# Patient Record
Sex: FEMALE | Race: WHITE | Employment: OTHER | RURAL
[De-identification: names, ages, dates, MRNs, and addresses within clinical notes are randomized per-mention and may not be internally consistent; named-entity substitution may affect disease eponyms.]

---

## 2017-02-02 ENCOUNTER — NURSE ONLY (OUTPATIENT)
Dept: FAMILY MEDICINE CLINIC | Age: 72
End: 2017-02-02
Payer: MEDICARE

## 2017-02-02 DIAGNOSIS — N28.9 ABNORMAL RENAL FUNCTION: Primary | ICD-10-CM

## 2017-02-02 DIAGNOSIS — E55.9 VITAMIN D DEFICIENCY: ICD-10-CM

## 2017-02-02 LAB
ANION GAP SERPL CALCULATED.3IONS-SCNC: 16 MMOL/L (ref 7–19)
BUN BLDV-MCNC: 26 MG/DL (ref 8–23)
CALCIUM SERPL-MCNC: 9.4 MG/DL (ref 8.8–10.2)
CHLORIDE BLD-SCNC: 100 MMOL/L (ref 98–111)
CO2: 25 MMOL/L (ref 22–29)
CREAT SERPL-MCNC: 1 MG/DL (ref 0.5–0.9)
GFR NON-AFRICAN AMERICAN: 54
GLUCOSE BLD-MCNC: 90 MG/DL (ref 74–109)
POTASSIUM SERPL-SCNC: 5 MMOL/L (ref 3.5–5)
SODIUM BLD-SCNC: 141 MMOL/L (ref 136–145)
VITAMIN D 25-HYDROXY: 39.8 NG/ML

## 2017-02-02 PROCEDURE — 36415 COLL VENOUS BLD VENIPUNCTURE: CPT | Performed by: NURSE PRACTITIONER

## 2017-02-20 ENCOUNTER — TELEPHONE (OUTPATIENT)
Dept: FAMILY MEDICINE CLINIC | Age: 72
End: 2017-02-20

## 2017-02-20 RX ORDER — GUAIFENESIN 600 MG/1
600 TABLET, EXTENDED RELEASE ORAL 2 TIMES DAILY
Qty: 60 TABLET | Refills: 1 | Status: SHIPPED | OUTPATIENT
Start: 2017-02-20 | End: 2019-04-29

## 2017-02-20 RX ORDER — METHYLPREDNISOLONE 4 MG/1
TABLET ORAL
Qty: 1 KIT | Refills: 0 | Status: SHIPPED | OUTPATIENT
Start: 2017-02-20 | End: 2017-02-26

## 2017-03-13 RX ORDER — PRAVASTATIN SODIUM 20 MG
TABLET ORAL
Qty: 30 TABLET | Refills: 5 | Status: SHIPPED | OUTPATIENT
Start: 2017-03-13 | End: 2017-09-08 | Stop reason: SDUPTHER

## 2017-03-13 RX ORDER — INDAPAMIDE 2.5 MG/1
TABLET, FILM COATED ORAL
Qty: 30 TABLET | Refills: 5 | Status: SHIPPED | OUTPATIENT
Start: 2017-03-13 | End: 2017-09-08 | Stop reason: SDUPTHER

## 2017-03-13 RX ORDER — PROPRANOLOL HYDROCHLORIDE 120 MG/1
CAPSULE, EXTENDED RELEASE ORAL
Qty: 30 CAPSULE | Refills: 5 | Status: SHIPPED | OUTPATIENT
Start: 2017-03-13 | End: 2017-09-08 | Stop reason: SDUPTHER

## 2017-03-13 RX ORDER — CANDESARTAN 32 MG/1
TABLET ORAL
Qty: 30 TABLET | Refills: 5 | Status: SHIPPED | OUTPATIENT
Start: 2017-03-13 | End: 2017-09-08 | Stop reason: SDUPTHER

## 2017-03-13 RX ORDER — ESOMEPRAZOLE MAGNESIUM 40 MG/1
CAPSULE, DELAYED RELEASE ORAL
Qty: 30 CAPSULE | Refills: 5 | Status: SHIPPED | OUTPATIENT
Start: 2017-03-13 | End: 2017-09-08 | Stop reason: SDUPTHER

## 2017-07-06 ENCOUNTER — NURSE ONLY (OUTPATIENT)
Dept: FAMILY MEDICINE CLINIC | Age: 72
End: 2017-07-06

## 2017-07-07 ENCOUNTER — TELEPHONE (OUTPATIENT)
Dept: FAMILY MEDICINE CLINIC | Age: 72
End: 2017-07-07

## 2017-07-24 ENCOUNTER — OFFICE VISIT (OUTPATIENT)
Dept: FAMILY MEDICINE CLINIC | Age: 72
End: 2017-07-24
Payer: MEDICARE

## 2017-07-24 VITALS
DIASTOLIC BLOOD PRESSURE: 70 MMHG | SYSTOLIC BLOOD PRESSURE: 110 MMHG | WEIGHT: 210 LBS | BODY MASS INDEX: 35.85 KG/M2 | HEIGHT: 64 IN

## 2017-07-24 DIAGNOSIS — M25.511 CHRONIC RIGHT SHOULDER PAIN: ICD-10-CM

## 2017-07-24 DIAGNOSIS — I10 ESSENTIAL HYPERTENSION: Primary | ICD-10-CM

## 2017-07-24 DIAGNOSIS — K52.9 CHRONIC DIARRHEA: ICD-10-CM

## 2017-07-24 DIAGNOSIS — G89.29 CHRONIC RIGHT SHOULDER PAIN: ICD-10-CM

## 2017-07-24 DIAGNOSIS — E03.9 HYPOTHYROIDISM, UNSPECIFIED TYPE: ICD-10-CM

## 2017-07-24 PROCEDURE — 1090F PRES/ABSN URINE INCON ASSESS: CPT | Performed by: NURSE PRACTITIONER

## 2017-07-24 PROCEDURE — 1123F ACP DISCUSS/DSCN MKR DOCD: CPT | Performed by: NURSE PRACTITIONER

## 2017-07-24 PROCEDURE — 1036F TOBACCO NON-USER: CPT | Performed by: NURSE PRACTITIONER

## 2017-07-24 PROCEDURE — 3017F COLORECTAL CA SCREEN DOC REV: CPT | Performed by: NURSE PRACTITIONER

## 2017-07-24 PROCEDURE — 99214 OFFICE O/P EST MOD 30 MIN: CPT | Performed by: NURSE PRACTITIONER

## 2017-07-24 PROCEDURE — G8399 PT W/DXA RESULTS DOCUMENT: HCPCS | Performed by: NURSE PRACTITIONER

## 2017-07-24 PROCEDURE — G8419 CALC BMI OUT NRM PARAM NOF/U: HCPCS | Performed by: NURSE PRACTITIONER

## 2017-07-24 PROCEDURE — G8427 DOCREV CUR MEDS BY ELIG CLIN: HCPCS | Performed by: NURSE PRACTITIONER

## 2017-07-24 PROCEDURE — 3014F SCREEN MAMMO DOC REV: CPT | Performed by: NURSE PRACTITIONER

## 2017-07-24 PROCEDURE — 4040F PNEUMOC VAC/ADMIN/RCVD: CPT | Performed by: NURSE PRACTITIONER

## 2017-07-24 RX ORDER — DICYCLOMINE HYDROCHLORIDE 10 MG/1
10 CAPSULE ORAL 4 TIMES DAILY PRN
Qty: 120 CAPSULE | Refills: 3 | Status: SHIPPED | OUTPATIENT
Start: 2017-07-24 | End: 2018-09-24 | Stop reason: SDUPTHER

## 2017-07-24 RX ORDER — HYDROCODONE BITARTRATE AND ACETAMINOPHEN 7.5; 325 MG/1; MG/1
1 TABLET ORAL EVERY 8 HOURS PRN
Qty: 45 TABLET | Refills: 0 | Status: SHIPPED | OUTPATIENT
Start: 2017-07-24 | End: 2018-07-16 | Stop reason: ALTCHOICE

## 2017-07-24 RX ORDER — DIPHENOXYLATE HYDROCHLORIDE AND ATROPINE SULFATE 2.5; .025 MG/1; MG/1
1 TABLET ORAL 4 TIMES DAILY PRN
Qty: 30 TABLET | Refills: 2 | Status: SHIPPED | OUTPATIENT
Start: 2017-07-24 | End: 2017-10-10 | Stop reason: SDUPTHER

## 2017-07-24 ASSESSMENT — ENCOUNTER SYMPTOMS
HEMOPTYSIS: 0
VOMITING: 0
NAUSEA: 0
ABDOMINAL PAIN: 0
COUGH: 0
HEARTBURN: 0
RESPIRATORY NEGATIVE: 1
BACK PAIN: 0
ORTHOPNEA: 0
SPUTUM PRODUCTION: 0
SHORTNESS OF BREATH: 0
DIARRHEA: 1
EYES NEGATIVE: 1

## 2017-07-24 ASSESSMENT — PATIENT HEALTH QUESTIONNAIRE - PHQ9
1. LITTLE INTEREST OR PLEASURE IN DOING THINGS: 0
SUM OF ALL RESPONSES TO PHQ QUESTIONS 1-9: 0
2. FEELING DOWN, DEPRESSED OR HOPELESS: 0
SUM OF ALL RESPONSES TO PHQ9 QUESTIONS 1 & 2: 0

## 2017-09-08 RX ORDER — ESOMEPRAZOLE MAGNESIUM 40 MG/1
CAPSULE, DELAYED RELEASE ORAL
Qty: 30 CAPSULE | Refills: 5 | Status: SHIPPED | OUTPATIENT
Start: 2017-09-08 | End: 2017-09-11

## 2017-09-08 RX ORDER — CANDESARTAN 32 MG/1
TABLET ORAL
Qty: 30 TABLET | Refills: 5 | Status: SHIPPED | OUTPATIENT
Start: 2017-09-08 | End: 2017-09-11

## 2017-09-08 RX ORDER — PROPRANOLOL HYDROCHLORIDE 120 MG/1
CAPSULE, EXTENDED RELEASE ORAL
Qty: 30 CAPSULE | Refills: 5 | Status: SHIPPED | OUTPATIENT
Start: 2017-09-08 | End: 2017-09-11

## 2017-09-08 RX ORDER — INDAPAMIDE 2.5 MG/1
TABLET, FILM COATED ORAL
Qty: 30 TABLET | Refills: 5 | Status: SHIPPED | OUTPATIENT
Start: 2017-09-08 | End: 2017-09-11

## 2017-09-08 RX ORDER — PRAVASTATIN SODIUM 20 MG
TABLET ORAL
Qty: 30 TABLET | Refills: 5 | Status: SHIPPED | OUTPATIENT
Start: 2017-09-08 | End: 2017-09-11

## 2017-09-11 RX ORDER — INDAPAMIDE 2.5 MG/1
TABLET, FILM COATED ORAL
Qty: 30 TABLET | Refills: 5 | Status: SHIPPED | OUTPATIENT
Start: 2017-09-11 | End: 2018-10-16 | Stop reason: SDUPTHER

## 2017-09-11 RX ORDER — ESOMEPRAZOLE MAGNESIUM 40 MG/1
CAPSULE, DELAYED RELEASE ORAL
Qty: 30 CAPSULE | Refills: 5 | Status: SHIPPED | OUTPATIENT
Start: 2017-09-11 | End: 2018-10-16 | Stop reason: SDUPTHER

## 2017-09-11 RX ORDER — PRAVASTATIN SODIUM 20 MG
TABLET ORAL
Qty: 30 TABLET | Refills: 5 | Status: SHIPPED | OUTPATIENT
Start: 2017-09-11 | End: 2018-10-04

## 2017-09-11 RX ORDER — PROPRANOLOL HYDROCHLORIDE 120 MG/1
CAPSULE, EXTENDED RELEASE ORAL
Qty: 30 CAPSULE | Refills: 5 | Status: SHIPPED | OUTPATIENT
Start: 2017-09-11 | End: 2018-10-16 | Stop reason: SDUPTHER

## 2017-09-11 RX ORDER — CANDESARTAN 32 MG/1
TABLET ORAL
Qty: 30 TABLET | Refills: 5 | Status: SHIPPED | OUTPATIENT
Start: 2017-09-11 | End: 2018-10-16 | Stop reason: SDUPTHER

## 2017-09-28 ENCOUNTER — TELEPHONE (OUTPATIENT)
Dept: FAMILY MEDICINE CLINIC | Age: 72
End: 2017-09-28

## 2017-10-10 RX ORDER — DIPHENOXYLATE HYDROCHLORIDE AND ATROPINE SULFATE 2.5; .025 MG/1; MG/1
1 TABLET ORAL 4 TIMES DAILY PRN
Qty: 30 TABLET | Refills: 2 | Status: SHIPPED | OUTPATIENT
Start: 2017-10-10 | End: 2019-02-06 | Stop reason: SDUPTHER

## 2018-07-03 DIAGNOSIS — K21.9 GASTROESOPHAGEAL REFLUX DISEASE, ESOPHAGITIS PRESENCE NOT SPECIFIED: ICD-10-CM

## 2018-07-03 DIAGNOSIS — M19.90 OSTEOARTHRITIS, UNSPECIFIED OSTEOARTHRITIS TYPE, UNSPECIFIED SITE: ICD-10-CM

## 2018-07-03 DIAGNOSIS — F41.0 PANIC ATTACK: ICD-10-CM

## 2018-07-03 DIAGNOSIS — E78.00 HYPERCHOLESTEROLEMIA: ICD-10-CM

## 2018-07-03 DIAGNOSIS — E78.1 HYPERTRIGLYCERIDEMIA: ICD-10-CM

## 2018-07-03 RX ORDER — ACETAMINOPHEN 160 MG
TABLET,DISINTEGRATING ORAL
COMMUNITY
End: 2022-05-12

## 2018-07-03 RX ORDER — FENOFIBRATE 160 MG/1
160 TABLET ORAL DAILY
COMMUNITY
End: 2018-07-16 | Stop reason: ALTCHOICE

## 2018-07-03 RX ORDER — LOSARTAN POTASSIUM 100 MG/1
100 TABLET ORAL DAILY
COMMUNITY
End: 2018-07-16 | Stop reason: ALTCHOICE

## 2018-07-15 PROBLEM — Z98.890 S/P CARDIAC CATH: Status: ACTIVE | Noted: 2018-07-15

## 2018-07-16 ENCOUNTER — OFFICE VISIT (OUTPATIENT)
Dept: CARDIOLOGY | Age: 73
End: 2018-07-16
Payer: MEDICARE

## 2018-07-16 VITALS
SYSTOLIC BLOOD PRESSURE: 140 MMHG | WEIGHT: 212 LBS | HEART RATE: 55 BPM | BODY MASS INDEX: 36.19 KG/M2 | DIASTOLIC BLOOD PRESSURE: 70 MMHG | HEIGHT: 64 IN

## 2018-07-16 DIAGNOSIS — I10 SYSTEMIC PRIMARY ARTERIAL HYPERTENSION: Primary | ICD-10-CM

## 2018-07-16 DIAGNOSIS — E04.1 THYROID NODULE: ICD-10-CM

## 2018-07-16 DIAGNOSIS — Z98.890 S/P CARDIAC CATH: ICD-10-CM

## 2018-07-16 DIAGNOSIS — E66.09 CLASS 2 OBESITY DUE TO EXCESS CALORIES WITHOUT SERIOUS COMORBIDITY WITH BODY MASS INDEX (BMI) OF 36.0 TO 36.9 IN ADULT: ICD-10-CM

## 2018-07-16 DIAGNOSIS — E78.00 HYPERCHOLESTEROLEMIA: ICD-10-CM

## 2018-07-16 PROBLEM — E66.812 CLASS 2 OBESITY DUE TO EXCESS CALORIES WITHOUT SERIOUS COMORBIDITY IN ADULT: Status: ACTIVE | Noted: 2018-07-16

## 2018-07-16 PROCEDURE — 3017F COLORECTAL CA SCREEN DOC REV: CPT | Performed by: INTERNAL MEDICINE

## 2018-07-16 PROCEDURE — G8427 DOCREV CUR MEDS BY ELIG CLIN: HCPCS | Performed by: INTERNAL MEDICINE

## 2018-07-16 PROCEDURE — 1123F ACP DISCUSS/DSCN MKR DOCD: CPT | Performed by: INTERNAL MEDICINE

## 2018-07-16 PROCEDURE — 4040F PNEUMOC VAC/ADMIN/RCVD: CPT | Performed by: INTERNAL MEDICINE

## 2018-07-16 PROCEDURE — 1101F PT FALLS ASSESS-DOCD LE1/YR: CPT | Performed by: INTERNAL MEDICINE

## 2018-07-16 PROCEDURE — 1036F TOBACCO NON-USER: CPT | Performed by: INTERNAL MEDICINE

## 2018-07-16 PROCEDURE — G8417 CALC BMI ABV UP PARAM F/U: HCPCS | Performed by: INTERNAL MEDICINE

## 2018-07-16 PROCEDURE — 1090F PRES/ABSN URINE INCON ASSESS: CPT | Performed by: INTERNAL MEDICINE

## 2018-07-16 PROCEDURE — 93000 ELECTROCARDIOGRAM COMPLETE: CPT | Performed by: INTERNAL MEDICINE

## 2018-07-16 PROCEDURE — G8399 PT W/DXA RESULTS DOCUMENT: HCPCS | Performed by: INTERNAL MEDICINE

## 2018-07-16 PROCEDURE — 99202 OFFICE O/P NEW SF 15 MIN: CPT | Performed by: INTERNAL MEDICINE

## 2018-07-16 ASSESSMENT — ENCOUNTER SYMPTOMS
BLOOD IN STOOL: 0
STRIDOR: 0
WHEEZING: 0
SHORTNESS OF BREATH: 0
CHEST TIGHTNESS: 0
BACK PAIN: 0
APNEA: 0
CHOKING: 0
NAUSEA: 0
ABDOMINAL DISTENTION: 0

## 2018-07-16 NOTE — PROGRESS NOTES
normally in 120s at home and at primary care's office  ++ FH of CAD (father and two siblings at early age)     Gastrointestinal: Negative for abdominal distention, blood in stool and nausea. GERD   Bout with gastritis earlier this year   Endocrine: Negative for cold intolerance, heat intolerance, polydipsia and polyuria. Combined hyperlipidemia - recent values reportedly good  R lobe thyroid nodule presently under investigation  Hypothyroid - on replacement therapy   Genitourinary: Negative for decreased urine volume and difficulty urinating. Musculoskeletal: Negative for arthralgias, back pain, gait problem and myalgias. Broke left foot this spring without recognized trauma   Skin: Negative for pallor and rash. Allergic/Immunologic: Negative for environmental allergies, food allergies and immunocompromised state. Neurological: Negative for dizziness, tremors, seizures, syncope, speech difficulty, weakness, light-headedness, numbness and headaches. Psychiatric/Behavioral: Negative for agitation, confusion, dysphoric mood and sleep disturbance. The patient is nervous/anxious (concerned about thyroid nodule). BP (!) 140/70   Pulse 55   Ht 5' 4\" (1.626 m)   Wt 212 lb (96.2 kg)   BMI 36.39 kg/m²   Physical Exam   Constitutional: She is oriented to person, place, and time. No distress. Moderately obese   HENT:   Head: Normocephalic. Alopecia   Eyes: Conjunctivae and EOM are normal. Pupils are equal, round, and reactive to light. Neck: No JVD present. Carotid bruit is not present. No thyromegaly present. Cardiovascular: Normal rate, regular rhythm, normal heart sounds, intact distal pulses and normal pulses. PMI is not displaced. Exam reveals no gallop and no friction rub. No murmur heard. Pulmonary/Chest: No respiratory distress. She has no wheezes. She has no rales. She exhibits no tenderness. Brief inspiratory coarse breath sound   Abdominal: Soft.  Bowel sounds

## 2018-07-16 NOTE — LETTER
Dear Keyanna August, APRN - CNP,    Thank you for allowing me to participate in the care of Ms. Alexis Schneider. She presents today at the [address] [place] [address] in the [facility]. 66 y/o lady with +FH of premature CAD, hypertension and combined hyperlipidemia. Normal cath in 2014. Sedentary for the past few months in the wake of fracturing her left foot. No ischemic symptoms. BP normally in the 120s and lipids reportedly good 6 weeks ago. Has right thyoid lobe nodule under investigation - anxious about it. Patient Active Problem List   Diagnosis    Painful intercourse    Systemic primary arterial hypertension    Combined hyperlipidemia    Chronic diarrhea    History of colon resection    Osteoarthritis    Hypercholesterolemia    Hypertriglyceridemia    GERD (gastroesophageal reflux disease)    Panic attack    S/P cardiac cath    Thyroid nodule    Class 2 obesity due to excess calories without serious comorbidity in adult         Advised her that her BP target was less than 130. If continues to run > 130 suggest consider sleep study and perhaps Clonidine 0.1 mg. qHs.       Sincerely yours,    Colleen Dorsey MD  [address] [facility]

## 2018-08-01 ENCOUNTER — OFFICE VISIT (OUTPATIENT)
Dept: PRIMARY CARE CLINIC | Age: 73
End: 2018-08-01
Payer: MEDICARE

## 2018-08-01 VITALS
RESPIRATION RATE: 20 BRPM | WEIGHT: 211 LBS | TEMPERATURE: 97.6 F | HEART RATE: 65 BPM | BODY MASS INDEX: 37.39 KG/M2 | DIASTOLIC BLOOD PRESSURE: 84 MMHG | SYSTOLIC BLOOD PRESSURE: 138 MMHG | HEIGHT: 63 IN | OXYGEN SATURATION: 97 %

## 2018-08-01 DIAGNOSIS — E78.1 HYPERTRIGLYCERIDEMIA: ICD-10-CM

## 2018-08-01 DIAGNOSIS — I10 ESSENTIAL HYPERTENSION: ICD-10-CM

## 2018-08-01 DIAGNOSIS — E78.00 HYPERCHOLESTEROLEMIA: ICD-10-CM

## 2018-08-01 DIAGNOSIS — M54.2 NECK PAIN: Primary | ICD-10-CM

## 2018-08-01 LAB
ALBUMIN SERPL-MCNC: 4.3 G/DL (ref 3.5–5.2)
ALP BLD-CCNC: 79 U/L (ref 35–104)
ALT SERPL-CCNC: 8 U/L (ref 5–33)
ANION GAP SERPL CALCULATED.3IONS-SCNC: 17 MMOL/L (ref 7–19)
AST SERPL-CCNC: 22 U/L (ref 5–32)
BASOPHILS ABSOLUTE: 0.1 K/UL (ref 0–0.2)
BASOPHILS RELATIVE PERCENT: 0.7 % (ref 0–1)
BILIRUB SERPL-MCNC: 0.3 MG/DL (ref 0.2–1.2)
BUN BLDV-MCNC: 23 MG/DL (ref 8–23)
CALCIUM SERPL-MCNC: 10 MG/DL (ref 8.8–10.2)
CHLORIDE BLD-SCNC: 96 MMOL/L (ref 98–111)
CHOLESTEROL, TOTAL: 157 MG/DL (ref 160–199)
CO2: 23 MMOL/L (ref 22–29)
CREAT SERPL-MCNC: 1.1 MG/DL (ref 0.5–0.9)
EOSINOPHILS ABSOLUTE: 0.2 K/UL (ref 0–0.6)
EOSINOPHILS RELATIVE PERCENT: 1.8 % (ref 0–5)
GFR NON-AFRICAN AMERICAN: 49
GLUCOSE BLD-MCNC: 91 MG/DL (ref 74–109)
HCT VFR BLD CALC: 43.1 % (ref 37–47)
HDLC SERPL-MCNC: 57 MG/DL (ref 65–121)
HEMOGLOBIN: 13.2 G/DL (ref 12–16)
LDL CHOLESTEROL CALCULATED: 55 MG/DL
LYMPHOCYTES ABSOLUTE: 1.5 K/UL (ref 1.1–4.5)
LYMPHOCYTES RELATIVE PERCENT: 16.4 % (ref 20–40)
MCH RBC QN AUTO: 30.4 PG (ref 27–31)
MCHC RBC AUTO-ENTMCNC: 30.6 G/DL (ref 33–37)
MCV RBC AUTO: 99.3 FL (ref 81–99)
MONOCYTES ABSOLUTE: 0.7 K/UL (ref 0–0.9)
MONOCYTES RELATIVE PERCENT: 8.2 % (ref 0–10)
NEUTROPHILS ABSOLUTE: 6.4 K/UL (ref 1.5–7.5)
NEUTROPHILS RELATIVE PERCENT: 70.8 % (ref 50–65)
PDW BLD-RTO: 13.2 % (ref 11.5–14.5)
PLATELET # BLD: 314 K/UL (ref 130–400)
PMV BLD AUTO: 10.6 FL (ref 9.4–12.3)
POTASSIUM SERPL-SCNC: 5.8 MMOL/L (ref 3.5–5)
RBC # BLD: 4.34 M/UL (ref 4.2–5.4)
SODIUM BLD-SCNC: 136 MMOL/L (ref 136–145)
TOTAL PROTEIN: 7.5 G/DL (ref 6.6–8.7)
TRIGL SERPL-MCNC: 224 MG/DL (ref 0–149)
WBC # BLD: 9 K/UL (ref 4.8–10.8)

## 2018-08-01 PROCEDURE — 4040F PNEUMOC VAC/ADMIN/RCVD: CPT | Performed by: FAMILY MEDICINE

## 2018-08-01 PROCEDURE — 1101F PT FALLS ASSESS-DOCD LE1/YR: CPT | Performed by: FAMILY MEDICINE

## 2018-08-01 PROCEDURE — 99214 OFFICE O/P EST MOD 30 MIN: CPT | Performed by: FAMILY MEDICINE

## 2018-08-01 PROCEDURE — G8399 PT W/DXA RESULTS DOCUMENT: HCPCS | Performed by: FAMILY MEDICINE

## 2018-08-01 PROCEDURE — 3017F COLORECTAL CA SCREEN DOC REV: CPT | Performed by: FAMILY MEDICINE

## 2018-08-01 PROCEDURE — 1090F PRES/ABSN URINE INCON ASSESS: CPT | Performed by: FAMILY MEDICINE

## 2018-08-01 PROCEDURE — 1036F TOBACCO NON-USER: CPT | Performed by: FAMILY MEDICINE

## 2018-08-01 PROCEDURE — 1123F ACP DISCUSS/DSCN MKR DOCD: CPT | Performed by: FAMILY MEDICINE

## 2018-08-01 PROCEDURE — G8427 DOCREV CUR MEDS BY ELIG CLIN: HCPCS | Performed by: FAMILY MEDICINE

## 2018-08-01 PROCEDURE — G8417 CALC BMI ABV UP PARAM F/U: HCPCS | Performed by: FAMILY MEDICINE

## 2018-08-01 PROCEDURE — 36415 COLL VENOUS BLD VENIPUNCTURE: CPT | Performed by: FAMILY MEDICINE

## 2018-08-01 RX ORDER — CELECOXIB 200 MG/1
200 CAPSULE ORAL DAILY
Qty: 60 CAPSULE | Refills: 3 | Status: SHIPPED | OUTPATIENT
Start: 2018-08-01 | End: 2018-08-23

## 2018-08-01 ASSESSMENT — ENCOUNTER SYMPTOMS
VOMITING: 0
DIARRHEA: 0
COUGH: 0
COLOR CHANGE: 0
EYE DISCHARGE: 0
WHEEZING: 0
NAUSEA: 0
BACK PAIN: 0
ABDOMINAL PAIN: 0

## 2018-08-01 ASSESSMENT — PATIENT HEALTH QUESTIONNAIRE - PHQ9
SUM OF ALL RESPONSES TO PHQ QUESTIONS 1-9: 0
SUM OF ALL RESPONSES TO PHQ9 QUESTIONS 1 & 2: 0
1. LITTLE INTEREST OR PLEASURE IN DOING THINGS: 0
2. FEELING DOWN, DEPRESSED OR HOPELESS: 0

## 2018-08-01 NOTE — PROGRESS NOTES
Pt scheduled at [address] [place] [address] at [facility] in [place] on 08/07/18 at 11 am

## 2018-08-01 NOTE — PROGRESS NOTES
SUBJECTIVE:    Patient ID: Deepa Garcia is a 67 y.o. female. HPI:     Patient presents today to establish care. She has been seeing [facility] in [place]. She is here today over a couple of concerns. She states that she had a thyroid ultrasound done and it showed a 2.6 cm cyst on the right lobe. She states that she also had some smaller nodules on the left lobe. She states that she was subsequently seen by an endocrinologist at [place]. She states that she is on thyroid medication. She states that they are not going to do anything with the nodules at this time other than monitor them. She has a follow-up appointment with endocrinologist in 6 months. She states that she is concerned because she can feel the enlargement in her thyroid gland. She states that she also has a history of high blood pressure. She is on blood pressure medication and it tends to stay fairly well controlled with the medications that she is taking. She states that she is not routinely monitoring her blood pressure. She does complain today of some arthritis. She has joint swelling and arthritis in her hands as well as some pain in her neck. She states that it tends to flareup and has been worse over the last couple of months. She states that she has pain at the base of her skull and some tightness. She states that she's been trying to take some ibuprofen but when she takes this it upsets her stomach. She states that she is able to tell a difference with ibuprofen but she cannot tolerate it due to her stomach.     Past Medical History:   Diagnosis Date    Carpal tunnel syndrome, right     HIGH CHOLESTEROL     Hypertension     Hypothyroidism      Current Outpatient Prescriptions on File Prior to Visit   Medication Sig Dispense Refill    Cholecalciferol (VITAMIN D3) 2000 units CAPS Take by mouth      diphenoxylate-atropine (DIPHENATOL) 2.5-0.025 MG per tablet Take 1 tablet by mouth 4 times daily as needed for Diarrhea 30 tablet 2    levothyroxine (SYNTHROID) 125 MCG tablet TAKE ONE TABLET BY MOUTH EVERY DAY (Patient taking differently: TAKE ONE TABLET BY MOUTH EVERY DAY  Name Brand Only) 30 tablet 0    indapamide (LOZOL) 2.5 MG tablet TAKE ONE TABLET BY MOUTH EVERY MORNING 30 tablet 5    propranolol (INDERAL LA) 120 MG extended release capsule TAKE ONE CAPSULE BY MOUTH EVERY DAY 30 capsule 5    pravastatin (PRAVACHOL) 20 MG tablet TAKE ONE TABLET BY MOUTH AT BEDTIME 30 tablet 5    candesartan (ATACAND) 32 MG tablet TAKE ONE TABLET BY MOUTH EVERY DAY 30 tablet 5    esomeprazole (NEXIUM) 40 MG delayed release capsule TAKE ONE CAPSULE BY MOUTH EVERY MORNING BEFORE BREAKFAST 30 capsule 5    dicyclomine (BENTYL) 10 MG capsule Take 1 capsule by mouth 4 times daily as needed (irrirable bowel symptoms) 120 capsule 3    guaiFENesin (MUCINEX) 600 MG extended release tablet Take 1 tablet by mouth 2 times daily (Patient taking differently: Take 600 mg by mouth 2 times daily as needed ) 60 tablet 1    aspirin 81 MG tablet Take 81 mg by mouth daily       No current facility-administered medications on file prior to visit.       Allergies   Allergen Reactions    Codeine      Past Surgical History:   Procedure Laterality Date    BREAST BIOPSY Right     BREAST LUMPECTOMY  1980s    2 times    CARDIAC CATHETERIZATION  2007    CARDIAC CATHETERIZATION  2014    CHOLECYSTECTOMY  2012    COLECTOMY      2012    COLONOSCOPY  2015    normal, due 10 years    HAND SURGERY      reconstruction, right hand    HYSTERECTOMY      total    TUBAL LIGATION      UPPER GASTROINTESTINAL ENDOSCOPY  2014    normal     Family History   Problem Relation Age of Onset    Heart Disease Mother     Heart Disease Father     Heart Disease Sister     Heart Disease Brother     Heart Disease Brother      Social History     Social History    Marital status:      Spouse name: N/A    Number of children: N/A    Years of education: Wt 211 lb (95.7 kg)   SpO2 97%   BMI 37.38 kg/m²      ASSESSMENT:    Yulissa Miranda was seen today for establish care, thyroid problem, hypertension and hyperlipidemia. Diagnoses and all orders for this visit:    Neck pain  -     External Referral To Physical Therapy  -     CBC Auto Differential  -     Comprehensive Metabolic Panel  -     Lipid Panel    Hypertriglyceridemia  -     CBC Auto Differential  -     Comprehensive Metabolic Panel  -     Lipid Panel    Hypercholesterolemia  -     CBC Auto Differential  -     Comprehensive Metabolic Panel  -     Lipid Panel    Essential hypertension  -     CBC Auto Differential  -     Comprehensive Metabolic Panel  -     Lipid Panel    Other orders  -     celecoxib (CELEBREX) 200 MG capsule; Take 1 capsule by mouth daily        PLAN:    We will start Celebrex for her arthritis and neck pain. I'm going to check labs as noted above. We'll notify her of the results. Encouraged her to continue to follow-up with endocrinology at [facility] regarding her thyroid. Follow-up with us in 6 months for checkup unless needed sooner. EMR Dragon/transcription disclaimer:  Much of this encounter note is electronic transcription/translation of spoken language to printed texts. The electronic translation of spoken language may be erroneous, or at times, nonsensical words or phrases may be inadvertently transcribed.   Although I have reviewed the note for such errors, some may still exist.

## 2018-08-07 ENCOUNTER — TRANSCRIBE ORDERS (OUTPATIENT)
Dept: PHYSICAL THERAPY | Facility: HOSPITAL | Age: 73
End: 2018-08-07

## 2018-08-07 ENCOUNTER — HOSPITAL ENCOUNTER (OUTPATIENT)
Dept: PHYSICAL THERAPY | Facility: HOSPITAL | Age: 73
Setting detail: THERAPIES SERIES
Discharge: HOME | End: 2018-08-07

## 2018-08-07 DIAGNOSIS — M54.2 NECK PAIN: Primary | ICD-10-CM

## 2018-08-07 PROCEDURE — G8981 BODY POS CURRENT STATUS: HCPCS | Performed by: PHYSICAL THERAPIST

## 2018-08-07 PROCEDURE — 97161 PT EVAL LOW COMPLEX 20 MIN: CPT | Performed by: PHYSICAL THERAPIST

## 2018-08-07 PROCEDURE — 97110 THERAPEUTIC EXERCISES: CPT | Performed by: PHYSICAL THERAPIST

## 2018-08-07 PROCEDURE — G8982 BODY POS GOAL STATUS: HCPCS | Performed by: PHYSICAL THERAPIST

## 2018-08-07 NOTE — THERAPY EVALUATION
Outpatient Physical Therapy Ortho Initial Evaluation  [facility]     Patient Name: Vanessa Kamara  : 1945  MRN: [id number]  Today's Date: 2018      Visit Date: 2018     Subjective Improvement:   N/A    Attendance:   Approved:   Medicare guidelines MD follow up:   PRN       RC date:  18         There is no problem list on file for this patient.       Past Medical History:   Diagnosis Date   • Disease of thyroid gland    • GERD (gastroesophageal reflux disease)    • Hiatal hernia         Past Surgical History:   Procedure Laterality Date   • BREAST BIOPSY      Benign breast tumors   • CHOLECYSTECTOMY     • COLON RESECTION     • HAND SURGERY Right    • HYSTERECTOMY       Allergies   Allergen Reactions   • Codeine Unknown (See Comments)     Unknown     Medications:  - Candesartan  - Nexium  - Pravastatin  - Propranolol  - Synthroid  - Bentyl  - Vitamin D  - Low dose aspirin  - Indapamide      Visit Dx:     ICD-10-CM ICD-9-CM   1. Neck pain M54.2 723.1             Patient History     Row Name 18 1100             History    Chief Complaint Pain;Joint stiffness  -KG      Type of Pain Neck pain  -KG      Date Current Problem(s) Began --   6 months ago  -KG      Brief Description of Current Complaint Pt presents with c/o neck pain. States it stays localized to base of neck and mid neck. Reports no pain in BUE's or numbness & tingling. Pt states she cannot take a lot of anti-inflammatories due to some kidney issues. States the pain in her neck started on the R side but is on both sides. States stress makes it worse. Mostly feels stiff. Painful turning head L or R, seems worse on R. Pt states she has a R rotator cuff, not a good candidate for shoulder replacement. Doesn't have much pain with it unless trying to lift anything.  -KG      Patient/Caregiver Goals Relieve pain;Improve mobility  -KG      Hand Dominance right-handed  -KG      Occupation/sports/leisure  activities Pt is a retired ; pt enjoys sewing, knitting, quilting  -KG      Results of Clinical Tests No recent imaging completed   -KG         Pain     Pain Location Neck  -KG      Pain at Present 0  -KG      Pain at Best 4  -KG      Pain at Worst 8  -KG      Pain Frequency Intermittent  -KG      Pain Description Aching;Patient unable to describe  -KG      What Performance Factors Make the Current Problem(s) WORSE? Looking down for long periods of time, turning head L or R  -KG      What Performance Factors Make the Current Problem(s) BETTER? Using heating pad helps some  -KG      Pain Comments Painful with movement, prolonged positioning.  -KG      Is your sleep disturbed? Yes   Pt has trouble getting to sleep vs. sleeping  -KG      Difficulties at work? N/A  -KG      Difficulties with ADL's? Independent  -KG      Difficulties with recreational activities? Some increased pain looking down to sew  -KG         Fall Risk Assessment    Any falls in the past year: No  -KG        User Key  (r) = Recorded By, (t) = Taken By, (c) = Cosigned By    Initials Name Provider Type    KG Tran Vega, PT Physical Therapist                PT Ortho     Row Name 08/07/18 1100       Subjective Comments    Subjective Comments Refer to therapy pt history for details.  -KG       Precautions and Contraindications    Precautions/Limitations no known precautions/limitations  -KG       Subjective Pain    Able to rate subjective pain? yes  -KG    Pre-Treatment Pain Level 0   at rest  -KG    Subjective Pain Comment 4/10 with activity this date  -KG       Posture/Observations    Posture/Observations Comments No acute distress. Mild cervical gaurding noted. Fair/poor postural awareness noted. Forward head, rounded shoulder posture present.   -KG       Quarter Clearing    Quarter Clearing Upper Quarter Clearing  -KG       Sensory Screen for Light Touch- Upper Quarter Clearing    C4 (posterior shoulder) Bilateral:;Intact  -KG    C5  (lateral upper arm) Bilateral:;Intact  -KG    C6 (tip of thumb) Bilateral:;Intact  -KG    C7 (tip of 3rd finger) Bilateral:;Intact  -KG    C8 (tip of 5th finger) Bilateral:;Intact  -KG    T1 (medial lower arm) Bilateral:;Intact  -KG       Myotomal Screen- Upper Quarter Clearing    Shoulder flexion (C5) Bilateral:;WNL  -KG    Elbow flexion/wrist extension (C6) Bilateral:;WNL  -KG    Elbow extension/wrist flexion (C7) Bilateral:;WNL  -KG    Finger flexion/ (C8) Bilateral:;WNL  -KG    Finger abduction (T1) Bilateral:;WNL  -KG     Bilateral:;WNL  -KG       Cervical/Shoulder ROM Screen    Cervical flexion Normal  -KG    Cervical extension Impaired  -KG    Cervical lateral flexion Impaired  -KG    Cervical rotation Impaired  -KG    Cervical quadrant (Spurling's) Normal  -KG       Special Tests/Palpation    Special Tests/Palpation Cervical/Thoracic  -KG       Cervical Palpation    Cervical Palpation- Location? Suboccipital;Cervical facets;Levator scapula;Upper traps;Scalenes  -KG    Suboccipital Bilateral:;Tender;Guarded/taut  -KG    Cervical Facets Right:;Tender;Guarded/taut  -KG    Scalenes Bilateral:;Tender;Guarded/taut  -KG    Levator Scapula Bilateral:;Tender;Guarded/taut  -KG    Upper Traps Bilateral:;Tender;Guarded/taut  -KG       Cervical Accessory Motions    Cervical Accessory Motions Tested? Yes  -KG    Sideglide- C3 Right:;Hypomobile;Right pain  -KG    Sideglide- C4 Right:;Hypomobile;Right pain  -KG    Sideglide- C5 Right:;Hypomobile;Right pain  -KG    Sideglide- C6 Right:;Hypomobile;Right pain  -KG       General ROM    Head/Neck/Trunk Neck Extension;Neck Flexion;Neck Lt Lateral Flexion;Neck Rt Lateral Flexion;Neck Lt Rotation;Neck Rt Rotation  -KG    RT Upper Ext Rt Shoulder ABduction;Rt Shoulder Flexion  -KG    LT Upper Ext Lt Shoulder ABduction;Lt Shoulder Flexion  -KG       Head/Neck/Trunk    Neck Extension AROM 35 deg; pain  -KG    Neck Flexion AROM 55 deg; pain R base of skull  -KG    Neck Lt  Lateral Flexion AROM 35 deg  -KG    Neck Rt Lateral Flexion AROM 25 deg  -KG    Neck Lt Rotation AROM 60  -KG    Neck Rt Rotation AROM 48  -KG       Right Upper Ext    Rt Shoulder Abduction AROM 80 deg  -KG    Rt Shoulder Flexion AROM 115 deg  -KG    Rt Upper Extremity Comments  No neck pain; hx of R RTC tear  -KG       Left Upper Ext    Lt Shoulder Abduction AROM 155  -KG    Lt Shoulder Flexion AROM 158  -KG    Lt Upper Extremity Comments  No pain  -KG       MMT (Manual Muscle Testing)    Additional Documentation General Assessment (Manual Muscle Testing) (Group)  -KG       General Assessment (Manual Muscle Testing)    General Manual Muscle Testing (MMT) Assessment upper extremity strength deficits identified  -KG       Upper Extremity (Manual Muscle Testing)    Upper Extremity: Manual Muscle Testing (MMT) left shoulder strength deficit;right shoulder strength deficit  -KG       Left Shoulder (Manual Muscle Testing)    Comment, MMT: Left Shoulder Flex 4+/5, abd 4+/5  -KG       Right Shoulder (Manual Muscle Testing)    Comment, MMT: Right Shoulder Deferred due to lack of shoulder ROM  -KG       Sensation    Sensation WNL? WNL  -KG    Light Touch No apparent deficits  -KG       Flexibility    Flexibility Tested? Upper Extremity  -KG       Upper Extremity Flexibility    Suboccipitals Bilateral:;Moderately limited  -KG    Upper Trapezius Bilateral:;Mildly limited  -KG    Levator Scapula Bilateral:;Mildly limited  -KG      User Key  (r) = Recorded By, (t) = Taken By, (c) = Cosigned By    Initials Name Provider Type    Tran Ruiz, PT Physical Therapist                      Therapy Education  Education Details: POC education. Anatomy related to condition. HEP: supine chin tucks, scap retraction, cervical AROM, levator stretch towards L side.  Given: HEP, Symptoms/condition management, Pain management, Posture/body mechanics  Program: New  How Provided: Verbal, Demonstration, Written  Provided to: Patient  Level  of Understanding: Teach back education performed, Verbalized, Demonstrated           PT OP Goals     Row Name 08/07/18 1100          PT Short Term Goals    STG Date to Achieve 08/28/18  -KG     STG 1 Independent/compliant with HEP  -KG     STG 1 Progress New  -KG     STG 2 Tolerate 45 minute treatment session without increased pain  -KG     STG 2 Progress New  -KG     STG 3 Demonstrate cervical ext AROM to 45 deg without increased pain  -KG     STG 3 Progress New  -KG        Long Term Goals    LTG Date to Achieve 09/11/18  -KG     LTG 1 Subjectively report 60% improvement or greater  -KG     LTG 1 Progress New  -KG     LTG 2 Improve NDI score to 10% or less  -KG     LTG 2 Progress New  -KG     LTG 3 Demonstrate R cervical rotation to 60 deg or greater without increased pain  -KG     LTG 3 Progress New  -KG     LTG 4 Demonstrate independence in proper posture/body mechanics throughout treatment session without cuing  -KG     LTG 4 Progress New  -KG     LTG 5 Demonstrate R cervical lat flex AROM within 5 deg of L lat flex without increased pain  -KG     LTG 5 Progress New  -KG     LTG 6 Demonstrate decrease pain/TTP at bilateral sub-occipitals by 75%  -KG     LTG 6 Progress New  -KG        Time Calculation    PT Goal Re-Cert Due Date 08/28/18  -KG       User Key  (r) = Recorded By, (t) = Taken By, (c) = Cosigned By    Initials Name Provider Type    ARMOND Tran Vega, PT Physical Therapist                PT Assessment/Plan     Row Name 08/07/18 1100          PT Assessment    Functional Limitations Performance in leisure activities;Limitation in home management;Limitations in community activities  -KG     Impairments Impaired flexibility;Joint mobility;Muscle strength;Pain;Posture;Poor body mechanics;Range of motion  -KG     Assessment Comments Pt is a 72 y.o. female presenting with neck pain R>L. Pt more limited with cervical ext, R rotation/SB AROM. Hypomobility present R side glides. Pt very TTP/guarded at  bilateral sub-occipitals & R UT/levator > L. Overall demonstrate poor/fair postural awareness. Pt will benefit from skilled PT services to address these deficits for improvements in overall postural strengthening, cervical stabilization/mobility, and tolerance to daily activities.  -KG     Please refer to paper survey for additional self-reported information Yes  -KG     Rehab Potential Good  -KG     Patient/caregiver participated in establishment of treatment plan and goals Yes  -KG     Patient would benefit from skilled therapy intervention Yes  -KG        PT Plan    PT Frequency 2x/week  -KG     Predicted Duration of Therapy Intervention (Therapy Eval) 4 weeks  -KG     Planned CPT's? PT EVAL LOW COMPLEXITY: 19748;PT RE-EVAL: 48826;PT THER PROC EA 15 MIN: 59152;PT THER ACT EA 15 MIN: 06311;PT MANUAL THERAPY EA 15 MIN: 42158;PT NEUROMUSC RE-EDUCATION EA 15 MIN: 03096;PT SELF CARE/HOME MGMT/TRAIN EA 15: 23047;PT ELECTRICAL STIM UNATTEND: ;PT THER SUPP EA 15 MIN  -KG     Physical Therapy Interventions (Optional Details) dry needling;home exercise program;joint mobilization;manual therapy techniques;modalities;neuromuscular re-education;patient/family education;postural re-education;ROM (Range of Motion);strengthening;stretching  -KG     PT Plan Comments Focus on postural strengthening, cervical stabilization, isometrics, manual STM/MFR to sub-occipitals/parapspinals, gentle sideglides/mobilizaitons, cervical ROM.  -KG       User Key  (r) = Recorded By, (t) = Taken By, (c) = Cosigned By    Initials Name Provider Type    KG Tran Vega, PT Physical Therapist                Modalities     Row Name 08/07/18 1100             Moist Heat    MH Applied --   Pt deferred modalities  -KG        User Key  (r) = Recorded By, (t) = Taken By, (c) = Cosigned By    Initials Name Provider Type    KG Tran Vega, PT Physical Therapist              Exercises     Row Name 08/07/18 1100             Precautions    Existing  "Precautions/Restrictions no known precautions/restrictions  -KG         Subjective Comments    Subjective Comments Refer to therapy pt history for details.  -KG         Subjective Pain    Able to rate subjective pain? yes  -KG      Pre-Treatment Pain Level 0   at rest  -KG      Post-Treatment Pain Level 0  -KG      Subjective Pain Comment 4/10 with activity this date  -KG         Aquatics    Aquatics performed? No  -KG         Exercise 1    Exercise Name 1 Supine chin tucks  -KG      Cueing 1 Verbal  -KG      Sets 1 1  -KG      Reps 1 15  -KG      Time 1 3\" hold  -KG         Exercise 2    Exercise Name 2 Levator stretch towards L side only  -KG      Cueing 2 Verbal  -KG      Reps 2 2  -KG      Time 2 30\" hold  -KG         Exercise 3    Exercise Name 3 Scap retraction  -KG      Cueing 3 Verbal  -KG      Sets 3 1  -KG      Reps 3 15  -KG      Time 3 5\" hold  -KG         Exercise 4    Exercise Name 4 Cervical ROM rotation/flex/ext  -KG      Cueing 4 Verbal  -KG      Sets 4 1  -KG      Reps 4 15  -KG        User Key  (r) = Recorded By, (t) = Taken By, (c) = Cosigned By    Initials Name Provider Type    Tran Ruiz, PT Physical Therapist           Manual Rx (last 36 hours)      Manual Treatments     Row Name 08/07/18 1100             Manual Rx 1    Manual Rx 1 Location Sub-occipitals, B UT/levator, B cervical parapsinals  -KG      Manual Rx 1 Type STM/MFR  -KG      Manual Rx 1 Duration 6 min  -KG        User Key  (r) = Recorded By, (t) = Taken By, (c) = Cosigned By    Initials Name Provider Type    Tran Ruiz, PT Physical Therapist                      Outcome Measure Options: Neck Disability Index (NDI)  Neck Disability Index  Section 1 - Pain Intensity: The pain is moderate at the moment.  Section 2 - Personal Care: I can look after myself normally without causing extra pain.  Section 3 - Lifting: I can lift heavy weights without causing extra pain  Section 4 - Work: I can do as much work as I " want.  Section 5 - Headaches: I have slight headaches that come infrequently.  Section 6 - Concentration: I can concentrate fully without difficulty.  Section 7 - Sleeping: My sleep is slightly disturbed for less than 1 hour.  Section 8 - Driving: I can drive as long as I want with slight neck pain.  Section 9 - Reading: I can read as much as I want with moderate neck pain.  Section 10 - Recreation: I have some neck pain with all recreational activities.  Neck Disability Index Score: 8  Neck Disability Index Comments: 16%      Time Calculation:     Therapy Suggested Charges     Code   Minutes Charges    None             Start Time: 1111  Stop Time: 1156  Time Calculation (min): 45 min  Total Timed Code Minutes- PT: 20 minute(s)     Therapy Charges for Today     Code Description Service Date Service Provider Modifiers Qty    78293137062 HC PT CHNG MAIN POS CURRENT 8/7/2018 Tran Vega, PT GP, CJ 1    71344461816 HC PT CHNG MAIN POS PROJECTED 8/7/2018 Tran Vega, PT GP, CI 1    55294489247 HC PT THER PROC EA 15 MIN 8/7/2018 Tran Vega, PT GP 1    88140483343 HC PT EVAL LOW COMPLEXITY 2 8/7/2018 Tran Vega, PT GP 1          PT G-Codes  PT Professional Judgement Used?: Yes  Outcome Measure Options: Neck Disability Index (NDI)  Score: 16%  Functional Limitation: Changing and maintaining body position  Changing and Maintaining Body Position Current Status (): At least 20 percent but less than 40 percent impaired, limited or restricted  Changing and Maintaining Body Position Goal Status (): At least 1 percent but less than 20 percent impaired, limited or restricted         Tran Vega, LANCE  8/7/2018

## 2018-08-08 ENCOUNTER — NURSE ONLY (OUTPATIENT)
Dept: PRIMARY CARE CLINIC | Age: 73
End: 2018-08-08
Payer: MEDICARE

## 2018-08-08 DIAGNOSIS — E87.5 SERUM POTASSIUM ELEVATED: Primary | ICD-10-CM

## 2018-08-08 LAB
ALBUMIN SERPL-MCNC: 4.2 G/DL (ref 3.5–5.2)
ALP BLD-CCNC: 81 U/L (ref 35–104)
ALT SERPL-CCNC: 10 U/L (ref 5–33)
ANION GAP SERPL CALCULATED.3IONS-SCNC: 12 MMOL/L (ref 7–19)
AST SERPL-CCNC: 15 U/L (ref 5–32)
BILIRUB SERPL-MCNC: 0.4 MG/DL (ref 0.2–1.2)
BUN BLDV-MCNC: 26 MG/DL (ref 8–23)
CALCIUM SERPL-MCNC: 9.8 MG/DL (ref 8.8–10.2)
CHLORIDE BLD-SCNC: 99 MMOL/L (ref 98–111)
CO2: 26 MMOL/L (ref 22–29)
CREAT SERPL-MCNC: 1.1 MG/DL (ref 0.5–0.9)
GFR NON-AFRICAN AMERICAN: 49
GLUCOSE BLD-MCNC: 101 MG/DL (ref 74–109)
POTASSIUM SERPL-SCNC: 5 MMOL/L (ref 3.5–5)
SODIUM BLD-SCNC: 137 MMOL/L (ref 136–145)
TOTAL PROTEIN: 7.1 G/DL (ref 6.6–8.7)

## 2018-08-08 PROCEDURE — 36415 COLL VENOUS BLD VENIPUNCTURE: CPT | Performed by: FAMILY MEDICINE

## 2018-08-09 ENCOUNTER — PATIENT MESSAGE (OUTPATIENT)
Dept: PRIMARY CARE CLINIC | Age: 73
End: 2018-08-09

## 2018-08-09 DIAGNOSIS — Z23 NEED FOR SHINGLES VACCINE: ICD-10-CM

## 2018-08-09 DIAGNOSIS — Z23 NEED FOR TDAP VACCINATION: Primary | ICD-10-CM

## 2018-08-09 NOTE — TELEPHONE ENCOUNTER
From: Madhu Carroll  To: Cailin Meléndez MD  Sent: 8/9/2018 5:02 PM CDT  Subject: Non-Urgent Medical Question    Last week my  Karen Awad and I were there for lab work, first visit, etc. It was noted on My Chart that we both needed a Tetanus and a Shingles shot. We were told that we could get those from our Pharmacy. We checked with [facility] and were told that the Shingles would not be available until first of the year. In addition, we were told they could not give us the Tetanus shot without a prescription from our physician. Could you please fax a prescription for this shot for Karen Awad and Kristan Andrade to [facility]? Thank you.

## 2018-08-15 ENCOUNTER — HOSPITAL ENCOUNTER (OUTPATIENT)
Dept: PHYSICAL THERAPY | Facility: HOSPITAL | Age: 73
Setting detail: THERAPIES SERIES
Discharge: HOME | End: 2018-08-15

## 2018-08-15 DIAGNOSIS — M54.2 NECK PAIN: Primary | ICD-10-CM

## 2018-08-15 PROCEDURE — 97110 THERAPEUTIC EXERCISES: CPT | Performed by: PHYSICAL THERAPIST

## 2018-08-15 PROCEDURE — 97140 MANUAL THERAPY 1/> REGIONS: CPT | Performed by: PHYSICAL THERAPIST

## 2018-08-15 NOTE — THERAPY TREATMENT NOTE
Outpatient Physical Therapy Ortho Treatment Note  [facility]     Patient Name: Vanessa Kamara  : 1945  MRN: [id number]  Today's Date: 8/15/2018      Visit Date: 08/15/2018     Subjective Improvement:   0%    Attendance:   Approved:   Medicare guidelines        MD follow up:   PRN       RC date:  18     Visit Dx:    ICD-10-CM ICD-9-CM   1. Neck pain M54.2 723.1       There is no problem list on file for this patient.       Past Medical History:   Diagnosis Date   • Disease of thyroid gland    • GERD (gastroesophageal reflux disease)    • Hiatal hernia         Past Surgical History:   Procedure Laterality Date   • BREAST BIOPSY      Benign breast tumors   • CHOLECYSTECTOMY     • COLON RESECTION     • HAND SURGERY Right    • HYSTERECTOMY               PT Ortho     Row Name 08/15/18 09       Precautions and Contraindications    Precautions/Limitations no known precautions/limitations  -KG    Precautions R rotator cuff tear  -KG       Subjective Pain    Post-Treatment Pain Level 0  -KG       Posture/Observations    Posture/Observations Comments No acute distress. Forward head, rounded shoulders posture.  -KG      User Key  (r) = Recorded By, (t) = Taken By, (c) = Cosigned By    Initials Name Provider Type    KG Tran Vega, PT Physical Therapist                            PT Assessment/Plan     Row Name 08/15/18 0900          PT Assessment    Functional Limitations Performance in leisure activities;Limitation in home management;Limitations in community activities  -KG     Impairments Impaired flexibility;Joint mobility;Muscle strength;Pain;Posture;Poor body mechanics;Range of motion  -KG     Assessment Comments Modified HEP to avoid cervical AROM at this time due to increased pain. Added no moneys to HEP, which pt performed fairly well. Educated pt not to push through the pain when doing HEP. Pt TTP/taut at R UT/levator with rolling knots. Good repsonse to manual with reports of  improved pain. Cervical distraction causes increased pain.  -KG     Rehab Potential Good  -KG     Patient/caregiver participated in establishment of treatment plan and goals Yes  -KG     Patient would benefit from skilled therapy intervention Yes  -KG        PT Plan    PT Frequency 2x/week  -KG     Predicted Duration of Therapy Intervention (Therapy Eval) 4 weeks  -KG     PT Plan Comments Continue manual work; postural strengthening; possible tband mid rows next visit, isometrics  -KG       User Key  (r) = Recorded By, (t) = Taken By, (c) = Cosigned By    Initials Name Provider Type    KG Tran Vega, PT Physical Therapist                Modalities     Row Name 08/15/18 0900             Subjective Pain    Pre-Treatment Pain Level 2   At rest  -KG      Subjective Pain Comment 6/10 when turning head towards R  -KG         Moist Heat    MH Applied No   Pt requested to use MHP at home  -KG        User Key  (r) = Recorded By, (t) = Taken By, (c) = Cosigned By    Initials Name Provider Type    KG Tran Vega, PT Physical Therapist                Exercises     Row Name 08/15/18 0900             Precautions    Existing Precautions/Restrictions no known precautions/restrictions  -KG         Subjective Comments    Subjective Comments Pt states some of the exercises hurt but she's been doing them. States her neck feels more stiff/painful than it was, theresa when turning head towards R.  -KG         Subjective Pain    Able to rate subjective pain? yes  -KG      Pre-Treatment Pain Level 2   At rest  -KG      Post-Treatment Pain Level 0  -KG      Subjective Pain Comment 6/10 when turning head towards R  -KG         Aquatics    Aquatics performed? No  -KG         Exercise 1    Exercise Name 1 Pro II for ROM/endurance/posture  -KG      Cueing 1 Verbal  -KG      Time 1 6 min  -KG      Additional Comments L 2.0; fwd/reverse  -KG         Exercise 2    Exercise Name 2 Scap retraction  -KG      Cueing 2 Verbal  -KG      Sets 2  "2  -KG      Reps 2 10  -KG      Time 2 3\" hold  -KG         Exercise 3    Exercise Name 3 UT stretch R  -KG      Cueing 3 Verbal  -KG      Reps 3 2  -KG      Time 3 30\" hold  -KG         Exercise 4    Exercise Name 4 Levator stretch R  -KG      Cueing 4 Verbal  -KG      Sets 4 1  -KG      Reps 4 30\" hold  -KG         Exercise 5    Exercise Name 5 No moneys  -KG      Cueing 5 Verbal  -KG      Sets 5 2  -KG      Reps 5 10  -KG         Exercise 6    Exercise Name 6 Tband shoulder ext  -KG      Cueing 6 Verbal  -KG      Sets 6 2  -KG      Reps 6 10  -KG      Additional Comments Yellow  -KG         Exercise 7    Exercise Name 7 Supine chin tucks  -KG      Cueing 7 Verbal;Tactile  -KG      Sets 7 2  -KG      Reps 7 10  -KG        User Key  (r) = Recorded By, (t) = Taken By, (c) = Cosigned By    Initials Name Provider Type    Tran Ruiz, PT Physical Therapist                        Manual Rx (last 36 hours)      Manual Treatments     Row Name 08/15/18 0900             Manual Rx 1    Manual Rx 1 Location Sub-occipitals, R UT/levator, B cervical parapsinals, scalenes  -KG      Manual Rx 1 Type STM/MFR  -KG      Manual Rx 1 Duration 15 min  -KG        User Key  (r) = Recorded By, (t) = Taken By, (c) = Cosigned By    Initials Name Provider Type    KG Tran Vega, PT Physical Therapist                PT OP Goals     Row Name 08/15/18 0900          PT Short Term Goals    STG Date to Achieve 08/28/18  -KG     STG 1 Independent/compliant with HEP  -KG     STG 1 Progress Progressing  -KG     STG 2 Tolerate 45 minute treatment session without increased pain  -KG     STG 2 Progress Progressing  -KG     STG 3 Demonstrate cervical ext AROM to 45 deg without increased pain  -KG     STG 3 Progress Progressing  -KG        Long Term Goals    LTG Date to Achieve 09/11/18  -KG     LTG 1 Subjectively report 60% improvement or greater  -KG     LTG 1 Progress Progressing  -KG     LTG 2 Improve NDI score to 10% or less  -KG     " LTG 2 Progress Progressing  -KG     LTG 3 Demonstrate R cervical rotation to 60 deg or greater without increased pain  -KG     LTG 3 Progress Progressing  -KG     LTG 4 Demonstrate independence in proper posture/body mechanics throughout treatment session without cuing  -KG     LTG 4 Progress Progressing  -KG     LTG 5 Demonstrate R cervical lat flex AROM within 5 deg of L lat flex without increased pain  -KG     LTG 5 Progress Progressing  -KG     LTG 6 Demonstrate decrease pain/TTP at bilateral sub-occipitals by 75%  -KG     LTG 6 Progress Progressing  -KG        Time Calculation    PT Goal Re-Cert Due Date 08/28/18  -KG       User Key  (r) = Recorded By, (t) = Taken By, (c) = Cosigned By    Initials Name Provider Type    KG Tarn Vega, PT Physical Therapist          Therapy Education  Education Details: Modified HEP to avoid increased pain; discharged cervical rotation AROM. Added no moneys  Given: HEP, Symptoms/condition management, Pain management, Posture/body mechanics  Program: Modified  How Provided: Verbal, Demonstration, Written  Provided to: Patient  Level of Understanding: Teach back education performed, Verbalized, Demonstrated              Time Calculation:   Start Time: 0935  Stop Time: 1017  Time Calculation (min): 42 min  Total Timed Code Minutes- PT: 42 minute(s)  Therapy Suggested Charges     Code   Minutes Charges    None           Therapy Charges for Today     Code Description Service Date Service Provider Modifiers Qty    14505896603 HC PT THER PROC EA 15 MIN 8/15/2018 Tran Vega, PT GP 2    57612987081 HC PT MANUAL THERAPY EA 15 MIN 8/15/2018 Tran Vega, PT GP 1                    Tran Vega PT  8/15/2018

## 2018-08-17 ENCOUNTER — HOSPITAL ENCOUNTER (OUTPATIENT)
Dept: PHYSICAL THERAPY | Facility: HOSPITAL | Age: 73
Setting detail: THERAPIES SERIES
Discharge: HOME | End: 2018-08-17

## 2018-08-17 DIAGNOSIS — M54.2 NECK PAIN: Primary | ICD-10-CM

## 2018-08-17 PROCEDURE — 97140 MANUAL THERAPY 1/> REGIONS: CPT | Performed by: PHYSICAL THERAPIST

## 2018-08-17 PROCEDURE — 97110 THERAPEUTIC EXERCISES: CPT | Performed by: PHYSICAL THERAPIST

## 2018-08-17 NOTE — THERAPY TREATMENT NOTE
Outpatient Physical Therapy Ortho Treatment Note  [facility]     Patient Name: Vanessa Kamara  : 1945  MRN: [id number]  Today's Date: 2018      Visit Date: 2018     Subjective Improvement:   0%    Attendance: 3/3  Approved:   Medicare guidelines        MD follow up:   PRN       RC date:  18     Visit Dx:    ICD-10-CM ICD-9-CM   1. Neck pain M54.2 723.1       There is no problem list on file for this patient.       Past Medical History:   Diagnosis Date   • Disease of thyroid gland    • GERD (gastroesophageal reflux disease)    • Hiatal hernia         Past Surgical History:   Procedure Laterality Date   • BREAST BIOPSY      Benign breast tumors   • CHOLECYSTECTOMY     • COLON RESECTION     • HAND SURGERY Right    • HYSTERECTOMY               PT Ortho     Row Name 18 0900       Subjective Comments    Subjective Comments Pt states adjusting the exercises have helped and her neck may feel a little better. States the manual on her neck helps more than anything.  -KG       Precautions and Contraindications    Precautions/Limitations no known precautions/limitations  -KG    Precautions R rotator cuff tear  -KG       Subjective Pain    Able to rate subjective pain? yes  -KG    Pre-Treatment Pain Level 4  -KG    Post-Treatment Pain Level 1  -KG       Posture/Observations    Posture/Observations Comments No acute distress. Forward head, rounded shoulders posture.  -KG    Row Name 08/15/18 0900       Precautions and Contraindications    Precautions/Limitations no known precautions/limitations  -KG    Precautions R rotator cuff tear  -KG       Subjective Pain    Post-Treatment Pain Level 0  -KG       Posture/Observations    Posture/Observations Comments No acute distress. Forward head, rounded shoulders posture.  -KG      User Key  (r) = Recorded By, (t) = Taken By, (c) = Cosigned By    Initials Name Provider Type    Tran Ruiz, PT Physical Therapist                             PT Assessment/Plan     Row Name 08/17/18 0900          PT Assessment    Functional Limitations Performance in leisure activities;Limitation in home management;Limitations in community activities  -KG     Impairments Impaired flexibility;Joint mobility;Muscle strength;Pain;Posture;Poor body mechanics;Range of motion  -KG     Assessment Comments Pt with better performance with cervical stretches this date but continues to require cues. Postural cues required throughout treatment. Continues to respond well to manual but does not tolerate distraction.  -KG     Rehab Potential Good  -KG     Patient/caregiver participated in establishment of treatment plan and goals Yes  -KG     Patient would benefit from skilled therapy intervention Yes  -KG        PT Plan    PT Frequency 2x/week  -KG     Predicted Duration of Therapy Intervention (Therapy Eval) 4 weeks  -KG     PT Plan Comments Continue isometrics, cervical stabilization/strengthening.  -KG       User Key  (r) = Recorded By, (t) = Taken By, (c) = Cosigned By    Initials Name Provider Type    Tran Ruiz, PT Physical Therapist                Modalities     Row Name 08/17/18 0900             Moist Heat    MH Applied No   Pt requested to use MHP at home  -KG        User Key  (r) = Recorded By, (t) = Taken By, (c) = Cosigned By    Initials Name Provider Type    Tran Ruiz, PT Physical Therapist                Exercises     Row Name 08/17/18 0900             Precautions    Existing Precautions/Restrictions no known precautions/restrictions  -KG         Subjective Comments    Subjective Comments Pt states adjusting the exercises have helped and her neck may feel a little better. States the manual on her neck helps more than anything.  -KG         Subjective Pain    Able to rate subjective pain? yes  -KG      Pre-Treatment Pain Level 4  -KG      Post-Treatment Pain Level 1  -KG         Aquatics    Aquatics performed? No  -KG          "Exercise 1    Exercise Name 1 Pro II for ROM/endurance/posture  -KG      Cueing 1 Verbal  -KG      Time 1 7 min  -KG      Additional Comments L 2.0; fwd/reverse  -KG         Exercise 2    Exercise Name 2 UT stretch R  -KG      Cueing 2 Verbal  -KG      Reps 2 2  -KG      Time 2 30\" hold  -KG         Exercise 3    Exercise Name 3 Levator stretch R  -KG      Cueing 3 Verbal  -KG      Reps 3 2  -KG      Time 3 30\" hold  -KG         Exercise 4    Exercise Name 4 --  -KG      Cueing 4 --  -KG      Sets 4 --  -KG      Reps 4 --  -KG         Exercise 5    Exercise Name 5 No moneys  -KG      Cueing 5 Verbal  -KG      Sets 5 2  -KG      Reps 5 10  -KG         Exercise 6    Exercise Name 6 Tband shoulder ext  -KG      Cueing 6 Verbal  -KG      Sets 6 2  -KG      Reps 6 10  -KG      Additional Comments Yellow  -KG         Exercise 7    Exercise Name 7 Tband mid rows  -KG      Cueing 7 Verbal  -KG      Sets 7 2  -KG      Reps 7 10  -KG      Additional Comments Yellow  -KG         Exercise 8    Exercise Name 8 Cervical isometrics B SB/flex  -KG      Cueing 8 Verbal  -KG      Sets 8 1  -KG      Reps 8 10  -KG      Time 8 5\" hold  -KG         Exercise 9    Exercise Name 9 Supine chin tucks  -KG      Cueing 9 Verbal  -KG      Sets 9 2  -KG      Reps 9 10  -KG        User Key  (r) = Recorded By, (t) = Taken By, (c) = Cosigned By    Initials Name Provider Type    Tran Ruiz, PT Physical Therapist                        Manual Rx (last 36 hours)      Manual Treatments     Row Name 08/17/18 0900             Manual Rx 1    Manual Rx 1 Location Sub-occipitals, R UT/levator, B cervical parapsinals, scalenes  -KG      Manual Rx 1 Type STM/MFR  -KG      Manual Rx 1 Duration 16 min  -KG        User Key  (r) = Recorded By, (t) = Taken By, (c) = Cosigned By    Initials Name Provider Type    Tran Ruiz, PT Physical Therapist                PT OP Goals     Row Name 08/17/18 0900          Time Calculation    PT Goal Re-Cert " Due Date 08/28/18  -KG       User Key  (r) = Recorded By, (t) = Taken By, (c) = Cosigned By    Initials Name Provider Type    Tran Ruiz, PT Physical Therapist          Therapy Education  Given: HEP, Symptoms/condition management, Pain management, Posture/body mechanics  Program: Reinforced  How Provided: Verbal  Provided to: Patient  Level of Understanding: Verbalized, Demonstrated              Time Calculation:   Start Time: 0936  Stop Time: 1018  Time Calculation (min): 42 min  Total Timed Code Minutes- PT: 42 minute(s)  Therapy Suggested Charges     Code   Minutes Charges    None           Therapy Charges for Today     Code Description Service Date Service Provider Modifiers Qty    78240796901 HC PT THER PROC EA 15 MIN 8/17/2018 Tran Vega, PT GP 2    86938842163 HC PT MANUAL THERAPY EA 15 MIN 8/17/2018 Tran Vega, PT GP 1                    Tran Vega, PT  8/17/2018

## 2018-08-22 ENCOUNTER — APPOINTMENT (OUTPATIENT)
Dept: PHYSICAL THERAPY | Facility: HOSPITAL | Age: 73
End: 2018-08-22

## 2018-08-23 ENCOUNTER — OFFICE VISIT (OUTPATIENT)
Dept: PRIMARY CARE CLINIC | Age: 73
End: 2018-08-23
Payer: MEDICARE

## 2018-08-23 VITALS
HEIGHT: 63 IN | SYSTOLIC BLOOD PRESSURE: 124 MMHG | BODY MASS INDEX: 37.74 KG/M2 | OXYGEN SATURATION: 98 % | WEIGHT: 213 LBS | RESPIRATION RATE: 22 BRPM | DIASTOLIC BLOOD PRESSURE: 84 MMHG | TEMPERATURE: 97.1 F | HEART RATE: 62 BPM

## 2018-08-23 DIAGNOSIS — F51.04 PSYCHOPHYSIOLOGICAL INSOMNIA: ICD-10-CM

## 2018-08-23 DIAGNOSIS — F41.9 ANXIETY: Primary | ICD-10-CM

## 2018-08-23 PROCEDURE — 99213 OFFICE O/P EST LOW 20 MIN: CPT | Performed by: FAMILY MEDICINE

## 2018-08-23 PROCEDURE — G8399 PT W/DXA RESULTS DOCUMENT: HCPCS | Performed by: FAMILY MEDICINE

## 2018-08-23 PROCEDURE — 1101F PT FALLS ASSESS-DOCD LE1/YR: CPT | Performed by: FAMILY MEDICINE

## 2018-08-23 PROCEDURE — 4040F PNEUMOC VAC/ADMIN/RCVD: CPT | Performed by: FAMILY MEDICINE

## 2018-08-23 PROCEDURE — G8427 DOCREV CUR MEDS BY ELIG CLIN: HCPCS | Performed by: FAMILY MEDICINE

## 2018-08-23 PROCEDURE — 1090F PRES/ABSN URINE INCON ASSESS: CPT | Performed by: FAMILY MEDICINE

## 2018-08-23 PROCEDURE — 1036F TOBACCO NON-USER: CPT | Performed by: FAMILY MEDICINE

## 2018-08-23 PROCEDURE — 3017F COLORECTAL CA SCREEN DOC REV: CPT | Performed by: FAMILY MEDICINE

## 2018-08-23 PROCEDURE — 1123F ACP DISCUSS/DSCN MKR DOCD: CPT | Performed by: FAMILY MEDICINE

## 2018-08-23 PROCEDURE — G8417 CALC BMI ABV UP PARAM F/U: HCPCS | Performed by: FAMILY MEDICINE

## 2018-08-23 ASSESSMENT — ENCOUNTER SYMPTOMS
COUGH: 0
DIARRHEA: 0
VOMITING: 0
NAUSEA: 0
EYE DISCHARGE: 0
COLOR CHANGE: 0
ABDOMINAL PAIN: 0
WHEEZING: 0
BACK PAIN: 0

## 2018-08-23 NOTE — PROGRESS NOTES
SUBJECTIVE:    Patient ID: Madhu Carroll is a 67 y.o. female. HPI:   Patient is here today for complaints of waking up in the middle the night with racing thoughts and heart racing. She states that she has had a history of anxiety. She wants to know if this could be anxiety that is causing this. She states that she is typically not sleeping well. She states that she tosses and turns a lot at night. She denies any chest pain. She denies any fluttering in her chest or palpitations. She states that she has never had A. fib in the past.  She states that she is taking her medications as prescribed and has not changed any of her medications recently. She states that she is leery of taking medications to help with anxiety. She states that she has been on Zoloft previously and this worked very well for her but she did not take it for very long because she did not want to be on medications if she did not have to be.     Past Medical History:   Diagnosis Date    Carpal tunnel syndrome, right     HIGH CHOLESTEROL     Hypertension     Hypothyroidism       Current Outpatient Prescriptions   Medication Sig Dispense Refill    sertraline (ZOLOFT) 50 MG tablet Take 1 tablet by mouth daily 30 tablet 3    Cholecalciferol (VITAMIN D3) 2000 units CAPS Take by mouth      diphenoxylate-atropine (DIPHENATOL) 2.5-0.025 MG per tablet Take 1 tablet by mouth 4 times daily as needed for Diarrhea 30 tablet 2    levothyroxine (SYNTHROID) 125 MCG tablet TAKE ONE TABLET BY MOUTH EVERY DAY (Patient taking differently: TAKE ONE TABLET BY MOUTH EVERY DAY  Name Brand Only) 30 tablet 0    indapamide (LOZOL) 2.5 MG tablet TAKE ONE TABLET BY MOUTH EVERY MORNING 30 tablet 5    propranolol (INDERAL LA) 120 MG extended release capsule TAKE ONE CAPSULE BY MOUTH EVERY DAY 30 capsule 5    pravastatin (PRAVACHOL) 20 MG tablet TAKE ONE TABLET BY MOUTH AT BEDTIME 30 tablet 5    candesartan (ATACAND) 32 MG tablet TAKE ONE TABLET BY MOUTH Psychiatric: She has a normal mood and affect. Her behavior is normal. Judgment and thought content normal.      /84 (Site: Right Arm, Position: Sitting, Cuff Size: Medium Adult)   Pulse 62   Temp 97.1 °F (36.2 °C) (Temporal)   Resp 22   Ht 5' 3\" (1.6 m)   Wt 213 lb (96.6 kg)   SpO2 98%   BMI 37.73 kg/m²      ASSESSMENT:    [facility] was seen today for panic attack. Diagnoses and all orders for this visit:    Anxiety    Psychophysiological insomnia    Other orders  -     sertraline (ZOLOFT) 50 MG tablet; Take 1 tablet by mouth daily        PLAN:    We're going to start Zoloft to see if this will help with her anxiety and in turn help with her sleep. She is to let us know if her symptoms are not improving. I would like to see her back in 4 weeks for a recheck. EMR Dragon/transcription disclaimer:  Much of this encounter note is electronic transcription/translation of spoken language to printed texts. The electronic translation of spoken language may be erroneous, or at times, nonsensical words or phrases may be inadvertently transcribed.   Although I have reviewed the note for such errors, some may still exist.

## 2018-08-24 ENCOUNTER — APPOINTMENT (OUTPATIENT)
Dept: PHYSICAL THERAPY | Facility: HOSPITAL | Age: 73
End: 2018-08-24

## 2018-09-05 ENCOUNTER — HOSPITAL ENCOUNTER (OUTPATIENT)
Dept: PHYSICAL THERAPY | Facility: HOSPITAL | Age: 73
Setting detail: THERAPIES SERIES
Discharge: HOME | End: 2018-09-05

## 2018-09-05 ENCOUNTER — PATIENT MESSAGE (OUTPATIENT)
Dept: PRIMARY CARE CLINIC | Age: 73
End: 2018-09-05

## 2018-09-05 DIAGNOSIS — Z23 NEED FOR INFLUENZA VACCINATION: Primary | ICD-10-CM

## 2018-09-05 DIAGNOSIS — M54.2 NECK PAIN: Primary | ICD-10-CM

## 2018-09-05 PROCEDURE — G8982 BODY POS GOAL STATUS: HCPCS | Performed by: PHYSICAL THERAPIST

## 2018-09-05 PROCEDURE — G8981 BODY POS CURRENT STATUS: HCPCS | Performed by: PHYSICAL THERAPIST

## 2018-09-05 PROCEDURE — 97110 THERAPEUTIC EXERCISES: CPT | Performed by: PHYSICAL THERAPIST

## 2018-09-05 PROCEDURE — 97140 MANUAL THERAPY 1/> REGIONS: CPT | Performed by: PHYSICAL THERAPIST

## 2018-09-05 NOTE — THERAPY PROGRESS REPORT/RE-CERT
Outpatient Physical Therapy Ortho Progress Note  [facility]     Patient Name: Vanessa Kamara  : 1945  MRN: [id number]  Today's Date: 2018      Visit Date: 2018     Subjective Improvement:   30%    Attendance:   Approved:   Medicare guidelines        MD follow up:   PRN       RC date:  18       There is no problem list on file for this patient.       Past Medical History:   Diagnosis Date   • Disease of thyroid gland    • GERD (gastroesophageal reflux disease)    • Hiatal hernia         Past Surgical History:   Procedure Laterality Date   • BREAST BIOPSY      Benign breast tumors   • CHOLECYSTECTOMY     • COLON RESECTION     • HAND SURGERY Right    • HYSTERECTOMY         Visit Dx:     ICD-10-CM ICD-9-CM   1. Neck pain M54.2 723.1                 PT Ortho     Row Name 18 1600       Subjective Comments    Subjective Comments Pt states she has had to miss the past couple of weeks due an issue with her hand. States she dislocated her 3rd digit at [facility], had it splinted for a while. States she came off her anti-inflammatories which aggrevated her neck a little. States she continued to do her HEP. Reports 30% overall improvement. Still uncomfortable at night.  -KG       Precautions and Contraindications    Precautions/Limitations no known precautions/limitations  -KG    Precautions R rotator cuff tear  -KG       Subjective Pain    Able to rate subjective pain? yes  -KG    Pre-Treatment Pain Level 3  -KG    Post-Treatment Pain Level 1  -KG       Posture/Observations    Posture/Observations Comments No acute distress. Forward head, rounded shoulders posture.  -KG       Sensory Screen for Light Touch- Upper Quarter Clearing    C4 (posterior shoulder) Bilateral:;Intact  -KG    C5 (lateral upper arm) Bilateral:;Intact  -KG    C6 (tip of thumb) Bilateral:;Intact  -KG    C7 (tip of 3rd finger) Bilateral:;Intact  -KG    C8 (tip of 5th finger) Bilateral:;Intact  -KG    T1 (medial  lower arm) Bilateral:;Intact  -KG       Cervical Palpation    Suboccipital Bilateral:;Tender  -KG    Cervical Facets Right:;Tender  -KG    Scalenes Right:;Tender;Guarded/taut  -KG    Levator Scapula Right:;Tender;Guarded/taut  -KG    Upper Traps Right:;Tender;Guarded/taut  -KG       Head/Neck/Trunk    Neck Extension AROM 40; pull post neck  -KG    Neck Flexion AROM 55  -KG    Neck Lt Lateral Flexion AROM 35; pull contralateral side  -KG    Neck Rt Lateral Flexion AROM 45; pull contralateral side  -KG    Neck Lt Rotation AROM 60  -KG    Neck Rt Rotation AROM 48  -KG       Right Upper Ext    Rt Shoulder Abduction AROM 85  -KG    Rt Shoulder Flexion AROM 130  -KG    Rt Upper Extremity Comments  No neck pain; hx of R RTC tear  -KG       Left Upper Ext    Lt Shoulder Abduction AROM 157  -KG    Lt Shoulder Flexion AROM 160  -KG    Lt Upper Extremity Comments  No pain  -KG       Left Shoulder (Manual Muscle Testing)    Comment, MMT: Left Shoulder Flex 4+/5, abd 4+/5  -KG       Right Shoulder (Manual Muscle Testing)    Comment, MMT: Right Shoulder Deferred due to lack of shoulder ROM  -KG      User Key  (r) = Recorded By, (t) = Taken By, (c) = Cosigned By    Initials Name Provider Type    KG Tran Vega, PT Physical Therapist                      Therapy Education  Education Details: Added tband rows & shoulder ext  Given: HEP, Symptoms/condition management, Pain management, Posture/body mechanics  Program: Progressed, Reinforced  How Provided: Verbal, Demonstration  Provided to: Patient  Level of Understanding: Teach back education performed, Verbalized, Demonstrated           PT OP Goals     Row Name 09/05/18 1600          PT Short Term Goals    STG Date to Achieve 09/19/18  -KG     STG 1 Independent/compliant with HEP  -KG     STG 1 Progress Met  -KG     STG 2 Tolerate 45 minute treatment session without increased pain  -KG     STG 2 Progress Partially Met  -KG     STG 3 Demonstrate cervical ext AROM to 45 deg  without increased pain  -KG     STG 3 Progress Partially Met  -KG        Long Term Goals    LTG Date to Achieve 09/26/18  -KG     LTG 1 Subjectively report 60% improvement or greater  -KG     LTG 1 Progress Progressing  -KG     LTG 2 Improve NDI score to 10% or less  -KG     LTG 2 Progress Progressing  -KG     LTG 3 Demonstrate R cervical rotation to 60 deg or greater without increased pain  -KG     LTG 3 Progress Progressing  -KG     LTG 4 Demonstrate independence in proper posture/body mechanics throughout treatment session without cuing  -KG     LTG 4 Progress Progressing  -KG     LTG 5 Demonstrate R cervical lat flex AROM within 5 deg of L lat flex without increased pain  -KG     LTG 5 Progress Met  -KG     LTG 6 Demonstrate decrease pain/TTP at bilateral sub-occipitals by 75%  -KG     LTG 6 Progress Progressing  -KG        Time Calculation    PT Goal Re-Cert Due Date 09/26/18  -KG       User Key  (r) = Recorded By, (t) = Taken By, (c) = Cosigned By    Initials Name Provider Type    KG Tran Vega, PT Physical Therapist                PT Assessment/Plan     Row Name 09/05/18 1600          PT Assessment    Functional Limitations Performance in leisure activities;Limitation in home management;Limitations in community activities  -KG     Impairments Impaired flexibility;Joint mobility;Muscle strength;Pain;Posture;Poor body mechanics;Range of motion  -KG     Assessment Comments Pt has progressed fairly well with PT at this time as evidenced by improvements in cervical mobility and overall activity tolerance. Pt had to miss ~2 weeks of PT due another injury involving her hand. Pt continued to complete HEP independently during her time off and reports 30% overall subjective improvement. Continues to have limited cervical rotation R >L but responds well to manual. Overall postural awarness improving but continues to require cuing. Pt will continue to benefit from skilled PT services to further improve functional  mobility & overall postural strengthening.  -KG     Rehab Potential Good  -KG     Patient/caregiver participated in establishment of treatment plan and goals Yes  -KG     Patient would benefit from skilled therapy intervention Yes  -KG        PT Plan    PT Frequency 2x/week  -KG     Predicted Duration of Therapy Intervention (Therapy Eval) 3-4 weeks  -KG     PT Plan Comments Resume cervical isometrics next visit; continue postural strengthening and manual.   -KG       User Key  (r) = Recorded By, (t) = Taken By, (c) = Cosigned By    Initials Name Provider Type    ARMOND Tran Vega, PT Physical Therapist                Modalities     Row Name 09/05/18 1600             Moist Heat    MH Applied No   Pt requested to use MHP at home  -KG        User Key  (r) = Recorded By, (t) = Taken By, (c) = Cosigned By    Initials Name Provider Type    ARMOND Tran Vega, PT Physical Therapist              Exercises     Row Name 09/05/18 1600             Precautions    Existing Precautions/Restrictions no known precautions/restrictions  -KG         Subjective Comments    Subjective Comments Pt states she has had to miss the past couple of weeks due an issue with her hand. States she dislocated her 3rd digit at [facility], had it splinted for a while. States she came off her anti-inflammatories which aggrevated her neck a little. States she continued to do her HEP. Reports 30% overall improvement. Still uncomfortable at night.  -KG         Subjective Pain    Able to rate subjective pain? yes  -KG      Pre-Treatment Pain Level 3  -KG      Post-Treatment Pain Level 1  -KG         Aquatics    Aquatics performed? No  -KG         Exercise 1    Exercise Name 1 Pro II for ROM/endurance/posture  -KG      Cueing 1 Verbal  -KG      Time 1 10 min  -KG         Exercise 2    Exercise Name 2 No moneys  -KG      Cueing 2 Verbal  -KG      Sets 2 2  -KG      Reps 2 10  -KG      Additional Comments Attemped with band but poor mechanics due to R shoulder  "pain  -KG         Exercise 3    Exercise Name 3 UT stretch R  -KG      Cueing 3 Verbal  -KG      Reps 3 2  -KG      Time 3 30\" hold  -KG         Exercise 4    Exercise Name 4 Tband shoulder ext  -KG      Cueing 4 Verbal  -KG      Sets 4 2  -KG      Reps 4 10  -KG      Additional Comments Yellow  -KG         Exercise 5    Exercise Name 5 Tband mid rows  -KG      Cueing 5 Verbal  -KG      Sets 5 2  -KG      Reps 5 10  -KG      Additional Comments Yellow  -KG         Exercise 6    Exercise Name 6 C-spine rotation ball on wall  -KG      Cueing 6 Verbal  -KG      Sets 6 2  -KG      Reps 6 10  -KG      Additional Comments Red ball  -KG         Exercise 7    Exercise Name 7 Supine chin tucks  -KG      Cueing 7 Verbal  -KG      Sets 7 2  -KG      Reps 7 10  -KG        User Key  (r) = Recorded By, (t) = Taken By, (c) = Cosigned By    Initials Name Provider Type    Tran Ruiz, PT Physical Therapist           Manual Rx (last 36 hours)      Manual Treatments     Row Name 09/05/18 1600             Manual Rx 1    Manual Rx 1 Location Sub-occipitals, R UT/levator, B cervical parapsinals, scalenes  -KG      Manual Rx 1 Type STM/MFR  -KG      Manual Rx 1 Duration 10 min  -KG        User Key  (r) = Recorded By, (t) = Taken By, (c) = Cosigned By    Initials Name Provider Type    Tran Ruiz, PT Physical Therapist                      Outcome Measure Options: Neck Disability Index (NDI)         Time Calculation:     Therapy Suggested Charges     Code   Minutes Charges    None             Start Time: 1602  Stop Time: 1648  Time Calculation (min): 46 min  Total Timed Code Minutes- PT: 46 minute(s)     Therapy Charges for Today     Code Description Service Date Service Provider Modifiers Qty    00605437419  PT CHNG MAIN POS CURRENT 9/5/2018 Tran Vega, PT GP, CJ 1    50720721670 HC PT CHNG MAIN POS PROJECTED 9/5/2018 Tran Vega, PT GP, CI 1    82776007992 HC PT THER PROC EA 15 MIN 9/5/2018 Gary, " Tran NOWAK, PT GP 2    08933745027 HC PT MANUAL THERAPY EA 15 MIN 9/5/2018 Tran Vega, PT GP 1          PT G-Codes  PT Professional Judgement Used?: Yes  Outcome Measure Options: Neck Disability Index (NDI)  Functional Limitation: Changing and maintaining body position  Changing and Maintaining Body Position Current Status (): At least 20 percent but less than 40 percent impaired, limited or restricted  Changing and Maintaining Body Position Goal Status (): At least 1 percent but less than 20 percent impaired, limited or restricted         Tran Vega, PT  9/5/2018

## 2018-09-14 ENCOUNTER — HOSPITAL ENCOUNTER (OUTPATIENT)
Dept: PHYSICAL THERAPY | Facility: HOSPITAL | Age: 73
Setting detail: THERAPIES SERIES
End: 2018-09-14

## 2018-09-20 ENCOUNTER — OFFICE VISIT (OUTPATIENT)
Dept: PRIMARY CARE CLINIC | Age: 73
End: 2018-09-20
Payer: MEDICARE

## 2018-09-20 VITALS
HEIGHT: 64 IN | BODY MASS INDEX: 36.19 KG/M2 | OXYGEN SATURATION: 94 % | TEMPERATURE: 97 F | SYSTOLIC BLOOD PRESSURE: 110 MMHG | RESPIRATION RATE: 18 BRPM | DIASTOLIC BLOOD PRESSURE: 76 MMHG | HEART RATE: 70 BPM | WEIGHT: 212 LBS

## 2018-09-20 DIAGNOSIS — F41.0 PANIC ATTACKS: ICD-10-CM

## 2018-09-20 DIAGNOSIS — G47.00 INSOMNIA, UNSPECIFIED TYPE: ICD-10-CM

## 2018-09-20 DIAGNOSIS — F41.9 ANXIETY: Primary | ICD-10-CM

## 2018-09-20 DIAGNOSIS — Z23 NEED FOR INFLUENZA VACCINATION: ICD-10-CM

## 2018-09-20 PROCEDURE — 1101F PT FALLS ASSESS-DOCD LE1/YR: CPT | Performed by: FAMILY MEDICINE

## 2018-09-20 PROCEDURE — G0008 ADMIN INFLUENZA VIRUS VAC: HCPCS | Performed by: FAMILY MEDICINE

## 2018-09-20 PROCEDURE — 99214 OFFICE O/P EST MOD 30 MIN: CPT | Performed by: FAMILY MEDICINE

## 2018-09-20 PROCEDURE — G8399 PT W/DXA RESULTS DOCUMENT: HCPCS | Performed by: FAMILY MEDICINE

## 2018-09-20 PROCEDURE — 90662 IIV NO PRSV INCREASED AG IM: CPT | Performed by: FAMILY MEDICINE

## 2018-09-20 PROCEDURE — 4040F PNEUMOC VAC/ADMIN/RCVD: CPT | Performed by: FAMILY MEDICINE

## 2018-09-20 PROCEDURE — G8417 CALC BMI ABV UP PARAM F/U: HCPCS | Performed by: FAMILY MEDICINE

## 2018-09-20 PROCEDURE — 1090F PRES/ABSN URINE INCON ASSESS: CPT | Performed by: FAMILY MEDICINE

## 2018-09-20 PROCEDURE — 1123F ACP DISCUSS/DSCN MKR DOCD: CPT | Performed by: FAMILY MEDICINE

## 2018-09-20 PROCEDURE — G8427 DOCREV CUR MEDS BY ELIG CLIN: HCPCS | Performed by: FAMILY MEDICINE

## 2018-09-20 PROCEDURE — 1036F TOBACCO NON-USER: CPT | Performed by: FAMILY MEDICINE

## 2018-09-20 PROCEDURE — 3017F COLORECTAL CA SCREEN DOC REV: CPT | Performed by: FAMILY MEDICINE

## 2018-09-20 RX ORDER — LORAZEPAM 0.5 MG/1
0.5 TABLET ORAL EVERY 8 HOURS PRN
Qty: 30 TABLET | Refills: 0 | Status: SHIPPED | OUTPATIENT
Start: 2018-09-20 | End: 2018-10-20

## 2018-09-20 RX ORDER — SERTRALINE HYDROCHLORIDE 100 MG/1
100 TABLET, FILM COATED ORAL DAILY
Qty: 30 TABLET | Refills: 5 | Status: SHIPPED | OUTPATIENT
Start: 2018-09-20 | End: 2019-03-05 | Stop reason: SDUPTHER

## 2018-09-20 ASSESSMENT — ENCOUNTER SYMPTOMS
BACK PAIN: 0
NAUSEA: 1
EYE DISCHARGE: 0
COLOR CHANGE: 0
ABDOMINAL PAIN: 0
DIARRHEA: 0
VOMITING: 0
COUGH: 0
WHEEZING: 0

## 2018-09-20 NOTE — PROGRESS NOTES
SUBJECTIVE:    Patient ID: Alena Buck is a 67 y.o. female. HPI:   Patient presents today for a couple day history of not sleeping well and what she feels like her panic attacks. She states that she has had some intermittent shortness of breath and nausea as well as some palpitations. She states that she has not had any chest pain. She states that she had a heart cath done back about 3 or 4 years ago and this was clear. She states that these episodes have gotten fewer with starting the Zoloft but she states that a couple of days ago she started again. She states that she has not changed any medications recently. She was previously prescribed Xanax by previous physician but never took any of it because she did not know how to make her feel. She still has this in the safe at home. She has not tried taking any of it. She denies any chest pain. She would also like to go ahead and do her flu shot while she is here. Past Medical History:   Diagnosis Date    Carpal tunnel syndrome, right     HIGH CHOLESTEROL     Hypertension     Hypothyroidism       Current Outpatient Prescriptions   Medication Sig Dispense Refill    sertraline (ZOLOFT) 100 MG tablet Take 1 tablet by mouth daily 30 tablet 5    LORazepam (ATIVAN) 0.5 MG tablet Take 1 tablet by mouth every 8 hours as needed for Anxiety for up to 30 days. . 30 tablet 0    Cholecalciferol (VITAMIN D3) 2000 units CAPS Take by mouth      diphenoxylate-atropine (DIPHENATOL) 2.5-0.025 MG per tablet Take 1 tablet by mouth 4 times daily as needed for Diarrhea 30 tablet 2    levothyroxine (SYNTHROID) 125 MCG tablet TAKE ONE TABLET BY MOUTH EVERY DAY (Patient taking differently: TAKE ONE TABLET BY MOUTH EVERY DAY  Name Brand Only) 30 tablet 0    indapamide (LOZOL) 2.5 MG tablet TAKE ONE TABLET BY MOUTH EVERY MORNING 30 tablet 5    propranolol (INDERAL LA) 120 MG extended release capsule TAKE ONE CAPSULE BY MOUTH EVERY DAY 30 capsule 5    Effort normal and breath sounds normal. No respiratory distress. Neurological: She is alert and oriented to person, place, and time. Skin: Skin is warm and dry. She is not diaphoretic. Psychiatric: She has a normal mood and affect. Her behavior is normal. Judgment and thought content normal.      /76 (Site: Left Upper Arm, Position: Sitting, Cuff Size: Medium Adult)   Pulse 70   Temp 97 °F (36.1 °C) (Temporal)   Resp 18   Ht 5' 4\" (1.626 m)   Wt 212 lb (96.2 kg)   SpO2 94%   BMI 36.39 kg/m²      ASSESSMENT:    Trisha Hills was seen today for 1 month follow-up and medication refill. Diagnoses and all orders for this visit:    Anxiety  -     LORazepam (ATIVAN) 0.5 MG tablet; Take 1 tablet by mouth every 8 hours as needed for Anxiety for up to 30 days. .    Need for influenza vaccination  -     INFLUENZA, HIGH DOSE, 65 YRS +, IM, PF, PREFILL SYR, 0.5ML (FLUZONE HD)    Panic attacks    Insomnia, unspecified type    Other orders  -     sertraline (ZOLOFT) 100 MG tablet; Take 1 tablet by mouth daily        PLAN:    Increase Zoloft to 100 mg daily. Ativan sent to the pharmacy for as needed usage. Follow-up with us as scheduled unless needed sooner. EMR Dragon/transcription disclaimer:  Much of this encounter note is electronic transcription/translation of spoken language to printed texts. The electronic translation of spoken language may be erroneous, or at times, nonsensical words or phrases may be inadvertently transcribed.   Although I have reviewed the note for such errors, some may still exist.

## 2018-10-04 ENCOUNTER — TELEPHONE (OUTPATIENT)
Dept: CARDIOLOGY | Age: 73
End: 2018-10-04

## 2018-10-04 ENCOUNTER — OFFICE VISIT (OUTPATIENT)
Dept: CARDIOLOGY | Age: 73
End: 2018-10-04
Payer: MEDICARE

## 2018-10-04 VITALS
HEART RATE: 69 BPM | WEIGHT: 211 LBS | SYSTOLIC BLOOD PRESSURE: 120 MMHG | DIASTOLIC BLOOD PRESSURE: 76 MMHG | HEIGHT: 63 IN | BODY MASS INDEX: 37.39 KG/M2

## 2018-10-04 DIAGNOSIS — R00.2 PALPITATIONS: ICD-10-CM

## 2018-10-04 DIAGNOSIS — R06.00 PND (PAROXYSMAL NOCTURNAL DYSPNEA): ICD-10-CM

## 2018-10-04 DIAGNOSIS — I10 ESSENTIAL HYPERTENSION: ICD-10-CM

## 2018-10-04 DIAGNOSIS — R06.02 SOB (SHORTNESS OF BREATH): Primary | ICD-10-CM

## 2018-10-04 DIAGNOSIS — I10 SYSTEMIC PRIMARY ARTERIAL HYPERTENSION: ICD-10-CM

## 2018-10-04 PROCEDURE — G8399 PT W/DXA RESULTS DOCUMENT: HCPCS | Performed by: INTERNAL MEDICINE

## 2018-10-04 PROCEDURE — 1090F PRES/ABSN URINE INCON ASSESS: CPT | Performed by: INTERNAL MEDICINE

## 2018-10-04 PROCEDURE — 93000 ELECTROCARDIOGRAM COMPLETE: CPT | Performed by: INTERNAL MEDICINE

## 2018-10-04 PROCEDURE — 4040F PNEUMOC VAC/ADMIN/RCVD: CPT | Performed by: INTERNAL MEDICINE

## 2018-10-04 PROCEDURE — G8482 FLU IMMUNIZE ORDER/ADMIN: HCPCS | Performed by: INTERNAL MEDICINE

## 2018-10-04 PROCEDURE — G8417 CALC BMI ABV UP PARAM F/U: HCPCS | Performed by: INTERNAL MEDICINE

## 2018-10-04 PROCEDURE — 3017F COLORECTAL CA SCREEN DOC REV: CPT | Performed by: INTERNAL MEDICINE

## 2018-10-04 PROCEDURE — 1101F PT FALLS ASSESS-DOCD LE1/YR: CPT | Performed by: INTERNAL MEDICINE

## 2018-10-04 PROCEDURE — G8427 DOCREV CUR MEDS BY ELIG CLIN: HCPCS | Performed by: INTERNAL MEDICINE

## 2018-10-04 PROCEDURE — 1036F TOBACCO NON-USER: CPT | Performed by: INTERNAL MEDICINE

## 2018-10-04 PROCEDURE — 1123F ACP DISCUSS/DSCN MKR DOCD: CPT | Performed by: INTERNAL MEDICINE

## 2018-10-04 PROCEDURE — 99212 OFFICE O/P EST SF 10 MIN: CPT | Performed by: INTERNAL MEDICINE

## 2018-10-04 RX ORDER — PRAVASTATIN SODIUM 10 MG
10 TABLET ORAL DAILY
COMMUNITY
End: 2018-10-16 | Stop reason: SDUPTHER

## 2018-10-04 ASSESSMENT — ENCOUNTER SYMPTOMS
SHORTNESS OF BREATH: 1
WHEEZING: 0
APNEA: 0
COUGH: 0
CHEST TIGHTNESS: 0

## 2018-10-04 NOTE — PROGRESS NOTES
ONE CAPSULE BY MOUTH EVERY DAY, Disp: 30 capsule, Rfl: 5    candesartan (ATACAND) 32 MG tablet, TAKE ONE TABLET BY MOUTH EVERY DAY, Disp: 30 tablet, Rfl: 5    esomeprazole (NEXIUM) 40 MG delayed release capsule, TAKE ONE CAPSULE BY MOUTH EVERY MORNING BEFORE BREAKFAST, Disp: 30 capsule, Rfl: 5    guaiFENesin (MUCINEX) 600 MG extended release tablet, Take 1 tablet by mouth 2 times daily (Patient taking differently: Take 600 mg by mouth 2 times daily as needed ), Disp: 60 tablet, Rfl: 1    aspirin 81 MG tablet, Take 81 mg by mouth daily, Disp: , Rfl:     Review of Systems   Constitutional: Negative for activity change (no regular exercise), diaphoresis, fatigue and unexpected weight change. HENT: Negative for congestion. Eyes: Negative for visual disturbance. Respiratory: Positive for shortness of breath. Negative for apnea, cough, chest tightness and wheezing. Several episodes of awakening abruptly at night - SOB  Episodes are short-lived and no CP  Some episodic dyspnea during days   Cardiovascular: Negative for chest pain, palpitations and leg swelling. 5/14 normal cath  Hypertension    Skin: Negative for rash. Neurological: Negative for dizziness, syncope, weakness and light-headedness. /76   Pulse 69   Ht 5' 3\" (1.6 m)   Wt 211 lb (95.7 kg)   BMI 37.38 kg/m²     Last BP Reading:  BP Readings from Last 3 Encounters:   10/04/18 120/76   09/20/18 110/76   08/23/18 124/84        Physical Exam   Constitutional: No distress. Marked obesity   Neck: No JVD present. Cardiovascular: Normal rate, regular rhythm and normal heart sounds. Exam reveals no gallop. No murmur heard. Pulmonary/Chest: She has no rales. Some inspiratory ronchi   Abdominal:   Abdominal obesity   Musculoskeletal: She exhibits edema (1+ Pretibial). Skin: She is not diaphoretic. Vitals reviewed.         Orders Placed This Encounter   Procedures    EKG 12 lead     Order Specific Question:   Reason for Exam?     Answer: Other     No orders of the defined types were placed in this encounter. ECG interpretation from today:   Normal sinus rhythm poor R-wave progression and low voltage in the precordial leads. Assessment / Plan:  1. PND - ventricular function always normal in past. Raises question of MARCELL - will obtain sleep study.   2. Hypertension - pressures well controlled today      Electronically sign by Muna Ledezma MD 10/4/2018 1:45 PM

## 2018-10-04 NOTE — PATIENT INSTRUCTIONS
[place] at the [facility] and [address] located on the first floor of [place] [address] through hospital main entrance and turn immediately to your left. Date/Time:     Patient's contact number:  [phone] (home)     Echocardiogram -  No prep. Takes approximately 30 min. An echocardiogram uses sound waves to produce images of your heart. This commonly used test allows your doctor to see how your heart is beating and pumping blood. Your doctor can use the images from an echocardiogram to identify various abnormalities in the heart muscle and valves. This test has 2 parts:   Ø You will be asked to disrobe from the waist up and given a gown to wear. The technologist will then hook up an EKG monitor to you for the entire exam.   Ø You will then have an ultrasound of your heart (echocardiogram) to assess the heart muscle, heart valves and heart function. You may eat and take any medicines before the exam.     If you need to change your appointment, please call outpatient scheduling at [phone].

## 2018-10-04 NOTE — LETTER
Dear Keyanna Atkins MD,    Thank you for allowing me to participate in the care of Ms. Aleksey Ambrose. She presents today at the [address] [place] [address] in the [facility]. 66 y/o lady complains of recurrent episodes of abruptly awakening at night with dyspnea. Episodes are short-lived but very anxiety-provoking. Some episodic dyspnea during daylight hours. No CP and cath was normal 5/14. Prior sleep study in the remote past.      Patient Active Problem List   Diagnosis    Painful intercourse    Essential hypertension    Combined hyperlipidemia    Chronic diarrhea    History of colon resection    Osteoarthritis    Hypercholesterolemia    Hypertriglyceridemia    GERD (gastroesophageal reflux disease)    Panic attack    S/P cardiac cath    Thyroid nodule    Class 2 obesity due to excess calories without serious comorbidity in adult    PND (paroxysmal nocturnal dyspnea)         1. PND - ventricular function always normal in past. Raises question of MARCELL - will obtain sleep study.   2. Hypertension - pressures well controlled today        Sincerely yours,    Nitish Watson MD  [facility]

## 2018-10-16 RX ORDER — DICYCLOMINE HYDROCHLORIDE 10 MG/1
10 CAPSULE ORAL 4 TIMES DAILY PRN
Qty: 120 CAPSULE | Refills: 3 | Status: SHIPPED | OUTPATIENT
Start: 2018-10-16 | End: 2019-02-06 | Stop reason: SDUPTHER

## 2018-10-16 RX ORDER — PROPRANOLOL HYDROCHLORIDE 120 MG/1
120 CAPSULE, EXTENDED RELEASE ORAL DAILY
Qty: 30 CAPSULE | Refills: 3 | Status: SHIPPED | OUTPATIENT
Start: 2018-10-16 | End: 2019-02-06 | Stop reason: SDUPTHER

## 2018-10-16 RX ORDER — CANDESARTAN 32 MG/1
32 TABLET ORAL DAILY
Qty: 30 TABLET | Refills: 5 | Status: SHIPPED | OUTPATIENT
Start: 2018-10-16 | End: 2019-04-03 | Stop reason: SDUPTHER

## 2018-10-16 RX ORDER — INDAPAMIDE 2.5 MG/1
2.5 TABLET, FILM COATED ORAL DAILY
Qty: 30 TABLET | Refills: 3 | Status: SHIPPED | OUTPATIENT
Start: 2018-10-16 | End: 2019-02-06 | Stop reason: SDUPTHER

## 2018-10-16 RX ORDER — PRAVASTATIN SODIUM 10 MG
10 TABLET ORAL DAILY
Qty: 30 TABLET | Refills: 3 | Status: SHIPPED | OUTPATIENT
Start: 2018-10-16 | End: 2019-02-06 | Stop reason: SDUPTHER

## 2018-10-16 RX ORDER — ESOMEPRAZOLE MAGNESIUM 40 MG/1
40 CAPSULE, DELAYED RELEASE ORAL
Qty: 30 CAPSULE | Refills: 3 | Status: SHIPPED | OUTPATIENT
Start: 2018-10-16 | End: 2019-02-06 | Stop reason: SDUPTHER

## 2018-10-18 ENCOUNTER — TELEPHONE (OUTPATIENT)
Dept: CARDIOLOGY | Age: 73
End: 2018-10-18

## 2018-10-18 ENCOUNTER — HOSPITAL ENCOUNTER (OUTPATIENT)
Dept: NON INVASIVE DIAGNOSTICS | Age: 73
Discharge: HOME | End: 2018-10-18
Payer: MEDICARE

## 2018-10-18 DIAGNOSIS — R06.02 SOB (SHORTNESS OF BREATH): ICD-10-CM

## 2018-10-18 DIAGNOSIS — R00.2 PALPITATIONS: ICD-10-CM

## 2018-10-18 DIAGNOSIS — I10 ESSENTIAL HYPERTENSION: ICD-10-CM

## 2018-10-18 PROCEDURE — 93306 TTE W/DOPPLER COMPLETE: CPT

## 2018-11-14 ENCOUNTER — OFFICE VISIT (OUTPATIENT)
Dept: CARDIOLOGY | Age: 73
End: 2018-11-14
Payer: MEDICARE

## 2018-11-14 VITALS
WEIGHT: 215 LBS | HEIGHT: 64 IN | BODY MASS INDEX: 36.7 KG/M2 | OXYGEN SATURATION: 95 % | DIASTOLIC BLOOD PRESSURE: 72 MMHG | SYSTOLIC BLOOD PRESSURE: 118 MMHG | HEART RATE: 69 BPM

## 2018-11-14 DIAGNOSIS — G47.33 OSA (OBSTRUCTIVE SLEEP APNEA): ICD-10-CM

## 2018-11-14 DIAGNOSIS — I10 ESSENTIAL HYPERTENSION: ICD-10-CM

## 2018-11-14 DIAGNOSIS — E78.2 COMBINED HYPERLIPIDEMIA: Primary | ICD-10-CM

## 2018-11-14 PROCEDURE — 1123F ACP DISCUSS/DSCN MKR DOCD: CPT | Performed by: NURSE PRACTITIONER

## 2018-11-14 PROCEDURE — G8482 FLU IMMUNIZE ORDER/ADMIN: HCPCS | Performed by: NURSE PRACTITIONER

## 2018-11-14 PROCEDURE — 1090F PRES/ABSN URINE INCON ASSESS: CPT | Performed by: NURSE PRACTITIONER

## 2018-11-14 PROCEDURE — 4040F PNEUMOC VAC/ADMIN/RCVD: CPT | Performed by: NURSE PRACTITIONER

## 2018-11-14 PROCEDURE — 1101F PT FALLS ASSESS-DOCD LE1/YR: CPT | Performed by: NURSE PRACTITIONER

## 2018-11-14 PROCEDURE — G8427 DOCREV CUR MEDS BY ELIG CLIN: HCPCS | Performed by: NURSE PRACTITIONER

## 2018-11-14 PROCEDURE — 3017F COLORECTAL CA SCREEN DOC REV: CPT | Performed by: NURSE PRACTITIONER

## 2018-11-14 PROCEDURE — G8399 PT W/DXA RESULTS DOCUMENT: HCPCS | Performed by: NURSE PRACTITIONER

## 2018-11-14 PROCEDURE — 1036F TOBACCO NON-USER: CPT | Performed by: NURSE PRACTITIONER

## 2018-11-14 PROCEDURE — 99213 OFFICE O/P EST LOW 20 MIN: CPT | Performed by: NURSE PRACTITIONER

## 2018-11-14 PROCEDURE — G8417 CALC BMI ABV UP PARAM F/U: HCPCS | Performed by: NURSE PRACTITIONER

## 2018-11-14 NOTE — Clinical Note
Dr. Donnie Townsend, Dr. Laisha Hinojosa would like for you to review her sleep study results and treat. The report is located under the media tab. Thank you.

## 2019-02-06 ENCOUNTER — OFFICE VISIT (OUTPATIENT)
Dept: PRIMARY CARE CLINIC | Age: 74
End: 2019-02-06
Payer: MEDICARE

## 2019-02-06 VITALS
HEIGHT: 63 IN | SYSTOLIC BLOOD PRESSURE: 132 MMHG | HEART RATE: 59 BPM | TEMPERATURE: 97.4 F | OXYGEN SATURATION: 97 % | WEIGHT: 215 LBS | DIASTOLIC BLOOD PRESSURE: 80 MMHG | RESPIRATION RATE: 22 BRPM | BODY MASS INDEX: 38.09 KG/M2

## 2019-02-06 DIAGNOSIS — K58.0 IRRITABLE BOWEL SYNDROME WITH DIARRHEA: Primary | ICD-10-CM

## 2019-02-06 DIAGNOSIS — L98.9 SKIN LESION OF BACK: ICD-10-CM

## 2019-02-06 DIAGNOSIS — I10 ESSENTIAL HYPERTENSION: ICD-10-CM

## 2019-02-06 DIAGNOSIS — L98.9 CHANGING SKIN LESION: ICD-10-CM

## 2019-02-06 DIAGNOSIS — E78.00 HYPERCHOLESTEROLEMIA: ICD-10-CM

## 2019-02-06 LAB
ALBUMIN SERPL-MCNC: 4.2 G/DL (ref 3.5–5.2)
ALP BLD-CCNC: 88 U/L (ref 35–104)
ALT SERPL-CCNC: 9 U/L (ref 5–33)
ANION GAP SERPL CALCULATED.3IONS-SCNC: 14 MMOL/L (ref 7–19)
AST SERPL-CCNC: 21 U/L (ref 5–32)
BASOPHILS ABSOLUTE: 0.1 K/UL (ref 0–0.2)
BASOPHILS RELATIVE PERCENT: 0.8 % (ref 0–1)
BILIRUB SERPL-MCNC: 0.3 MG/DL (ref 0.2–1.2)
BUN BLDV-MCNC: 27 MG/DL (ref 8–23)
CALCIUM SERPL-MCNC: 9.6 MG/DL (ref 8.8–10.2)
CHLORIDE BLD-SCNC: 99 MMOL/L (ref 98–111)
CHOLESTEROL, TOTAL: 165 MG/DL (ref 160–199)
CO2: 25 MMOL/L (ref 22–29)
CREAT SERPL-MCNC: 1 MG/DL (ref 0.5–0.9)
EOSINOPHILS ABSOLUTE: 0.2 K/UL (ref 0–0.6)
EOSINOPHILS RELATIVE PERCENT: 2.3 % (ref 0–5)
GFR NON-AFRICAN AMERICAN: 54
GLUCOSE BLD-MCNC: 81 MG/DL (ref 74–109)
HCT VFR BLD CALC: 41.2 % (ref 37–47)
HDLC SERPL-MCNC: 56 MG/DL (ref 65–121)
HEMOGLOBIN: 12.4 G/DL (ref 12–16)
LDL CHOLESTEROL CALCULATED: 68 MG/DL
LYMPHOCYTES ABSOLUTE: 1 K/UL (ref 1.1–4.5)
LYMPHOCYTES RELATIVE PERCENT: 15.3 % (ref 20–40)
MCH RBC QN AUTO: 28.8 PG (ref 27–31)
MCHC RBC AUTO-ENTMCNC: 30.1 G/DL (ref 33–37)
MCV RBC AUTO: 95.8 FL (ref 81–99)
MONOCYTES ABSOLUTE: 0.5 K/UL (ref 0–0.9)
MONOCYTES RELATIVE PERCENT: 7.8 % (ref 0–10)
NEUTROPHILS ABSOLUTE: 4.7 K/UL (ref 1.5–7.5)
NEUTROPHILS RELATIVE PERCENT: 72.6 % (ref 50–65)
PDW BLD-RTO: 14.3 % (ref 11.5–14.5)
PLATELET # BLD: 255 K/UL (ref 130–400)
PMV BLD AUTO: 11.1 FL (ref 9.4–12.3)
POTASSIUM SERPL-SCNC: 5.3 MMOL/L (ref 3.5–5)
RBC # BLD: 4.3 M/UL (ref 4.2–5.4)
SODIUM BLD-SCNC: 138 MMOL/L (ref 136–145)
TOTAL PROTEIN: 7.2 G/DL (ref 6.6–8.7)
TRIGL SERPL-MCNC: 207 MG/DL (ref 0–149)
WBC # BLD: 6.5 K/UL (ref 4.8–10.8)

## 2019-02-06 PROCEDURE — 99214 OFFICE O/P EST MOD 30 MIN: CPT | Performed by: FAMILY MEDICINE

## 2019-02-06 PROCEDURE — G8399 PT W/DXA RESULTS DOCUMENT: HCPCS | Performed by: FAMILY MEDICINE

## 2019-02-06 PROCEDURE — G8482 FLU IMMUNIZE ORDER/ADMIN: HCPCS | Performed by: FAMILY MEDICINE

## 2019-02-06 PROCEDURE — 1036F TOBACCO NON-USER: CPT | Performed by: FAMILY MEDICINE

## 2019-02-06 PROCEDURE — G8427 DOCREV CUR MEDS BY ELIG CLIN: HCPCS | Performed by: FAMILY MEDICINE

## 2019-02-06 PROCEDURE — 1090F PRES/ABSN URINE INCON ASSESS: CPT | Performed by: FAMILY MEDICINE

## 2019-02-06 PROCEDURE — 3017F COLORECTAL CA SCREEN DOC REV: CPT | Performed by: FAMILY MEDICINE

## 2019-02-06 PROCEDURE — 1123F ACP DISCUSS/DSCN MKR DOCD: CPT | Performed by: FAMILY MEDICINE

## 2019-02-06 PROCEDURE — G8417 CALC BMI ABV UP PARAM F/U: HCPCS | Performed by: FAMILY MEDICINE

## 2019-02-06 PROCEDURE — 1101F PT FALLS ASSESS-DOCD LE1/YR: CPT | Performed by: FAMILY MEDICINE

## 2019-02-06 PROCEDURE — 11102 TANGNTL BX SKIN SINGLE LES: CPT | Performed by: FAMILY MEDICINE

## 2019-02-06 PROCEDURE — 4040F PNEUMOC VAC/ADMIN/RCVD: CPT | Performed by: FAMILY MEDICINE

## 2019-02-06 PROCEDURE — 36415 COLL VENOUS BLD VENIPUNCTURE: CPT | Performed by: FAMILY MEDICINE

## 2019-02-06 RX ORDER — DIPHENOXYLATE HYDROCHLORIDE AND ATROPINE SULFATE 2.5; .025 MG/1; MG/1
1 TABLET ORAL 4 TIMES DAILY PRN
Qty: 30 TABLET | Refills: 2 | Status: SHIPPED | OUTPATIENT
Start: 2019-02-06 | End: 2019-06-12 | Stop reason: SDUPTHER

## 2019-02-06 RX ORDER — PRAVASTATIN SODIUM 10 MG
TABLET ORAL
Qty: 30 TABLET | Refills: 3 | Status: SHIPPED | OUTPATIENT
Start: 2019-02-06 | End: 2019-10-02

## 2019-02-06 RX ORDER — PROPRANOLOL HYDROCHLORIDE 120 MG/1
CAPSULE, EXTENDED RELEASE ORAL
Qty: 30 CAPSULE | Refills: 3 | Status: SHIPPED | OUTPATIENT
Start: 2019-02-06 | End: 2019-10-02

## 2019-02-06 RX ORDER — DICYCLOMINE HYDROCHLORIDE 10 MG/1
10 CAPSULE ORAL 2 TIMES DAILY
Qty: 60 CAPSULE | Refills: 3 | Status: SHIPPED | OUTPATIENT
Start: 2019-02-06 | End: 2019-07-05 | Stop reason: SDUPTHER

## 2019-02-06 RX ORDER — INDAPAMIDE 2.5 MG/1
TABLET, FILM COATED ORAL
Qty: 30 TABLET | Refills: 3 | Status: SHIPPED | OUTPATIENT
Start: 2019-02-06 | End: 2019-10-02

## 2019-02-06 RX ORDER — ESOMEPRAZOLE MAGNESIUM 40 MG/1
CAPSULE, DELAYED RELEASE ORAL
Qty: 30 CAPSULE | Refills: 3 | Status: SHIPPED | OUTPATIENT
Start: 2019-02-06 | End: 2019-10-02

## 2019-02-06 ASSESSMENT — ENCOUNTER SYMPTOMS
EYE DISCHARGE: 0
NAUSEA: 0
COUGH: 0
COLOR CHANGE: 0
VOMITING: 0
BACK PAIN: 0
ABDOMINAL PAIN: 0
DIARRHEA: 0
WHEEZING: 0

## 2019-02-06 ASSESSMENT — PATIENT HEALTH QUESTIONNAIRE - PHQ9
SUM OF ALL RESPONSES TO PHQ QUESTIONS 1-9: 0
2. FEELING DOWN, DEPRESSED OR HOPELESS: 0
SUM OF ALL RESPONSES TO PHQ9 QUESTIONS 1 & 2: 0
SUM OF ALL RESPONSES TO PHQ QUESTIONS 1-9: 0
1. LITTLE INTEREST OR PLEASURE IN DOING THINGS: 0

## 2019-02-07 DIAGNOSIS — R06.00 PND (PAROXYSMAL NOCTURNAL DYSPNEA): Primary | ICD-10-CM

## 2019-02-07 DIAGNOSIS — G47.33 OBSTRUCTIVE SLEEP APNEA: ICD-10-CM

## 2019-02-07 DIAGNOSIS — R29.818 SUSPECTED SLEEP APNEA: ICD-10-CM

## 2019-02-07 DIAGNOSIS — G47.33 OBSTRUCTIVE SLEEP APNEA SYNDROME: ICD-10-CM

## 2019-02-07 DIAGNOSIS — G47.39 OTHER SLEEP APNEA: ICD-10-CM

## 2019-02-07 PROCEDURE — 99999 PR POLYSOM 6/>YRS SLEEP W/CPAP 4/> ADDL PARAM ATTND: CPT | Performed by: INTERNAL MEDICINE

## 2019-02-07 RX ORDER — PRAVASTATIN SODIUM 10 MG
TABLET ORAL
Qty: 30 TABLET | Refills: 3 | Status: SHIPPED | OUTPATIENT
Start: 2019-02-07 | End: 2019-04-29 | Stop reason: SDUPTHER

## 2019-02-07 RX ORDER — INDAPAMIDE 2.5 MG/1
TABLET, FILM COATED ORAL
Qty: 30 TABLET | Refills: 3 | Status: SHIPPED | OUTPATIENT
Start: 2019-02-07 | End: 2019-04-29 | Stop reason: SDUPTHER

## 2019-02-07 RX ORDER — ESOMEPRAZOLE MAGNESIUM 40 MG/1
CAPSULE, DELAYED RELEASE ORAL
Qty: 30 CAPSULE | Refills: 3 | Status: SHIPPED | OUTPATIENT
Start: 2019-02-07 | End: 2019-04-29 | Stop reason: SDUPTHER

## 2019-02-07 RX ORDER — PROPRANOLOL HYDROCHLORIDE 120 MG/1
CAPSULE, EXTENDED RELEASE ORAL
Qty: 30 CAPSULE | Refills: 3 | Status: SHIPPED | OUTPATIENT
Start: 2019-02-07 | End: 2019-04-29 | Stop reason: SDUPTHER

## 2019-02-14 ENCOUNTER — DOCUMENTATION (OUTPATIENT)
Dept: PHYSICAL THERAPY | Facility: HOSPITAL | Age: 74
End: 2019-02-14

## 2019-02-14 DIAGNOSIS — M54.2 NECK PAIN: Primary | ICD-10-CM

## 2019-02-14 NOTE — THERAPY DISCHARGE NOTE
Outpatient Physical Therapy Discharge Summary         Patient Name: Vanessa Kamara  : 1945  MRN: [id number]    Today's Date: 2019    Visit Dx:    ICD-10-CM ICD-9-CM   1. Neck pain M54.2 723.1       PT OP Goals     Row Name 19 0900          PT Short Term Goals    STG Date to Achieve  18  -KG     STG 1  Independent/compliant with HEP  -KG     STG 1 Progress  Met  -KG     STG 2  Tolerate 45 minute treatment session without increased pain  -KG     STG 2 Progress  Partially Met  -KG     STG 3  Demonstrate cervical ext AROM to 45 deg without increased pain  -KG     STG 3 Progress  Partially Met  -KG        Long Term Goals    LTG Date to Achieve  18  -KG     LTG 1  Subjectively report 60% improvement or greater  -KG     LTG 1 Progress  Not Met  -KG     LTG 2  Improve NDI score to 10% or less  -KG     LTG 2 Progress  Not Met  -KG     LTG 3  Demonstrate R cervical rotation to 60 deg or greater without increased pain  -KG     LTG 3 Progress  Not Met  -KG     LTG 4  Demonstrate independence in proper posture/body mechanics throughout treatment session without cuing  -KG     LTG 4 Progress  Not Met  -KG     LTG 5  Demonstrate R cervical lat flex AROM within 5 deg of L lat flex without increased pain  -KG     LTG 5 Progress  Not Met  -KG     LTG 6  Demonstrate decrease pain/TTP at bilateral sub-occipitals by 75%  -KG     LTG 6 Progress  Not Met  -KG       User Key  (r) = Recorded By, (t) = Taken By, (c) = Cosigned By    Initials Name Provider Type    Tran Ruiz, PT Physical Therapist          OP PT Discharge Summary  Date of Discharge: 19  Reason for Discharge: other (comment)(Pt did not return to PT after her appt on 18.)  Outcomes Achieved: Patient able to partially acheive established goals, Refer to plan of care for updates on goals achieved  Discharge Destination: Home with home program      Time Calculation:        Therapy Suggested Charges     Code   Minutes Charges     None                       Tran Vega, PT  2/14/2019

## 2019-03-05 RX ORDER — SERTRALINE HYDROCHLORIDE 100 MG/1
TABLET, FILM COATED ORAL
Qty: 30 TABLET | Refills: 5 | Status: SHIPPED | OUTPATIENT
Start: 2019-03-05 | End: 2019-12-02

## 2019-03-06 RX ORDER — SERTRALINE HYDROCHLORIDE 100 MG/1
TABLET, FILM COATED ORAL
Qty: 30 TABLET | Refills: 5 | Status: SHIPPED | OUTPATIENT
Start: 2019-03-06 | End: 2019-04-29 | Stop reason: SDUPTHER

## 2019-03-25 ENCOUNTER — HOSPITAL ENCOUNTER (INPATIENT)
Facility: HOSPITAL | Age: 74
LOS: 7 days | Discharge: NURSING, REHAB OR LONG-TERM CARE | End: 2019-04-01
Attending: EMERGENCY MEDICINE | Admitting: ORTHOPAEDIC SURGERY

## 2019-03-25 ENCOUNTER — APPOINTMENT (OUTPATIENT)
Dept: GENERAL RADIOLOGY | Facility: HOSPITAL | Age: 74
End: 2019-03-25

## 2019-03-25 ENCOUNTER — APPOINTMENT (OUTPATIENT)
Dept: CT IMAGING | Facility: HOSPITAL | Age: 74
End: 2019-03-25

## 2019-03-25 DIAGNOSIS — Z74.09 IMPAIRED MOBILITY: ICD-10-CM

## 2019-03-25 DIAGNOSIS — S72.401A CLOSED FRACTURE OF DISTAL END OF RIGHT FEMUR, UNSPECIFIED FRACTURE MORPHOLOGY, INITIAL ENCOUNTER (HCC): Primary | ICD-10-CM

## 2019-03-25 DIAGNOSIS — Z78.9 DECREASED ACTIVITIES OF DAILY LIVING (ADL): ICD-10-CM

## 2019-03-25 LAB
ABO GROUP BLD: NORMAL
ALBUMIN SERPL-MCNC: 3.6 G/DL (ref 3.5–5)
ALBUMIN/GLOB SERPL: 1.6 G/DL (ref 1.1–2.5)
ALP SERPL-CCNC: 86 U/L (ref 24–120)
ALT SERPL W P-5'-P-CCNC: 15 U/L (ref 0–54)
ANION GAP SERPL CALCULATED.3IONS-SCNC: 6 MMOL/L (ref 4–13)
AST SERPL-CCNC: 26 U/L (ref 7–45)
BASOPHILS # BLD AUTO: 0.07 10*3/MM3 (ref 0–0.2)
BASOPHILS NFR BLD AUTO: 0.6 % (ref 0–2)
BILIRUB SERPL-MCNC: 0.4 MG/DL (ref 0.1–1)
BLD GP AB SCN SERPL QL: NEGATIVE
BUN BLD-MCNC: 24 MG/DL (ref 5–21)
BUN/CREAT SERPL: 25 (ref 7–25)
CALCIUM SPEC-SCNC: 8.8 MG/DL (ref 8.4–10.4)
CHLORIDE SERPL-SCNC: 103 MMOL/L (ref 98–110)
CO2 SERPL-SCNC: 27 MMOL/L (ref 24–31)
CREAT BLD-MCNC: 0.96 MG/DL (ref 0.5–1.4)
DEPRECATED RDW RBC AUTO: 45.8 FL (ref 40–54)
EOSINOPHIL # BLD AUTO: 0.3 10*3/MM3 (ref 0–0.7)
EOSINOPHIL NFR BLD AUTO: 2.8 % (ref 0–4)
ERYTHROCYTE [DISTWIDTH] IN BLOOD BY AUTOMATED COUNT: 14 % (ref 12–15)
GFR SERPL CREATININE-BSD FRML MDRD: 57 ML/MIN/1.73
GLOBULIN UR ELPH-MCNC: 2.3 GM/DL
GLUCOSE BLD-MCNC: 155 MG/DL (ref 70–100)
HCT VFR BLD AUTO: 33.2 % (ref 37–47)
HGB BLD-MCNC: 10.8 G/DL (ref 12–16)
HOLD SPECIMEN: NORMAL
HOLD SPECIMEN: NORMAL
IMM GRANULOCYTES # BLD AUTO: 0.12 10*3/MM3 (ref 0–0.05)
IMM GRANULOCYTES NFR BLD AUTO: 1.1 % (ref 0–5)
INR PPP: 0.9 (ref 0.91–1.09)
LYMPHOCYTES # BLD AUTO: 1.01 10*3/MM3 (ref 0.72–4.86)
LYMPHOCYTES NFR BLD AUTO: 9.3 % (ref 15–45)
MCH RBC QN AUTO: 29.3 PG (ref 28–32)
MCHC RBC AUTO-ENTMCNC: 32.5 G/DL (ref 33–36)
MCV RBC AUTO: 90 FL (ref 82–98)
MONOCYTES # BLD AUTO: 0.72 10*3/MM3 (ref 0.19–1.3)
MONOCYTES NFR BLD AUTO: 6.6 % (ref 4–12)
NEUTROPHILS # BLD AUTO: 8.62 10*3/MM3 (ref 1.87–8.4)
NEUTROPHILS NFR BLD AUTO: 79.6 % (ref 39–78)
NRBC BLD AUTO-RTO: 0 /100 WBC (ref 0–0)
PLATELET # BLD AUTO: 276 10*3/MM3 (ref 130–400)
PMV BLD AUTO: 11.1 FL (ref 6–12)
POTASSIUM BLD-SCNC: 4.5 MMOL/L (ref 3.5–5.3)
PROT SERPL-MCNC: 5.9 G/DL (ref 6.3–8.7)
PROTHROMBIN TIME: 12.4 SECONDS (ref 11.9–14.6)
RBC # BLD AUTO: 3.69 10*6/MM3 (ref 4.2–5.4)
RH BLD: POSITIVE
SODIUM BLD-SCNC: 136 MMOL/L (ref 135–145)
T&S EXPIRATION DATE: NORMAL
WBC NRBC COR # BLD: 10.84 10*3/MM3 (ref 4.8–10.8)
WHOLE BLOOD HOLD SPECIMEN: NORMAL
WHOLE BLOOD HOLD SPECIMEN: NORMAL

## 2019-03-25 PROCEDURE — 86920 COMPATIBILITY TEST SPIN: CPT

## 2019-03-25 PROCEDURE — 70486 CT MAXILLOFACIAL W/O DYE: CPT

## 2019-03-25 PROCEDURE — 71045 X-RAY EXAM CHEST 1 VIEW: CPT

## 2019-03-25 PROCEDURE — 85025 COMPLETE CBC W/AUTO DIFF WBC: CPT | Performed by: EMERGENCY MEDICINE

## 2019-03-25 PROCEDURE — 73502 X-RAY EXAM HIP UNI 2-3 VIEWS: CPT

## 2019-03-25 PROCEDURE — 86901 BLOOD TYPING SEROLOGIC RH(D): CPT | Performed by: EMERGENCY MEDICINE

## 2019-03-25 PROCEDURE — 25010000002 PROMETHAZINE PER 50 MG: Performed by: EMERGENCY MEDICINE

## 2019-03-25 PROCEDURE — 99284 EMERGENCY DEPT VISIT MOD MDM: CPT

## 2019-03-25 PROCEDURE — 25010000002 ONDANSETRON PER 1 MG: Performed by: EMERGENCY MEDICINE

## 2019-03-25 PROCEDURE — 86901 BLOOD TYPING SEROLOGIC RH(D): CPT

## 2019-03-25 PROCEDURE — 25010000002 FENTANYL CITRATE (PF) 100 MCG/2ML SOLUTION: Performed by: EMERGENCY MEDICINE

## 2019-03-25 PROCEDURE — 73552 X-RAY EXAM OF FEMUR 2/>: CPT

## 2019-03-25 PROCEDURE — 70450 CT HEAD/BRAIN W/O DYE: CPT

## 2019-03-25 PROCEDURE — 86900 BLOOD TYPING SEROLOGIC ABO: CPT

## 2019-03-25 PROCEDURE — 73590 X-RAY EXAM OF LOWER LEG: CPT

## 2019-03-25 PROCEDURE — 73562 X-RAY EXAM OF KNEE 3: CPT

## 2019-03-25 PROCEDURE — 86900 BLOOD TYPING SEROLOGIC ABO: CPT | Performed by: EMERGENCY MEDICINE

## 2019-03-25 PROCEDURE — 72125 CT NECK SPINE W/O DYE: CPT

## 2019-03-25 PROCEDURE — 80053 COMPREHEN METABOLIC PANEL: CPT | Performed by: EMERGENCY MEDICINE

## 2019-03-25 PROCEDURE — 25010000002 HYDROMORPHONE PER 4 MG: Performed by: EMERGENCY MEDICINE

## 2019-03-25 PROCEDURE — 86850 RBC ANTIBODY SCREEN: CPT | Performed by: EMERGENCY MEDICINE

## 2019-03-25 PROCEDURE — 85610 PROTHROMBIN TIME: CPT | Performed by: EMERGENCY MEDICINE

## 2019-03-25 RX ORDER — DICYCLOMINE HYDROCHLORIDE 10 MG/1
10 CAPSULE ORAL 2 TIMES DAILY
COMMUNITY
End: 2023-01-26

## 2019-03-25 RX ORDER — PROMETHAZINE HYDROCHLORIDE 25 MG/ML
12.5 INJECTION, SOLUTION INTRAMUSCULAR; INTRAVENOUS EVERY 6 HOURS PRN
Status: DISCONTINUED | OUTPATIENT
Start: 2019-03-25 | End: 2019-04-01 | Stop reason: HOSPADM

## 2019-03-25 RX ORDER — PROMETHAZINE HYDROCHLORIDE 25 MG/ML
12.5 INJECTION, SOLUTION INTRAMUSCULAR; INTRAVENOUS ONCE
Status: COMPLETED | OUTPATIENT
Start: 2019-03-25 | End: 2019-03-25

## 2019-03-25 RX ORDER — MORPHINE SULFATE 1 MG/ML
INJECTION INTRAVENOUS CONTINUOUS
Status: DISCONTINUED | OUTPATIENT
Start: 2019-03-25 | End: 2019-03-25

## 2019-03-25 RX ORDER — INDAPAMIDE 2.5 MG/1
2.5 TABLET, FILM COATED ORAL DAILY
Status: DISCONTINUED | OUTPATIENT
Start: 2019-03-25 | End: 2019-04-01 | Stop reason: HOSPADM

## 2019-03-25 RX ORDER — SERTRALINE HYDROCHLORIDE 100 MG/1
100 TABLET, FILM COATED ORAL DAILY
COMMUNITY

## 2019-03-25 RX ORDER — SODIUM CHLORIDE 0.9 % (FLUSH) 0.9 %
3 SYRINGE (ML) INJECTION EVERY 12 HOURS SCHEDULED
Status: DISCONTINUED | OUTPATIENT
Start: 2019-03-25 | End: 2019-04-01 | Stop reason: HOSPADM

## 2019-03-25 RX ORDER — PRAVASTATIN SODIUM 10 MG
10 TABLET ORAL DAILY
COMMUNITY

## 2019-03-25 RX ORDER — ESOMEPRAZOLE MAGNESIUM 40 MG/1
40 CAPSULE, DELAYED RELEASE ORAL
COMMUNITY

## 2019-03-25 RX ORDER — FENTANYL CITRATE 50 UG/ML
100 INJECTION, SOLUTION INTRAMUSCULAR; INTRAVENOUS ONCE
Status: COMPLETED | OUTPATIENT
Start: 2019-03-25 | End: 2019-03-25

## 2019-03-25 RX ORDER — SODIUM CHLORIDE 0.9 % (FLUSH) 0.9 %
3-10 SYRINGE (ML) INJECTION AS NEEDED
Status: DISCONTINUED | OUTPATIENT
Start: 2019-03-25 | End: 2019-04-01 | Stop reason: HOSPADM

## 2019-03-25 RX ORDER — SERTRALINE HYDROCHLORIDE 100 MG/1
100 TABLET, FILM COATED ORAL DAILY
Status: DISCONTINUED | OUTPATIENT
Start: 2019-03-25 | End: 2019-04-01 | Stop reason: HOSPADM

## 2019-03-25 RX ORDER — LEVOTHYROXINE SODIUM 0.12 MG/1
62.5 TABLET ORAL DAILY
COMMUNITY

## 2019-03-25 RX ORDER — PROMETHAZINE HYDROCHLORIDE 25 MG/1
12.5 TABLET ORAL EVERY 6 HOURS PRN
Status: DISCONTINUED | OUTPATIENT
Start: 2019-03-25 | End: 2019-04-01 | Stop reason: HOSPADM

## 2019-03-25 RX ORDER — PANTOPRAZOLE SODIUM 40 MG/1
40 TABLET, DELAYED RELEASE ORAL
Status: DISCONTINUED | OUTPATIENT
Start: 2019-03-25 | End: 2019-04-01 | Stop reason: HOSPADM

## 2019-03-25 RX ORDER — HYDROMORPHONE HYDROCHLORIDE 1 MG/ML
0.5 INJECTION, SOLUTION INTRAMUSCULAR; INTRAVENOUS; SUBCUTANEOUS ONCE
Status: COMPLETED | OUTPATIENT
Start: 2019-03-25 | End: 2019-03-25

## 2019-03-25 RX ORDER — NALOXONE HCL 0.4 MG/ML
0.1 VIAL (ML) INJECTION
Status: DISCONTINUED | OUTPATIENT
Start: 2019-03-25 | End: 2019-03-25

## 2019-03-25 RX ORDER — CANDESARTAN CILEXETIL AND HYDROCHLOROTHIAZIDE 32; 12.5 MG/1; MG/1
1 TABLET ORAL DAILY
COMMUNITY
End: 2023-01-26

## 2019-03-25 RX ORDER — DICYCLOMINE HYDROCHLORIDE 10 MG/1
10 CAPSULE ORAL DAILY
Status: DISCONTINUED | OUTPATIENT
Start: 2019-03-25 | End: 2019-04-01 | Stop reason: HOSPADM

## 2019-03-25 RX ORDER — PROMETHAZINE HYDROCHLORIDE 12.5 MG/1
12.5 SUPPOSITORY RECTAL EVERY 6 HOURS PRN
Status: DISCONTINUED | OUTPATIENT
Start: 2019-03-25 | End: 2019-04-01 | Stop reason: HOSPADM

## 2019-03-25 RX ORDER — ONDANSETRON 2 MG/ML
4 INJECTION INTRAMUSCULAR; INTRAVENOUS ONCE
Status: COMPLETED | OUTPATIENT
Start: 2019-03-25 | End: 2019-03-25

## 2019-03-25 RX ORDER — PROPRANOLOL HYDROCHLORIDE 40 MG/1
40 TABLET ORAL DAILY
Status: ON HOLD | COMMUNITY
End: 2019-03-26 | Stop reason: DRUGHIGH

## 2019-03-25 RX ORDER — PROPRANOLOL HYDROCHLORIDE 40 MG/1
40 TABLET ORAL DAILY
Status: DISCONTINUED | OUTPATIENT
Start: 2019-03-25 | End: 2019-04-01 | Stop reason: HOSPADM

## 2019-03-25 RX ORDER — ROSUVASTATIN CALCIUM 10 MG/1
5 TABLET, COATED ORAL NIGHTLY
Status: DISCONTINUED | OUTPATIENT
Start: 2019-03-25 | End: 2019-04-01 | Stop reason: HOSPADM

## 2019-03-25 RX ORDER — ASPIRIN 81 MG/1
81 TABLET, CHEWABLE ORAL DAILY
COMMUNITY

## 2019-03-25 RX ORDER — MELATONIN
2000 DAILY
COMMUNITY

## 2019-03-25 RX ORDER — NALOXONE HCL 0.4 MG/ML
0.1 VIAL (ML) INJECTION
Status: DISCONTINUED | OUTPATIENT
Start: 2019-03-25 | End: 2019-04-01 | Stop reason: HOSPADM

## 2019-03-25 RX ORDER — INDAPAMIDE 2.5 MG/1
2.5 TABLET, FILM COATED ORAL DAILY
COMMUNITY

## 2019-03-25 RX ORDER — LEVOTHYROXINE SODIUM 0.12 MG/1
125 TABLET ORAL
Status: DISCONTINUED | OUTPATIENT
Start: 2019-03-26 | End: 2019-04-01 | Stop reason: HOSPADM

## 2019-03-25 RX ORDER — PRAVASTATIN SODIUM 10 MG
10 TABLET ORAL NIGHTLY
Status: DISCONTINUED | OUTPATIENT
Start: 2019-03-25 | End: 2019-03-25 | Stop reason: SDUPTHER

## 2019-03-25 RX ADMIN — SODIUM CHLORIDE, PRESERVATIVE FREE 3 ML: 5 INJECTION INTRAVENOUS at 22:14

## 2019-03-25 RX ADMIN — FENTANYL CITRATE 100 MCG: 50 INJECTION INTRAMUSCULAR; INTRAVENOUS at 12:38

## 2019-03-25 RX ADMIN — PROMETHAZINE HYDROCHLORIDE 12.5 MG: 25 INJECTION INTRAMUSCULAR; INTRAVENOUS at 13:14

## 2019-03-25 RX ADMIN — HYDROMORPHONE HYDROCHLORIDE: 10 INJECTION INTRAMUSCULAR; INTRAVENOUS; SUBCUTANEOUS at 17:56

## 2019-03-25 RX ADMIN — ONDANSETRON HYDROCHLORIDE 4 MG: 2 SOLUTION INTRAMUSCULAR; INTRAVENOUS at 12:38

## 2019-03-25 RX ADMIN — HYDROMORPHONE HYDROCHLORIDE 0.5 MG: 1 INJECTION, SOLUTION INTRAMUSCULAR; INTRAVENOUS; SUBCUTANEOUS at 14:28

## 2019-03-25 RX ADMIN — ROSUVASTATIN CALCIUM 5 MG: 10 TABLET, FILM COATED ORAL at 22:14

## 2019-03-25 RX ADMIN — HYDROMORPHONE HYDROCHLORIDE 0.5 MG: 1 INJECTION, SOLUTION INTRAMUSCULAR; INTRAVENOUS; SUBCUTANEOUS at 13:43

## 2019-03-26 ENCOUNTER — APPOINTMENT (OUTPATIENT)
Dept: GENERAL RADIOLOGY | Facility: HOSPITAL | Age: 74
End: 2019-03-26

## 2019-03-26 ENCOUNTER — ANESTHESIA EVENT (OUTPATIENT)
Dept: PERIOP | Facility: HOSPITAL | Age: 74
End: 2019-03-26

## 2019-03-26 ENCOUNTER — ANESTHESIA (OUTPATIENT)
Dept: PERIOP | Facility: HOSPITAL | Age: 74
End: 2019-03-26

## 2019-03-26 PROCEDURE — 36430 TRANSFUSION BLD/BLD COMPNT: CPT

## 2019-03-26 PROCEDURE — 73552 X-RAY EXAM OF FEMUR 2/>: CPT

## 2019-03-26 PROCEDURE — 25010000002 METOCLOPRAMIDE PER 10 MG: Performed by: ANESTHESIOLOGY

## 2019-03-26 PROCEDURE — C1713 ANCHOR/SCREW BN/BN,TIS/BN: HCPCS | Performed by: ORTHOPAEDIC SURGERY

## 2019-03-26 PROCEDURE — 0QSB04Z REPOSITION RIGHT LOWER FEMUR WITH INTERNAL FIXATION DEVICE, OPEN APPROACH: ICD-10-PCS | Performed by: ORTHOPAEDIC SURGERY

## 2019-03-26 PROCEDURE — 76000 FLUOROSCOPY <1 HR PHYS/QHP: CPT

## 2019-03-26 PROCEDURE — 25010000003 CEFAZOLIN PER 500 MG: Performed by: ORTHOPAEDIC SURGERY

## 2019-03-26 PROCEDURE — C1769 GUIDE WIRE: HCPCS | Performed by: ORTHOPAEDIC SURGERY

## 2019-03-26 PROCEDURE — 25010000002 ROPIVACAINE PER 1 MG: Performed by: ANESTHESIOLOGY

## 2019-03-26 PROCEDURE — 25010000002 ONDANSETRON PER 1 MG: Performed by: ANESTHESIOLOGY

## 2019-03-26 PROCEDURE — 25010000002 DEXAMETHASONE PER 1 MG: Performed by: ANESTHESIOLOGY

## 2019-03-26 PROCEDURE — 25010000002 MIDAZOLAM PER 1 MG: Performed by: ANESTHESIOLOGY

## 2019-03-26 PROCEDURE — 94799 UNLISTED PULMONARY SVC/PX: CPT

## 2019-03-26 PROCEDURE — 86900 BLOOD TYPING SEROLOGIC ABO: CPT

## 2019-03-26 PROCEDURE — 25010000002 FENTANYL CITRATE (PF) 250 MCG/5ML SOLUTION: Performed by: NURSE ANESTHETIST, CERTIFIED REGISTERED

## 2019-03-26 PROCEDURE — P9016 RBC LEUKOCYTES REDUCED: HCPCS

## 2019-03-26 PROCEDURE — 25010000002 PROPOFOL 10 MG/ML EMULSION: Performed by: NURSE ANESTHETIST, CERTIFIED REGISTERED

## 2019-03-26 DEVICE — SCRW CORT S/TAP 4.5X34MM: Type: IMPLANTABLE DEVICE | Status: FUNCTIONAL

## 2019-03-26 DEVICE — IMPLANTABLE DEVICE: Type: IMPLANTABLE DEVICE | Status: FUNCTIONAL

## 2019-03-26 DEVICE — SCRW CANN VA LK 5X80MM: Type: IMPLANTABLE DEVICE | Status: FUNCTIONAL

## 2019-03-26 DEVICE — SCRW VA LK ST STRDRV 5X34MM: Type: IMPLANTABLE DEVICE | Status: FUNCTIONAL

## 2019-03-26 DEVICE — SCRW CANN VA LK 5X65MM: Type: IMPLANTABLE DEVICE | Status: FUNCTIONAL

## 2019-03-26 RX ORDER — OXYCODONE AND ACETAMINOPHEN 7.5; 325 MG/1; MG/1
1 TABLET ORAL EVERY 6 HOURS PRN
Status: DISCONTINUED | OUTPATIENT
Start: 2019-03-26 | End: 2019-04-01 | Stop reason: HOSPADM

## 2019-03-26 RX ORDER — NALOXONE HCL 0.4 MG/ML
0.04 VIAL (ML) INJECTION AS NEEDED
Status: DISCONTINUED | OUTPATIENT
Start: 2019-03-26 | End: 2019-03-26 | Stop reason: HOSPADM

## 2019-03-26 RX ORDER — MORPHINE SULFATE 2 MG/ML
2 INJECTION, SOLUTION INTRAMUSCULAR; INTRAVENOUS
Status: DISCONTINUED | OUTPATIENT
Start: 2019-03-26 | End: 2019-03-26 | Stop reason: HOSPADM

## 2019-03-26 RX ORDER — VECURONIUM BROMIDE 1 MG/ML
INJECTION, POWDER, LYOPHILIZED, FOR SOLUTION INTRAVENOUS AS NEEDED
Status: DISCONTINUED | OUTPATIENT
Start: 2019-03-26 | End: 2019-03-26 | Stop reason: SURG

## 2019-03-26 RX ORDER — MIDAZOLAM HYDROCHLORIDE 1 MG/ML
1 INJECTION INTRAMUSCULAR; INTRAVENOUS
Status: DISCONTINUED | OUTPATIENT
Start: 2019-03-26 | End: 2019-03-26 | Stop reason: HOSPADM

## 2019-03-26 RX ORDER — MIDAZOLAM HYDROCHLORIDE 1 MG/ML
2 INJECTION INTRAMUSCULAR; INTRAVENOUS
Status: DISCONTINUED | OUTPATIENT
Start: 2019-03-26 | End: 2019-03-26 | Stop reason: HOSPADM

## 2019-03-26 RX ORDER — SODIUM CHLORIDE, SODIUM LACTATE, POTASSIUM CHLORIDE, CALCIUM CHLORIDE 600; 310; 30; 20 MG/100ML; MG/100ML; MG/100ML; MG/100ML
1000 INJECTION, SOLUTION INTRAVENOUS CONTINUOUS
Status: DISCONTINUED | OUTPATIENT
Start: 2019-03-26 | End: 2019-03-26

## 2019-03-26 RX ORDER — ONDANSETRON 2 MG/ML
4 INJECTION INTRAMUSCULAR; INTRAVENOUS AS NEEDED
Status: DISCONTINUED | OUTPATIENT
Start: 2019-03-26 | End: 2019-03-26 | Stop reason: HOSPADM

## 2019-03-26 RX ORDER — DEXAMETHASONE SODIUM PHOSPHATE 4 MG/ML
4 INJECTION, SOLUTION INTRA-ARTICULAR; INTRALESIONAL; INTRAMUSCULAR; INTRAVENOUS; SOFT TISSUE ONCE AS NEEDED
Status: COMPLETED | OUTPATIENT
Start: 2019-03-26 | End: 2019-03-26

## 2019-03-26 RX ORDER — FENTANYL CITRATE 50 UG/ML
25 INJECTION, SOLUTION INTRAMUSCULAR; INTRAVENOUS
Status: DISCONTINUED | OUTPATIENT
Start: 2019-03-26 | End: 2019-03-26 | Stop reason: HOSPADM

## 2019-03-26 RX ORDER — METOCLOPRAMIDE HYDROCHLORIDE 5 MG/ML
5 INJECTION INTRAMUSCULAR; INTRAVENOUS
Status: COMPLETED | OUTPATIENT
Start: 2019-03-26 | End: 2019-03-26

## 2019-03-26 RX ORDER — SODIUM CHLORIDE 9 MG/ML
INJECTION, SOLUTION INTRAVENOUS CONTINUOUS PRN
Status: COMPLETED | OUTPATIENT
Start: 2019-03-26 | End: 2019-03-26

## 2019-03-26 RX ORDER — FENTANYL CITRATE 50 UG/ML
INJECTION, SOLUTION INTRAMUSCULAR; INTRAVENOUS AS NEEDED
Status: DISCONTINUED | OUTPATIENT
Start: 2019-03-26 | End: 2019-03-26 | Stop reason: SURG

## 2019-03-26 RX ORDER — SODIUM CHLORIDE, SODIUM LACTATE, POTASSIUM CHLORIDE, CALCIUM CHLORIDE 600; 310; 30; 20 MG/100ML; MG/100ML; MG/100ML; MG/100ML
75 INJECTION, SOLUTION INTRAVENOUS CONTINUOUS
Status: DISCONTINUED | OUTPATIENT
Start: 2019-03-26 | End: 2019-03-27

## 2019-03-26 RX ORDER — SODIUM CHLORIDE 0.9 % (FLUSH) 0.9 %
3 SYRINGE (ML) INJECTION AS NEEDED
Status: DISCONTINUED | OUTPATIENT
Start: 2019-03-26 | End: 2019-03-26 | Stop reason: HOSPADM

## 2019-03-26 RX ORDER — SODIUM CHLORIDE, SODIUM LACTATE, POTASSIUM CHLORIDE, CALCIUM CHLORIDE 600; 310; 30; 20 MG/100ML; MG/100ML; MG/100ML; MG/100ML
9 INJECTION, SOLUTION INTRAVENOUS CONTINUOUS
Status: DISCONTINUED | OUTPATIENT
Start: 2019-03-26 | End: 2019-03-26

## 2019-03-26 RX ORDER — HYDRALAZINE HYDROCHLORIDE 20 MG/ML
5 INJECTION INTRAMUSCULAR; INTRAVENOUS
Status: DISCONTINUED | OUTPATIENT
Start: 2019-03-26 | End: 2019-03-26 | Stop reason: HOSPADM

## 2019-03-26 RX ORDER — SODIUM CHLORIDE 0.9 % (FLUSH) 0.9 %
1-10 SYRINGE (ML) INJECTION AS NEEDED
Status: DISCONTINUED | OUTPATIENT
Start: 2019-03-26 | End: 2019-03-26 | Stop reason: HOSPADM

## 2019-03-26 RX ORDER — DEXTROSE MONOHYDRATE 25 G/50ML
12.5 INJECTION, SOLUTION INTRAVENOUS AS NEEDED
Status: DISCONTINUED | OUTPATIENT
Start: 2019-03-26 | End: 2019-03-26 | Stop reason: HOSPADM

## 2019-03-26 RX ORDER — DIPHENOXYLATE HYDROCHLORIDE AND ATROPINE SULFATE 2.5; .025 MG/1; MG/1
1 TABLET ORAL 4 TIMES DAILY PRN
COMMUNITY

## 2019-03-26 RX ORDER — FENTANYL CITRATE 50 UG/ML
25 INJECTION, SOLUTION INTRAMUSCULAR; INTRAVENOUS AS NEEDED
Status: DISCONTINUED | OUTPATIENT
Start: 2019-03-26 | End: 2019-03-26 | Stop reason: HOSPADM

## 2019-03-26 RX ORDER — LABETALOL HYDROCHLORIDE 5 MG/ML
5 INJECTION, SOLUTION INTRAVENOUS
Status: DISCONTINUED | OUTPATIENT
Start: 2019-03-26 | End: 2019-03-26 | Stop reason: HOSPADM

## 2019-03-26 RX ORDER — FLUMAZENIL 0.1 MG/ML
0.2 INJECTION INTRAVENOUS AS NEEDED
Status: DISCONTINUED | OUTPATIENT
Start: 2019-03-26 | End: 2019-03-26 | Stop reason: HOSPADM

## 2019-03-26 RX ORDER — LIDOCAINE HYDROCHLORIDE 20 MG/ML
INJECTION, SOLUTION INFILTRATION; PERINEURAL AS NEEDED
Status: DISCONTINUED | OUTPATIENT
Start: 2019-03-26 | End: 2019-03-26 | Stop reason: SURG

## 2019-03-26 RX ORDER — ROPIVACAINE HYDROCHLORIDE 5 MG/ML
INJECTION, SOLUTION EPIDURAL; INFILTRATION; PERINEURAL
Status: COMPLETED | OUTPATIENT
Start: 2019-03-26 | End: 2019-03-26

## 2019-03-26 RX ORDER — PROPRANOLOL HYDROCHLORIDE 120 MG/1
120 CAPSULE, EXTENDED RELEASE ORAL DAILY
COMMUNITY
End: 2023-01-26

## 2019-03-26 RX ORDER — OXYCODONE AND ACETAMINOPHEN 10; 325 MG/1; MG/1
1 TABLET ORAL ONCE AS NEEDED
Status: DISCONTINUED | OUTPATIENT
Start: 2019-03-26 | End: 2019-03-26 | Stop reason: HOSPADM

## 2019-03-26 RX ORDER — IPRATROPIUM BROMIDE AND ALBUTEROL SULFATE 2.5; .5 MG/3ML; MG/3ML
3 SOLUTION RESPIRATORY (INHALATION) ONCE AS NEEDED
Status: DISCONTINUED | OUTPATIENT
Start: 2019-03-26 | End: 2019-03-26 | Stop reason: HOSPADM

## 2019-03-26 RX ORDER — PROPOFOL 10 MG/ML
VIAL (ML) INTRAVENOUS AS NEEDED
Status: DISCONTINUED | OUTPATIENT
Start: 2019-03-26 | End: 2019-03-26 | Stop reason: SURG

## 2019-03-26 RX ORDER — MEPERIDINE HYDROCHLORIDE 25 MG/ML
12.5 INJECTION INTRAMUSCULAR; INTRAVENOUS; SUBCUTANEOUS
Status: DISCONTINUED | OUTPATIENT
Start: 2019-03-26 | End: 2019-03-26 | Stop reason: HOSPADM

## 2019-03-26 RX ORDER — FAMOTIDINE 10 MG/ML
20 INJECTION, SOLUTION INTRAVENOUS
Status: DISCONTINUED | OUTPATIENT
Start: 2019-03-26 | End: 2019-03-26 | Stop reason: HOSPADM

## 2019-03-26 RX ADMIN — SODIUM CHLORIDE, PRESERVATIVE FREE 3 ML: 5 INJECTION INTRAVENOUS at 10:14

## 2019-03-26 RX ADMIN — CEFAZOLIN 2 G: 1 INJECTION, POWDER, FOR SOLUTION INTRAVENOUS at 10:19

## 2019-03-26 RX ADMIN — SODIUM CHLORIDE, POTASSIUM CHLORIDE, SODIUM LACTATE AND CALCIUM CHLORIDE 75 ML/HR: 600; 310; 30; 20 INJECTION, SOLUTION INTRAVENOUS at 17:20

## 2019-03-26 RX ADMIN — SODIUM CHLORIDE, POTASSIUM CHLORIDE, SODIUM LACTATE AND CALCIUM CHLORIDE 1000 ML: 600; 310; 30; 20 INJECTION, SOLUTION INTRAVENOUS at 13:47

## 2019-03-26 RX ADMIN — ONDANSETRON HYDROCHLORIDE 4 MG: 2 SOLUTION INTRAMUSCULAR; INTRAVENOUS at 13:18

## 2019-03-26 RX ADMIN — PANTOPRAZOLE SODIUM 40 MG: 40 TABLET, DELAYED RELEASE ORAL at 17:19

## 2019-03-26 RX ADMIN — METOCLOPRAMIDE 5 MG: 5 INJECTION, SOLUTION INTRAMUSCULAR; INTRAVENOUS at 13:27

## 2019-03-26 RX ADMIN — PROPOFOL 100 MG: 10 INJECTION, EMULSION INTRAVENOUS at 10:22

## 2019-03-26 RX ADMIN — ROSUVASTATIN CALCIUM 5 MG: 10 TABLET, FILM COATED ORAL at 21:16

## 2019-03-26 RX ADMIN — DEXAMETHASONE SODIUM PHOSPHATE 4 MG: 4 INJECTION, SOLUTION INTRAMUSCULAR; INTRAVENOUS at 09:35

## 2019-03-26 RX ADMIN — ROPIVACAINE HYDROCHLORIDE 20 ML: 5 INJECTION, SOLUTION EPIDURAL; INFILTRATION; PERINEURAL at 09:34

## 2019-03-26 RX ADMIN — SODIUM CHLORIDE, POTASSIUM CHLORIDE, SODIUM LACTATE AND CALCIUM CHLORIDE 1000 ML: 600; 310; 30; 20 INJECTION, SOLUTION INTRAVENOUS at 09:19

## 2019-03-26 RX ADMIN — SODIUM CHLORIDE, POTASSIUM CHLORIDE, SODIUM LACTATE AND CALCIUM CHLORIDE: 600; 310; 30; 20 INJECTION, SOLUTION INTRAVENOUS at 10:19

## 2019-03-26 RX ADMIN — EPHEDRINE SULFATE 10 MG: 50 INJECTION INTRAMUSCULAR; INTRAVENOUS; SUBCUTANEOUS at 10:27

## 2019-03-26 RX ADMIN — METOCLOPRAMIDE 5 MG: 5 INJECTION, SOLUTION INTRAMUSCULAR; INTRAVENOUS at 13:41

## 2019-03-26 RX ADMIN — FAMOTIDINE 20 MG: 10 INJECTION, SOLUTION INTRAVENOUS at 09:29

## 2019-03-26 RX ADMIN — LIDOCAINE HYDROCHLORIDE 100 MG: 20 INJECTION, SOLUTION INFILTRATION; PERINEURAL at 10:22

## 2019-03-26 RX ADMIN — VECURONIUM BROMIDE 10 MG: 1 INJECTION, POWDER, LYOPHILIZED, FOR SOLUTION INTRAVENOUS at 10:22

## 2019-03-26 RX ADMIN — MIDAZOLAM 2 MG: 1 INJECTION INTRAMUSCULAR; INTRAVENOUS at 09:32

## 2019-03-26 RX ADMIN — FENTANYL CITRATE 250 MCG: 50 INJECTION INTRAMUSCULAR; INTRAVENOUS at 10:22

## 2019-03-26 NOTE — ANESTHESIA POSTPROCEDURE EVALUATION
"Patient: Vanessa Kamara    Procedure Summary     Date:  03/26/19 Room / Location:  [facility] OR  /  [facility] OR    Anesthesia Start:  1019 Anesthesia Stop:  1248    Procedure:  OPEN REDUCTION AND INTERNAL FIXATION-FEMUR (Right Thigh) Diagnosis:  (DISTAL FEMUR FRACTURE)    Surgeon:  Tu Paredes MD Provider:  Sebastian Costa CRNA    Anesthesia Type:  general with block ASA Status:  2          Anesthesia Type: general with block  Last vitals  BP   114/43 (03/26/19 0949)   Temp   98.4 °F (36.9 °C) (03/26/19 0745)   Pulse   62 (03/26/19 0949)   Resp   18 (03/26/19 0949)     SpO2   97 % (03/26/19 0949)     Post Anesthesia Care and Evaluation    Patient location during evaluation: PACU  Patient participation: complete - patient participated  Level of consciousness: awake and alert  Pain management: adequate  Airway patency: patent  Anesthetic complications: No anesthetic complications    Cardiovascular status: acceptable  Respiratory status: acceptable  Hydration status: acceptable    Comments: Blood pressure 114/43, pulse 62, temperature 98.4 °F (36.9 °C), temperature source Oral, resp. rate 18, height 162.6 cm (64\"), weight 90.7 kg (200 lb), SpO2 97 %.    Pt discharged from PACU based on aguilar score >8      "

## 2019-03-26 NOTE — ANESTHESIA PREPROCEDURE EVALUATION
Anesthesia Evaluation     Patient summary reviewed   no history of anesthetic complications:  NPO Solid Status: > 8 hours             Airway   Mallampati: II  TM distance: >3 FB  Neck ROM: full  Dental      Pulmonary    (-) COPD, asthma, sleep apnea, not a smoker  Cardiovascular   Exercise tolerance: good (4-7 METS)    ECG reviewed    (+) hypertension, hyperlipidemia,   (-) pacemaker, past MI, angina, cardiac stents      Neuro/Psych  (-) seizures, TIA, CVA  GI/Hepatic/Renal/Endo    (+) obesity,  GERD,    (-) liver disease, no renal disease, diabetes    Musculoskeletal     Abdominal    Substance History      OB/GYN          Other                        Anesthesia Plan    ASA 2     general with block     intravenous induction   Anesthetic plan, all risks, benefits, and alternatives have been provided, discussed and informed consent has been obtained with: patient.

## 2019-03-26 NOTE — ANESTHESIA PROCEDURE NOTES
Peripheral Block      Patient reassessed immediately prior to procedure    Patient location during procedure: holding area  Start time: 3/26/2019 9:33 AM  Stop time: 3/26/2019 9:34 AM  Reason for block: procedure for pain, at surgeon's request, post-op pain management and Requested by Dr. mendosa  Preanesthetic Checklist  Completed: patient identified, site marked, surgical consent, pre-op evaluation, timeout performed, IV checked, risks and benefits discussed and monitors and equipment checked  Prep:  Pt Position: supine  Prep: ChloraPrep  Patient monitoring: blood pressure monitoring, continuous pulse oximetry and EKG  Procedure  Sedation:yes    Guidance:ultrasound guided and femoral artery identified in adductor canal and local anesthetic seen surrounding artery  ULTRASOUND INTERPRETATION. Using ultrasound guidance a 20 G gauge needle was placed in close proximity to the nerve, at which point, under ultrasound guidance anesthetic was injected in the area of the nerve and spread of the anesthesia was seen on ultrasound in close proximity thereto.  There were no abnormalities seen on ultrasound; a digital image was taken; and the patient tolerated the procedure with no complications. Ultrasound documentation: printer broken.    Laterality:right  Block Type:adductor canal block  Injection Technique:single-shot  Needle Type:echogenic    Medications Used: ropivacaine (NAROPIN) injection 0.5 %, 20 mL  Med admintered at 3/26/2019 9:34 AM  Post Assessment  Injection Assessment: negative aspiration for heme, no paresthesia on injection and incremental injection  Patient Tolerance:comfortable throughout block  Complications:no

## 2019-03-26 NOTE — ANESTHESIA PROCEDURE NOTES
Airway  Urgency: elective    Airway not difficult    General Information and Staff    Patient location during procedure: OR  CRNA: Sebastian Costa CRNA    Indications and Patient Condition  Indications for airway management: airway protection    Preoxygenated: yes  MILS maintained throughout  Mask difficulty assessment: 1 - vent by mask    Final Airway Details  Final airway type: endotracheal airway      Successful airway: ETT  Cuffed: yes   Successful intubation technique: direct laryngoscopy  Endotracheal tube insertion site: oral  Blade: Orourke  Blade size: 2  ETT size (mm): 7.5  Cormack-Lehane Classification: grade I - full view of glottis  Placement verified by: chest auscultation and capnometry   Cuff volume (mL): 5  Measured from: lips  ETT to lips (cm): 20  Number of attempts at approach: 1    Additional Comments  Exaggerated sniffing position

## 2019-03-27 PROCEDURE — 97167 OT EVAL HIGH COMPLEX 60 MIN: CPT | Performed by: OCCUPATIONAL THERAPIST

## 2019-03-27 PROCEDURE — 94799 UNLISTED PULMONARY SVC/PX: CPT

## 2019-03-27 PROCEDURE — 63710000001 PROMETHAZINE PER 25 MG: Performed by: PHYSICIAN ASSISTANT

## 2019-03-27 RX ADMIN — OXYCODONE HYDROCHLORIDE AND ACETAMINOPHEN 1 TABLET: 7.5; 325 TABLET ORAL at 02:45

## 2019-03-27 RX ADMIN — PROMETHAZINE HYDROCHLORIDE 12.5 MG: 25 TABLET ORAL at 09:00

## 2019-03-27 RX ADMIN — PANTOPRAZOLE SODIUM 40 MG: 40 TABLET, DELAYED RELEASE ORAL at 18:13

## 2019-03-27 RX ADMIN — ROSUVASTATIN CALCIUM 5 MG: 10 TABLET, FILM COATED ORAL at 20:08

## 2019-03-27 RX ADMIN — DICYCLOMINE HYDROCHLORIDE 10 MG: 10 CAPSULE ORAL at 09:01

## 2019-03-27 RX ADMIN — OXYCODONE HYDROCHLORIDE AND ACETAMINOPHEN 1 TABLET: 7.5; 325 TABLET ORAL at 09:01

## 2019-03-27 RX ADMIN — SODIUM CHLORIDE, PRESERVATIVE FREE 3 ML: 5 INJECTION INTRAVENOUS at 09:02

## 2019-03-27 RX ADMIN — PROMETHAZINE HYDROCHLORIDE 12.5 MG: 25 TABLET ORAL at 02:45

## 2019-03-27 RX ADMIN — OXYCODONE HYDROCHLORIDE AND ACETAMINOPHEN 1 TABLET: 7.5; 325 TABLET ORAL at 21:21

## 2019-03-27 RX ADMIN — SODIUM CHLORIDE, PRESERVATIVE FREE 3 ML: 5 INJECTION INTRAVENOUS at 20:09

## 2019-03-27 RX ADMIN — PROPRANOLOL HYDROCHLORIDE 40 MG: 40 TABLET ORAL at 09:01

## 2019-03-27 RX ADMIN — PANTOPRAZOLE SODIUM 40 MG: 40 TABLET, DELAYED RELEASE ORAL at 05:41

## 2019-03-27 RX ADMIN — INDAPAMIDE 2.5 MG: 2.5 TABLET, FILM COATED ORAL at 09:01

## 2019-03-27 RX ADMIN — LEVOTHYROXINE SODIUM 125 MCG: 125 TABLET ORAL at 05:40

## 2019-03-27 RX ADMIN — SERTRALINE 100 MG: 100 TABLET, FILM COATED ORAL at 09:01

## 2019-03-27 RX ADMIN — OXYCODONE HYDROCHLORIDE AND ACETAMINOPHEN 1 TABLET: 7.5; 325 TABLET ORAL at 15:06

## 2019-03-28 LAB
HCT VFR BLD AUTO: 26.5 % (ref 37–47)
HGB BLD-MCNC: 8.9 G/DL (ref 12–16)

## 2019-03-28 PROCEDURE — 85018 HEMOGLOBIN: CPT | Performed by: ORTHOPAEDIC SURGERY

## 2019-03-28 PROCEDURE — 94799 UNLISTED PULMONARY SVC/PX: CPT

## 2019-03-28 PROCEDURE — 97161 PT EVAL LOW COMPLEX 20 MIN: CPT

## 2019-03-28 PROCEDURE — 85014 HEMATOCRIT: CPT | Performed by: ORTHOPAEDIC SURGERY

## 2019-03-28 PROCEDURE — 97535 SELF CARE MNGMENT TRAINING: CPT

## 2019-03-28 PROCEDURE — 97530 THERAPEUTIC ACTIVITIES: CPT

## 2019-03-28 RX ADMIN — PANTOPRAZOLE SODIUM 40 MG: 40 TABLET, DELAYED RELEASE ORAL at 18:15

## 2019-03-28 RX ADMIN — INDAPAMIDE 2.5 MG: 2.5 TABLET, FILM COATED ORAL at 09:28

## 2019-03-28 RX ADMIN — LEVOTHYROXINE SODIUM 125 MCG: 125 TABLET ORAL at 05:34

## 2019-03-28 RX ADMIN — SERTRALINE 100 MG: 100 TABLET, FILM COATED ORAL at 09:28

## 2019-03-28 RX ADMIN — OXYCODONE HYDROCHLORIDE AND ACETAMINOPHEN 1 TABLET: 7.5; 325 TABLET ORAL at 03:24

## 2019-03-28 RX ADMIN — PANTOPRAZOLE SODIUM 40 MG: 40 TABLET, DELAYED RELEASE ORAL at 05:34

## 2019-03-28 RX ADMIN — OXYCODONE HYDROCHLORIDE AND ACETAMINOPHEN 1 TABLET: 7.5; 325 TABLET ORAL at 16:56

## 2019-03-28 RX ADMIN — OXYCODONE HYDROCHLORIDE AND ACETAMINOPHEN 1 TABLET: 7.5; 325 TABLET ORAL at 09:28

## 2019-03-28 RX ADMIN — OXYCODONE HYDROCHLORIDE AND ACETAMINOPHEN 1 TABLET: 7.5; 325 TABLET ORAL at 23:43

## 2019-03-28 RX ADMIN — PROPRANOLOL HYDROCHLORIDE 40 MG: 40 TABLET ORAL at 09:28

## 2019-03-28 RX ADMIN — DICYCLOMINE HYDROCHLORIDE 10 MG: 10 CAPSULE ORAL at 09:28

## 2019-03-28 RX ADMIN — ROSUVASTATIN CALCIUM 5 MG: 10 TABLET, FILM COATED ORAL at 20:53

## 2019-03-29 LAB
ABO + RH BLD: NORMAL
ABO + RH BLD: NORMAL
BH BB BLOOD EXPIRATION DATE: NORMAL
BH BB BLOOD EXPIRATION DATE: NORMAL
BH BB BLOOD TYPE BARCODE: 5100
BH BB BLOOD TYPE BARCODE: 5100
BH BB DISPENSE STATUS: NORMAL
BH BB DISPENSE STATUS: NORMAL
BH BB PRODUCT CODE: NORMAL
BH BB PRODUCT CODE: NORMAL
BH BB UNIT NUMBER: NORMAL
BH BB UNIT NUMBER: NORMAL
CROSSMATCH INTERPRETATION: NORMAL
CROSSMATCH INTERPRETATION: NORMAL
HCT VFR BLD AUTO: 25.8 % (ref 37–47)
HGB BLD-MCNC: 8.6 G/DL (ref 12–16)
UNIT  ABO: NORMAL
UNIT  ABO: NORMAL
UNIT  RH: NORMAL
UNIT  RH: NORMAL

## 2019-03-29 PROCEDURE — 85014 HEMATOCRIT: CPT | Performed by: ORTHOPAEDIC SURGERY

## 2019-03-29 PROCEDURE — 85018 HEMOGLOBIN: CPT | Performed by: ORTHOPAEDIC SURGERY

## 2019-03-29 PROCEDURE — 97110 THERAPEUTIC EXERCISES: CPT

## 2019-03-29 PROCEDURE — 97530 THERAPEUTIC ACTIVITIES: CPT

## 2019-03-29 RX ORDER — OXYCODONE AND ACETAMINOPHEN 7.5; 325 MG/1; MG/1
1 TABLET ORAL EVERY 6 HOURS PRN
Qty: 40 TABLET | Refills: 0 | Status: SHIPPED | OUTPATIENT
Start: 2019-03-29 | End: 2019-04-05

## 2019-03-29 RX ADMIN — LEVOTHYROXINE SODIUM 125 MCG: 125 TABLET ORAL at 05:47

## 2019-03-29 RX ADMIN — DICYCLOMINE HYDROCHLORIDE 10 MG: 10 CAPSULE ORAL at 08:28

## 2019-03-29 RX ADMIN — INDAPAMIDE 2.5 MG: 2.5 TABLET, FILM COATED ORAL at 08:28

## 2019-03-29 RX ADMIN — SODIUM CHLORIDE, PRESERVATIVE FREE 3 ML: 5 INJECTION INTRAVENOUS at 08:29

## 2019-03-29 RX ADMIN — OXYCODONE HYDROCHLORIDE AND ACETAMINOPHEN 1 TABLET: 7.5; 325 TABLET ORAL at 10:07

## 2019-03-29 RX ADMIN — PANTOPRAZOLE SODIUM 40 MG: 40 TABLET, DELAYED RELEASE ORAL at 21:56

## 2019-03-29 RX ADMIN — PANTOPRAZOLE SODIUM 40 MG: 40 TABLET, DELAYED RELEASE ORAL at 05:47

## 2019-03-29 RX ADMIN — PROPRANOLOL HYDROCHLORIDE 40 MG: 40 TABLET ORAL at 08:28

## 2019-03-29 RX ADMIN — ROSUVASTATIN CALCIUM 5 MG: 10 TABLET, FILM COATED ORAL at 21:56

## 2019-03-29 RX ADMIN — OXYCODONE HYDROCHLORIDE AND ACETAMINOPHEN 1 TABLET: 7.5; 325 TABLET ORAL at 17:30

## 2019-03-29 RX ADMIN — LOSARTAN POTASSIUM: 50 TABLET, FILM COATED ORAL at 08:28

## 2019-03-29 RX ADMIN — SERTRALINE 100 MG: 100 TABLET, FILM COATED ORAL at 08:28

## 2019-03-30 LAB
HCT VFR BLD AUTO: 28.7 % (ref 37–47)
HGB BLD-MCNC: 9.7 G/DL (ref 12–16)

## 2019-03-30 PROCEDURE — 85018 HEMOGLOBIN: CPT | Performed by: ORTHOPAEDIC SURGERY

## 2019-03-30 PROCEDURE — 94799 UNLISTED PULMONARY SVC/PX: CPT

## 2019-03-30 PROCEDURE — 97110 THERAPEUTIC EXERCISES: CPT

## 2019-03-30 PROCEDURE — 85014 HEMATOCRIT: CPT | Performed by: ORTHOPAEDIC SURGERY

## 2019-03-30 PROCEDURE — 97530 THERAPEUTIC ACTIVITIES: CPT

## 2019-03-30 RX ADMIN — DICYCLOMINE HYDROCHLORIDE 10 MG: 10 CAPSULE ORAL at 09:22

## 2019-03-30 RX ADMIN — OXYCODONE HYDROCHLORIDE AND ACETAMINOPHEN 1 TABLET: 7.5; 325 TABLET ORAL at 20:41

## 2019-03-30 RX ADMIN — PANTOPRAZOLE SODIUM 40 MG: 40 TABLET, DELAYED RELEASE ORAL at 09:24

## 2019-03-30 RX ADMIN — PANTOPRAZOLE SODIUM 40 MG: 40 TABLET, DELAYED RELEASE ORAL at 17:14

## 2019-03-30 RX ADMIN — OXYCODONE HYDROCHLORIDE AND ACETAMINOPHEN 1 TABLET: 7.5; 325 TABLET ORAL at 14:28

## 2019-03-30 RX ADMIN — SERTRALINE 100 MG: 100 TABLET, FILM COATED ORAL at 09:23

## 2019-03-30 RX ADMIN — LEVOTHYROXINE SODIUM 125 MCG: 125 TABLET ORAL at 06:06

## 2019-03-30 RX ADMIN — OXYCODONE HYDROCHLORIDE AND ACETAMINOPHEN 1 TABLET: 7.5; 325 TABLET ORAL at 00:06

## 2019-03-30 RX ADMIN — ROSUVASTATIN CALCIUM 5 MG: 10 TABLET, FILM COATED ORAL at 21:50

## 2019-03-31 LAB
HCT VFR BLD AUTO: 28.4 % (ref 37–47)
HGB BLD-MCNC: 9.4 G/DL (ref 12–16)

## 2019-03-31 PROCEDURE — 97110 THERAPEUTIC EXERCISES: CPT

## 2019-03-31 PROCEDURE — 85018 HEMOGLOBIN: CPT | Performed by: ORTHOPAEDIC SURGERY

## 2019-03-31 PROCEDURE — 94799 UNLISTED PULMONARY SVC/PX: CPT

## 2019-03-31 PROCEDURE — 97530 THERAPEUTIC ACTIVITIES: CPT

## 2019-03-31 PROCEDURE — 85014 HEMATOCRIT: CPT | Performed by: ORTHOPAEDIC SURGERY

## 2019-03-31 RX ADMIN — SODIUM CHLORIDE, PRESERVATIVE FREE 3 ML: 5 INJECTION INTRAVENOUS at 09:37

## 2019-03-31 RX ADMIN — OXYCODONE HYDROCHLORIDE AND ACETAMINOPHEN 1 TABLET: 7.5; 325 TABLET ORAL at 17:39

## 2019-03-31 RX ADMIN — SERTRALINE 100 MG: 100 TABLET, FILM COATED ORAL at 09:39

## 2019-03-31 RX ADMIN — OXYCODONE HYDROCHLORIDE AND ACETAMINOPHEN 1 TABLET: 7.5; 325 TABLET ORAL at 11:45

## 2019-03-31 RX ADMIN — PANTOPRAZOLE SODIUM 40 MG: 40 TABLET, DELAYED RELEASE ORAL at 06:41

## 2019-03-31 RX ADMIN — LOSARTAN POTASSIUM: 50 TABLET, FILM COATED ORAL at 09:38

## 2019-03-31 RX ADMIN — PANTOPRAZOLE SODIUM 40 MG: 40 TABLET, DELAYED RELEASE ORAL at 17:01

## 2019-03-31 RX ADMIN — INDAPAMIDE 2.5 MG: 2.5 TABLET, FILM COATED ORAL at 09:37

## 2019-03-31 RX ADMIN — LEVOTHYROXINE SODIUM 125 MCG: 125 TABLET ORAL at 06:41

## 2019-03-31 RX ADMIN — ROSUVASTATIN CALCIUM 5 MG: 10 TABLET, FILM COATED ORAL at 21:12

## 2019-03-31 RX ADMIN — PROPRANOLOL HYDROCHLORIDE 40 MG: 40 TABLET ORAL at 09:39

## 2019-03-31 RX ADMIN — DICYCLOMINE HYDROCHLORIDE 10 MG: 10 CAPSULE ORAL at 09:39

## 2019-04-01 VITALS
BODY MASS INDEX: 34.15 KG/M2 | RESPIRATION RATE: 16 BRPM | HEIGHT: 64 IN | TEMPERATURE: 98.6 F | WEIGHT: 200 LBS | DIASTOLIC BLOOD PRESSURE: 45 MMHG | HEART RATE: 81 BPM | OXYGEN SATURATION: 93 % | SYSTOLIC BLOOD PRESSURE: 102 MMHG

## 2019-04-01 LAB
HCT VFR BLD AUTO: 27.4 % (ref 37–47)
HGB BLD-MCNC: 9.1 G/DL (ref 12–16)

## 2019-04-01 PROCEDURE — 94760 N-INVAS EAR/PLS OXIMETRY 1: CPT

## 2019-04-01 PROCEDURE — 85018 HEMOGLOBIN: CPT | Performed by: ORTHOPAEDIC SURGERY

## 2019-04-01 PROCEDURE — 85014 HEMATOCRIT: CPT | Performed by: ORTHOPAEDIC SURGERY

## 2019-04-01 PROCEDURE — 94799 UNLISTED PULMONARY SVC/PX: CPT

## 2019-04-01 RX ADMIN — LEVOTHYROXINE SODIUM 125 MCG: 125 TABLET ORAL at 05:53

## 2019-04-01 RX ADMIN — PANTOPRAZOLE SODIUM 40 MG: 40 TABLET, DELAYED RELEASE ORAL at 09:11

## 2019-04-01 RX ADMIN — SERTRALINE 100 MG: 100 TABLET, FILM COATED ORAL at 09:11

## 2019-04-01 RX ADMIN — PROPRANOLOL HYDROCHLORIDE 40 MG: 40 TABLET ORAL at 09:11

## 2019-04-01 RX ADMIN — OXYCODONE HYDROCHLORIDE AND ACETAMINOPHEN 1 TABLET: 7.5; 325 TABLET ORAL at 00:33

## 2019-04-01 RX ADMIN — INDAPAMIDE 2.5 MG: 2.5 TABLET, FILM COATED ORAL at 09:11

## 2019-04-01 RX ADMIN — OXYCODONE HYDROCHLORIDE AND ACETAMINOPHEN 1 TABLET: 7.5; 325 TABLET ORAL at 14:09

## 2019-04-01 RX ADMIN — DICYCLOMINE HYDROCHLORIDE 10 MG: 10 CAPSULE ORAL at 09:11

## 2019-04-01 NOTE — PROGRESS NOTES
This patient's discharge has been postponed her new date is 3/1/2019 and will follow up in the office in 4 weeks previous discharge summary has been dictated

## 2019-04-01 NOTE — PROGRESS NOTES
Continued Stay Note  TREE Lieberman     Patient Name: Vanessa Kamara  MRN: [id number]  Today's Date: 4/1/2019    Admit Date: 3/25/2019    Discharge Plan     Row Name 04/01/19 1059       Plan    Final Discharge Disposition Code  03 - skilled nursing facility (SNF)      Row Name 04/01/19 1058       Plan    Final Note  Karyn from [facility] has contacted  in regards to precert being completed. Facility will accept pt today if ready for discharge.  will follow. Phone: [phone] Fax: [phone]         Discharge Codes    No documentation.       Expected Discharge Date and Time     Expected Discharge Date Expected Discharge Time    Mar 29, 2019             Cathleen Kathleen

## 2019-04-01 NOTE — PLAN OF CARE
Problem: Patient Care Overview  Goal: Plan of Care Review  Outcome: Ongoing (interventions implemented as appropriate)   04/01/19 0570   Coping/Psychosocial   Plan of Care Reviewed With patient;spouse   Plan of Care Review   Progress improving   OTHER   Outcome Summary Medicated once with prn pain med for c/o R hip pain, with relief noted. Dressing to R hip C/D/I. VSS. PPP. Voiding per bedpan. Assist with turning in bed. Waiting on insurance approval for [facility]. Safety maintained.      Goal: Individualization and Mutuality  Outcome: Ongoing (interventions implemented as appropriate)      Problem: Fracture Orthopaedic (Adult)  Goal: Signs and Symptoms of Listed Potential Problems Will be Absent, Minimized or Managed (Fracture Orthopaedic)  Outcome: Ongoing (interventions implemented as appropriate)      Problem: Surgery Nonspecified (Adult)  Goal: Signs and Symptoms of Listed Potential Problems Will be Absent, Minimized or Managed (Surgery Nonspecified)  Outcome: Ongoing (interventions implemented as appropriate)

## 2019-04-01 NOTE — PLAN OF CARE
Problem: Patient Care Overview  Goal: Plan of Care Review  Outcome: Outcome(s) achieved Date Met: 04/01/19    Goal: Individualization and Mutuality  Outcome: Outcome(s) achieved Date Met: 04/01/19    Goal: Discharge Needs Assessment  Outcome: Outcome(s) achieved Date Met: 04/01/19    Goal: Interprofessional Rounds/Family Conf  Outcome: Outcome(s) achieved Date Met: 04/01/19      Problem: Fracture Orthopaedic (Adult)  Goal: Signs and Symptoms of Listed Potential Problems Will be Absent, Minimized or Managed (Fracture Orthopaedic)  Outcome: Outcome(s) achieved Date Met: 04/01/19      Problem: Surgery Nonspecified (Adult)  Goal: Signs and Symptoms of Listed Potential Problems Will be Absent, Minimized or Managed (Surgery Nonspecified)  Outcome: Outcome(s) achieved Date Met: 04/01/19

## 2019-04-01 NOTE — THERAPY DISCHARGE NOTE
Acute Care - Physical Therapy Discharge Summary  [facility]       Patient Name: Vanessa Kamara  : 1945  MRN: [id number]    Today's Date: 2019  Onset of Illness/Injury or Date of Surgery: 19    Date of Referral to PT: 19  Referring Physician: Dr. magaña       Admit Date: 3/25/2019      PT Recommendation and Plan    Visit Dx:    ICD-10-CM ICD-9-CM   1. Closed fracture of distal end of right femur, unspecified fracture morphology, initial encounter (CMS/[facility]) S72.401A 821.20   2. Decreased activities of daily living (ADL) R68.89 780.99   3. Impaired mobility Z74.09 799.89       Outcome Measures     Row Name 19 0800             How much help from another person do you currently need...    Turning from your back to your side while in flat bed without using bedrails?  3  -AH      Moving from lying on back to sitting on the side of a flat bed without bedrails?  2  -AH      Moving to and from a bed to a chair (including a wheelchair)?  1  -AH      Standing up from a chair using your arms (e.g., wheelchair, bedside chair)?  1  -AH      Climbing 3-5 steps with a railing?  1  -AH      To walk in hospital room?  1  -AH      AM-PAC 6 Clicks Score  9  -AH         Functional Assessment    Outcome Measure Options  AM-PAC 6 Clicks Basic Mobility (PT)  -AH        User Key  (r) = Recorded By, (t) = Taken By, (c) = Cosigned By    Initials Name Provider Type    Tamara Vazquez PTA Physical Therapy Assistant              Rehab Goal Summary     Row Name 19 1600             Physical Therapy Goals    Bed Mobility Goal Selection (PT)  bed mobility, PT goal 1  -KJ      Transfer Goal Selection (PT)  transfer, PT goal 1  -KJ         Bed Mobility Goal 1 (PT)    Activity/Assistive Device (Bed Mobility Goal 1, PT)  bed mobility activities, all  -KJ      [place] Level/Cues Needed (Bed Mobility Goal 1, PT)  conditional independence  -KJ      Time Frame (Bed Mobility Goal 1, PT)  long term goal (LTG);by  discharge  -KJ      Progress/Outcomes (Bed Mobility Goal 1, PT)  goal not met  -KJ         Transfer Goal 1 (PT)    Activity/Assistive Device (Transfer Goal 1, PT)  sit-to-stand/stand-to-sit;bed-to-chair/chair-to-bed;toilet  -KJ      [place] Level/Cues Needed (Transfer Goal 1, PT)  supervision required  -KJ      Time Frame (Transfer Goal 1, PT)  long term goal (LTG);by discharge  -KJ      Progress/Outcome (Transfer Goal 1, PT)  goal not met  -KJ        User Key  (r) = Recorded By, (t) = Taken By, (c) = Cosigned By    Initials Name Provider Type Discipline    Maria Teresa Mack, PTA Physical Therapy Assistant PT              PT Discharge Summary  Anticipated Discharge Disposition (PT): skilled nursing facility  Reason for Discharge: Discharge from facility  Outcomes Achieved: Refer to plan of care for updates on goals achieved  Discharge Destination: SNF      Maria Teresa Chun PTA   4/1/2019

## 2019-04-02 NOTE — PAYOR COMM NOTE
"Dc to snf 4-1-19    [id number]  Vanessa Lopez (73 y.o. Female)     Date of Birth Social Security Number Address Home Phone MRN    1945  [address]  [facility] [address] [phone] [id number]    Restorationist Marital Status          Pentecostal        Admission Date Admission Type Admitting Provider Attending Provider Department, Room/Bed    3/25/19 Emergency Tu Paredes MD  [facility] 3A, 328/1    Discharge Date Discharge Disposition Discharge Destination        4/1/2019 Skilled Nursing Facility (DC - External)              Attending Provider:  (none)   Allergies:  Codeine, Lortab [Hydrocodone-acetaminophen]    Isolation:  None   Infection:  None   Code Status:  Prior    Ht:  162.6 cm (64\")   Wt:  90.7 kg (200 lb)    Admission Cmt:  None   Principal Problem:  None                Active Insurance as of 3/25/2019     Primary Coverage     Payor Plan Insurance Group Employer/Plan Group    HUMANA MEDICARE REPLACEMENT HUMANA MEDICARE REPL [id number]     Payor Plan Address Payor Plan Phone Number Payor Plan Fax Number Effective Dates    [address] [address] [phone]  1/1/2018 - None Entered    [facility] [phone]       Subscriber Name Subscriber Birth Date Member ID       VANESSA LOPEZ 1945 [id number]                 Emergency Contacts      (Rel.) Home Phone Work Phone Mobile Phone    Primo Lopez (Spouse) [phone] -- [phone]               Discharge Summary      Tu Paredes MD at 3/29/2019  9:44 AM          [facility]  DISCHARGE SUMMARY       Date of Admission: 3/25/2019  Date of Discharge:  3/29/2019  Primary Care Physician: Deann Ocampo MD    Presenting Problem/History of Present Illness:  Closed fracture of distal end of right femur, unspecified fracture morphology, initial encounter (CMS[facility]) [S72.401A]  Closed fracture of distal end of right femur, unspecified fracture morphology, initial encounter (CMS/[facility]) " "[F19.356P]     Final Discharge Diagnoses:  Active Hospital Problems    Diagnosis   • Closed fracture of right distal femur (CMS/[facility])       Consults: None    Procedures Performed: Open reduction internal fixation right distal femur fracture    Pertinent Test Results: Hemoglobin stable at 8.6    History of Present Illness on Day of Discharge: Stable on discharge    Hospital Course:  The patient is a 73 y.o. female who presented to [facility] with a comminuted closed supracondylar fracture of the right distal femur.  On the day following admission the patient was taken to surgery and open reduction and internal fixation was carried out the patient has progressed well postoperatively.  Her progress is quite slowed due to the fact that she is somewhat obese elderly and is to be strict nonweightbearing on the right she currently is discharged to skilled nursing facility in stable condition.        /50 (BP Location: Left arm, Patient Position: Lying)   Pulse 80   Temp 98.3 °F (36.8 °C) (Oral)   Resp 16   Ht 162.6 cm (64\")   Wt 90.7 kg (200 lb)   LMP  (LMP Unknown)   SpO2 95%   BMI 34.33 kg/m²        Discharge Medications:     Discharge Medications      ASK your doctor about these medications      Instructions Start Date   aspirin 81 MG chewable tablet   81 mg, Oral, Daily      candesartan-hydrochlorothiazide 32-12.5 MG per tablet  Commonly known as:  ATACAND HCT   1 tablet, Oral, Daily      cholecalciferol 1000 units tablet  Commonly known as:  VITAMIN D3   2,000 Units, Oral, Daily      dicyclomine 10 MG capsule  Commonly known as:  BENTYL   10 mg, Oral, 2 Times Daily      diphenoxylate-atropine 2.5-0.025 MG per tablet  Commonly known as:  LOMOTIL   1 tablet, Oral, 4 Times Daily PRN      esomeprazole 40 MG capsule  Commonly known as:  nexIUM   40 mg, Oral, Every Morning Before Breakfast      indapamide 2.5 MG tablet  Commonly known as:  LOZOL   2.5 mg, Oral, Daily      levothyroxine 125 MCG " tablet  Commonly known as:  SYNTHROID, LEVOTHROID   62.5 mcg, Oral, Daily      pravastatin 10 MG tablet  Commonly known as:  PRAVACHOL   10 mg, Oral, Daily      propranolol  MG 24 hr capsule  Commonly known as:  INDERAL LA   120 mg, Oral, Daily      sertraline 100 MG tablet  Commonly known as:  ZOLOFT   100 mg, Oral, Daily             Discharge Diet: Regular    Discharge Care Plan/Instructions: #1 she is to be strict nonweightbearing on the right #2 she can have physical therapy for transfers only again no weight on the right #3 she is to follow-up in the office number for them to call for any problems    Follow-up Appointments:   4 weeks      Tu Paredes MD  03/29/19  9:44 AM        Electronically signed by Tu Paredes MD at 3/29/2019  9:46 AM

## 2019-04-02 NOTE — THERAPY DISCHARGE NOTE
Acute Care - Occupational Therapy Discharge Summary  [facility]     Patient Name: Vanessa Kamara  : 1945  MRN: [id number]    Today's Date: 2019  Onset of Illness/Injury or Date of Surgery: 19    Date of Referral to OT: 19  Referring Physician: Dr. magaña       Admit Date: 3/25/2019        OT Recommendation and Plan    Visit Dx:    ICD-10-CM ICD-9-CM   1. Closed fracture of distal end of right femur, unspecified fracture morphology, initial encounter (CMS/[facility]) S72.401A 821.20   2. Decreased activities of daily living (ADL) R68.89 780.99   3. Impaired mobility Z74.09 799.89               Rehab Goal Summary     Row Name 19 0700             Bed Mobility Goal 1 (OT)    Activity/Assistive Device (Bed Mobility Goal 1, OT)  supine to sit;sit to supine  -TS      [place] Level/Cues Needed (Bed Mobility Goal 1, OT)  minimum assist (75% or more patient effort);verbal cues required  -TS      Time Frame (Bed Mobility Goal 1, OT)  long term goal (LTG);by discharge  -TS      Progress/Outcomes (Bed Mobility Goal 1, OT)  goal not met  -TS         Transfer Goal 1 (OT)    Activity/Assistive Device (Transfer Goal 1, OT)  sit-to-stand/stand-to-sit;bed-to-chair/chair-to-bed;commode, 3-in-1  -TS      [place] Level/Cues Needed (Transfer Goal 1, OT)  moderate assist (50-74% patient effort);2 person assist;verbal cues required  -TS      Time Frame (Transfer Goal 1, OT)  long term goal (LTG);by discharge  -TS      Progress/Outcome (Transfer Goal 1, OT)  goal not met  -TS         Toileting Goal 1 (OT)    Activity/Device (Toileting Goal 1, OT)  adjust/manage clothing;perform perineal hygiene;commode, 3-in-1  -TS      [place] Level/Cues Needed (Toileting Goal 1, OT)  maximum assist (25-49% patient effort);1 person assist;verbal cues required  -TS      Time Frame (Toileting Goal 1, OT)  long term goal (LTG);by discharge  -TS      Progress/Outcome (Toileting Goal 1, OT)  goal not met  -TS        User Key   (r) = Recorded By, (t) = Taken By, (c) = Cosigned By    Initials Name Provider Type Pending sale to [facility] COTA/L Occupational Therapy Assistant OT          Outcome Measures     Row Name 03/31/19 0800             How much help from another person do you currently need...    Turning from your back to your side while in flat bed without using bedrails?  3  -AH      Moving from lying on back to sitting on the side of a flat bed without bedrails?  2  -AH      Moving to and from a bed to a chair (including a wheelchair)?  1  -AH      Standing up from a chair using your arms (e.g., wheelchair, bedside chair)?  1  -AH      Climbing 3-5 steps with a railing?  1  -AH      To walk in hospital room?  1  -AH      AM-PAC 6 Clicks Score  9  -AH         Functional Assessment    Outcome Measure Options  AM-PAC 6 Clicks Basic Mobility (PT)  -        User Key  (r) = Recorded By, (t) = Taken By, (c) = Cosigned By    Initials Name Provider Type     Tamara Adan, PTA Physical Therapy Assistant          Therapy Suggested Charges     Code   Minutes Charges    75806 (CPT®) Hc Ot Neuromusc Re Education Ea 15 Min      17871 (CPT®) Hc Ot Ther Proc Ea 15 Min      34946 (CPT®) Hc Ot Therapeutic Act Ea 15 Min      12425 (CPT®) Hc Ot Manual Therapy Ea 15 Min      53905 (CPT®) Hc Ot Iontophoresis Ea 15 Min      39281 (CPT®) Hc Ot Elec Stim Ea-Per 15 Min      85254 (CPT®) Hc Ot Ultrasound Ea 15 Min      16557 (CPT®) Hc Ot Self Care/Mgmt/Train Ea 15 Min 43 3    Total  43 3              OT Discharge Summary  Reason for Discharge: Discharge from facility  Outcomes Achieved: Refer to plan of care for updates on goals achieved  Discharge Destination: [facility]      NEHEMIAH Seay  4/2/2019

## 2019-04-03 ENCOUNTER — LAB REQUISITION (OUTPATIENT)
Dept: LAB | Facility: HOSPITAL | Age: 74
End: 2019-04-03

## 2019-04-03 DIAGNOSIS — Z00.00 ENCOUNTER FOR GENERAL ADULT MEDICAL EXAMINATION WITHOUT ABNORMAL FINDINGS: ICD-10-CM

## 2019-04-03 LAB
BACTERIA UR QL AUTO: ABNORMAL /HPF
BILIRUB UR QL STRIP: NEGATIVE
CLARITY UR: ABNORMAL
COLOR UR: YELLOW
GLUCOSE UR STRIP-MCNC: NEGATIVE MG/DL
HGB UR QL STRIP.AUTO: ABNORMAL
HYALINE CASTS UR QL AUTO: ABNORMAL /LPF
KETONES UR QL STRIP: NEGATIVE
LEUKOCYTE ESTERASE UR QL STRIP.AUTO: ABNORMAL
NITRITE UR QL STRIP: NEGATIVE
PH UR STRIP.AUTO: 7.5 [PH] (ref 5–8)
PROT UR QL STRIP: ABNORMAL
RBC # UR: ABNORMAL /HPF
REF LAB TEST METHOD: ABNORMAL
SP GR UR STRIP: 1.01 (ref 1–1.03)
SQUAMOUS #/AREA URNS HPF: ABNORMAL /HPF
UROBILINOGEN UR QL STRIP: ABNORMAL
WBC UR QL AUTO: ABNORMAL /HPF

## 2019-04-03 PROCEDURE — 87086 URINE CULTURE/COLONY COUNT: CPT | Performed by: FAMILY MEDICINE

## 2019-04-03 PROCEDURE — 81001 URINALYSIS AUTO W/SCOPE: CPT | Performed by: FAMILY MEDICINE

## 2019-04-03 PROCEDURE — 87088 URINE BACTERIA CULTURE: CPT | Performed by: FAMILY MEDICINE

## 2019-04-03 PROCEDURE — 87186 SC STD MICRODIL/AGAR DIL: CPT | Performed by: FAMILY MEDICINE

## 2019-04-03 RX ORDER — CANDESARTAN 32 MG/1
TABLET ORAL
Qty: 30 TABLET | Refills: 5 | Status: SHIPPED | OUTPATIENT
Start: 2019-04-03 | End: 2019-11-02 | Stop reason: SDUPTHER

## 2019-04-05 LAB — BACTERIA SPEC AEROBE CULT: ABNORMAL

## 2019-04-25 ENCOUNTER — TELEPHONE (OUTPATIENT)
Dept: PRIMARY CARE CLINIC | Age: 74
End: 2019-04-25

## 2019-04-25 NOTE — TELEPHONE ENCOUNTER
Doris 45 Transitions Initial Follow Up Call    Call within 2 business days of discharge: Yes     Patient: Soraida Stephens Patient : 1945   MRN: [id number]  Reason for Admission: CLOSED FRACTURE OF RIGHT DISTAL FEMUR  Discharge Date: 2019 RARS: No data recorded     Spoke with: Parvin 6    Discharge department/facility: [facility]. PT IS SCHEDULED TO BE DISCHARGED FROM [facility] TODAY. WILL CONTINUE TO MONITOR THE PT FOR F/U.     Follow Up  Future Appointments   Date Time Provider Sophie Banerjee   2019  4:15 PM Oracio Hussein MD [facility]   2019  1:30 PM DANNY Clark [facility]   2019  8:45 AM Jackson Mcfadden MD [facility]   2019  8:15 AM Oracio Hussein MD [facility]       Zak Inman

## 2019-04-26 ENCOUNTER — TELEPHONE (OUTPATIENT)
Dept: PRIMARY CARE CLINIC | Age: 74
End: 2019-04-26

## 2019-04-26 NOTE — TELEPHONE ENCOUNTER
Doris 45 Transitions Initial Follow Up Call    Call within 2 business days of discharge: Yes     Patient: Feliberto Miranda Patient : 1945   MRN: [id number]  Reason for Admission: OPEN REDUCTION INTERNAL FIXATION RIGHT DISTAL FEMUR FRACTURE  Discharge Date: 2019 RARS: No data recorded     Spoke with: Veverly Knife    Discharge department/facility: [facility]    Non-face-to-face services provided:  Scheduled appointment with PCP-2019 AT 4:15PM WITH DR. Faina Arnold  Scheduled appointment with Specialist-SHE DOES HAVE F/U WITH DR. Briseyda Mistry AND WILL BE NON WEIGHT BEARING FOR APPROXIMATELY 2 MORE MONTHS. Obtained and reviewed discharge summary and/or continuity of care documents  Reviewed and followed up on pending diagnostic tests and treatments-PT IS CURRENTLY NON-WEIGHT BEARING.  IS THERE IN THE HOME TO HELP. SHE IS TRANSFERRING HERSELF FROM W/C TO BED, ETC.  ASSISTS. Education of patient/family/caregiver/guardian to support self-management-ENCOURAGED THE PT TO MOVE ABOUT WITH NO WEIGHT BEARING ON THAT LEG. APPETITE IS GOOD. SHE STATES SHE IS FEELING [facility] HEATHER. Assessment and support for treatment adherence and medication management-PT IS ONLY TAKING PAIN MEDS WHEN SHE IS HURTING A NIGHT. SHE IS USING THEM SPARINGLY. SHE IS TAKING HER MEDS WITHOUT DIFF.      Follow Up  Future Appointments   Date Time Provider Sophie Banerjee   2019  4:15 PM Kenya Doan MD [facility]   2019  1:30 PM DANNY Fernando [facility]   2019  8:45 AM Brendan Guy MD [facility]   2019  8:15 AM Kenya Doan MD [facility]       Aleja Morton

## 2019-04-29 ENCOUNTER — OFFICE VISIT (OUTPATIENT)
Dept: PRIMARY CARE CLINIC | Age: 74
End: 2019-04-29
Payer: MEDICARE

## 2019-04-29 VITALS
TEMPERATURE: 96.3 F | HEART RATE: 64 BPM | HEIGHT: 63 IN | DIASTOLIC BLOOD PRESSURE: 68 MMHG | RESPIRATION RATE: 22 BRPM | BODY MASS INDEX: 34.38 KG/M2 | OXYGEN SATURATION: 97 % | SYSTOLIC BLOOD PRESSURE: 110 MMHG | WEIGHT: 194 LBS

## 2019-04-29 DIAGNOSIS — S72.91XD CLOSED FRACTURE OF RIGHT FEMUR WITH ROUTINE HEALING, UNSPECIFIED FRACTURE MORPHOLOGY, UNSPECIFIED PORTION OF FEMUR, SUBSEQUENT ENCOUNTER: ICD-10-CM

## 2019-04-29 DIAGNOSIS — R94.6 ABNORMAL THYROID FUNCTION TEST: ICD-10-CM
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
## 2019-04-29 DIAGNOSIS — S02.2XXD CLOSED FRACTURE OF NASAL BONE WITH ROUTINE HEALING, SUBSEQUENT ENCOUNTER: ICD-10-CM

## 2019-04-29 DIAGNOSIS — R79.89 ELEVATED TSH: ICD-10-CM

## 2019-04-29 LAB
T4 FREE: 1.4 NG/DL (ref 0.9–1.7)
TSH SERPL DL<=0.05 MIU/L-ACNC: 7.19 UIU/ML (ref 0.27–4.2)

## 2019-04-29 PROCEDURE — 36415 COLL VENOUS BLD VENIPUNCTURE: CPT | Performed by: FAMILY MEDICINE

## 2019-04-29 PROCEDURE — 99495 TRANSJ CARE MGMT MOD F2F 14D: CPT | Performed by: FAMILY MEDICINE

## 2019-04-29 PROCEDURE — 1111F DSCHRG MED/CURRENT MED MERGE: CPT | Performed by: FAMILY MEDICINE

## 2019-04-29 RX ORDER — OXYCODONE AND ACETAMINOPHEN 7.5; 325 MG/1; MG/1
1 TABLET ORAL EVERY 6 HOURS PRN
COMMUNITY
End: 2019-08-12

## 2019-04-29 RX ORDER — ONDANSETRON 4 MG/1
4 TABLET, FILM COATED ORAL EVERY 12 HOURS PRN
COMMUNITY
End: 2019-06-25

## 2019-04-29 NOTE — PROGRESS NOTES
YOB: 1945  Location: [facility]  Location Address: [address] [place] [address], [facility], [address] [place] [address]  Location Phone: [phone]    Chief Complaint   Patient presents with   • Deviated septum       History of Present Illness  Vanessa Kamara is a 73 y.o. female.  Vanessa Kamara is here for evaluation of ENT complaints. The patient has had problems with deviated septum.  The symptoms are not localized to a particular location. The patient has had mild to moderate symptoms. The symptoms have been present for the last 1 month There have been no identified factors that aggravate the symptoms. There have been no factors that have improved the symptoms. Patient states she had a fall on 3/25/19 and present to the ER with facial trauma and broken leg. She states they proceeded with a maxillofacial CT and she was told that she had a deviated septum but nothing was followed up on. She had orthopaedic surgery by Dr. Paredes on her right leg. Dr. Paredes referred her for evaluation of her nasal complaints. Patient has headaches and facial bruising. She denies nasal congestion, nasal drainage, postnasal drainage, sinus pressure and epistaxis.     She denies nasal congestion or obstruction and has not completely decided with the way her nose looks since she states it is crooked.  Study Result     CT MAXILLOFACIAL WO CONT- 3/25/2019 2:48 PM CDT     HISTORY: ] Bruising after a fall. Laceration in the frontal scalp.      COMPARISON: None      DOSE LENGTH PRODUCT: 617 mGy cm. Automated exposure control was also  utilized to decrease patient radiation dose.     TECHNIQUE: Serial helical tomographic images of the facial bones were  obtained without contrast. Multiplanar reformatted images were provided  for review.      FINDINGS:   Orbits: Intact.     Sinuses: There is complete opacification of the RIGHT sphenoid sinus.  Mucosal thickening is present in the inferior RIGHT maxillary sinus.     Nasal septum: Marked  deviation to the LEFT with a leftward bony spur.     Temporal bones: No fractures. Mastoid air cells are clear.     Temporomandibular joints: Degenerative changes are present.     Mandible: Intact.     Soft tissues: A RIGHT frontal bruise or laceration is present.      Other: No other fractures identified.     IMPRESSION:  1. No evidence of acute facial bone injury.   2. Sinus disease and small frontal laceration or small hematoma.        This report was finalized on 03/25/2019 15:18 by Dr. Lele Gautam MD     Study Result     CT HEAD WO CONTRAST- 3/25/2019 2:48 PM CDT     HISTORY: Fall, forehead trauma. Left eye bruising.     COMPARISON: None      DOSE LENGTH PRODUCT: 617 mGy cm. Automated exposure control was also  utilized to decrease patient radiation dose.     TECHNIQUE: Helical tomographic images of the brain were obtained without  the use of intravenous contrast.      FINDINGS:   Hemorrhage: None.     Mass effect: No midline shift or mass effect.     Ventricles: Normal and symmetric without hydrocephalus.     Basilar cisterns: Normal.     Sulci: Normal in configuration. No sulcal effacement.     Extra-axial fluid: No abnormal extra-axial fluid collections.     Grey and white matter differentiation: Calcifications are seen in the  basal ganglia. Gray and white matter differentiation is maintained.     Posterior fossa and brainstem: Normal.     Bones: No fractures or lesions.     Soft tissues: A frontal scalp laceration is present.     Sinuses and mastoid air cells: There is opacification of the RIGHT  sphenoid sinus.     IMPRESSION:  1. RIGHT frontal laceration without evidence of acute intracranial  process.        This report was finalized on 03/25/2019 15:09 by Dr. Lele Gautam MD                    Past Medical History:   Diagnosis Date   • Disease of thyroid gland    • Elevated lipids    • Fracture, femur (CMS/HCC)     Right   • GERD (gastroesophageal reflux disease)    • Hiatal hernia    •  Hypertension    • Osteoporosis        Past Surgical History:   Procedure Laterality Date   • ANCHOVY PROCEDURE     • BREAST BIOPSY      Benign breast tumors   • CHOLECYSTECTOMY     • COLON RESECTION     • FEMUR OPEN REDUCTION INTERNAL FIXATION Right 3/26/2019    Procedure: OPEN REDUCTION AND INTERNAL FIXATION-FEMUR;  Surgeon: Tu Paredes MD;  Location: [facility];  Service: Orthopedics   • HAND SURGERY Right    • HYSTERECTOMY         Outpatient Medications Marked as Taking for the 4/30/19 encounter (Office Visit) with Ernesto Rosas MD   Medication Sig Dispense Refill   • aspirin 81 MG chewable tablet Chew 81 mg Daily.     • candesartan-hydrochlorothiazide (ATACAND HCT) 32-12.5 MG per tablet Take 1 tablet by mouth Daily.     • cholecalciferol (VITAMIN D3) 1000 units tablet Take 2,000 Units by mouth Daily.     • dicyclomine (BENTYL) 10 MG capsule Take 10 mg by mouth 2 (Two) Times a Day.     • diphenoxylate-atropine (LOMOTIL) 2.5-0.025 MG per tablet Take 1 tablet by mouth 4 (Four) Times a Day As Needed for Diarrhea.     • esomeprazole (nexIUM) 40 MG capsule Take 40 mg by mouth Every Morning Before Breakfast.     • HYDROcodone-acetaminophen (NORCO) 5-325 MG per tablet Take 1 tablet by mouth Every 6 (Six) Hours As Needed.     • indapamide (LOZOL) 2.5 MG tablet Take 2.5 mg by mouth Daily.     • levothyroxine (SYNTHROID, LEVOTHROID) 125 MCG tablet Take 62.5 mcg by mouth Daily.     • pravastatin (PRAVACHOL) 10 MG tablet Take 10 mg by mouth Daily.     • propranolol LA (INDERAL LA) 120 MG 24 hr capsule Take 120 mg by mouth Daily.     • sertraline (ZOLOFT) 100 MG tablet Take 100 mg by mouth Daily.         Codeine    Family History   Problem Relation Age of Onset   • No Known Problems Mother    • No Known Problems Father        Social History     Socioeconomic History   • Marital status:      Spouse name: Not on file   • Number of children: Not on file   • Years of education: Not on file   • Highest  education level: Not on file   Tobacco Use   • Smoking status: Never Smoker   • Smokeless tobacco: Never Used   Substance and Sexual Activity   • Alcohol use: No     Frequency: Never   • Drug use: No   • Sexual activity: Defer       Review of Systems   Constitutional: Negative.    HENT:        Facial bruising, deviated septum   Eyes: Negative.    Respiratory: Negative.    Cardiovascular: Negative.    Gastrointestinal: Negative.    Endocrine: Negative.    Genitourinary: Negative.    Musculoskeletal: Negative.    Skin: Negative.    Neurological: Positive for headaches.   Hematological: Negative.    Psychiatric/Behavioral: Negative.        Vitals:    04/30/19 1122   BP: 130/80   Temp: 97.4 °F (36.3 °C)       Body mass index is 34.33 kg/m².    Objective     Physical Exam  CONSTITUTIONAL: well nourished, alert, oriented, in no acute distress     COMMUNICATION AND VOICE: able to communicate normally, normal voice quality    HEAD: normocephalic, no lesions, atraumatic, no tenderness, no masses     FACE: appearance normal, no lesions, no tenderness, no deformities, facial motion symmetric    SALIVARY GLANDS: parotid glands with no tenderness, no swelling, no masses, submandibular glands with normal size, nontender    EYES: ocular motility normal, eyelids normal, orbits normal, no proptosis, conjunctiva normal , pupils equal, round     EARS:  Hearing: response to conversational voice normal bilaterally   External Ears: auricles without lesions  Otoscopic: tympanic membrane appearance normal, no lesions, no perforation, normal mobility, no fluid    NOSE:  External Nose: Mild right depressed nasal fracture mild tip deflection (She does have a previous history of excision of a basal cell carcinoma of the right columella.), no tenderness on palpation, no nasal discharge, no lesions, nostrils patent   Intranasal Exam: nasal mucosa normal, vestibule within normal limits, inferior turbinate normal, nasal septum midline    Nasopharynx:     ORAL:  Lips: upper and lower lips without lesion   Teeth:  Gums: gingivae healthy   Oral Mucosa: oral mucosa normal, no mucosal lesions   Floor of Mouth: Warthin’s duct patent, mucosa normal  Tongue: lingual mucosa normal without lesions, normal tongue mobility   Palate: soft and hard palates with normal mucosa and structure  Oropharynx: oropharyngeal mucosa normal    HYPOPHARYNX:   LARYNX: epiglottis and arytenoid cartilage within normal limits, vocal cord mucosa normal with normal mobility     NECK: neck appearance normal, no mass,  noted without erythema or tenderness    THYROID: no overt thyromegaly, no tenderness, nodules or mass present on palpation, position midline     LYMPH NODES: no lymphadenopathy    CHEST/RESPIRATORY: respiratory effort normal, normal breath sounds     CARDIOVASCULAR: rate and rhythm normal, extremities without cyanosis or edema      NEUROLOGIC/PSYCHIATRIC: oriented to time, place and person, mood normal, affect appropriate, CN II-XII intact grossly    Assessment/Plan   Vanessa was seen today for deviated septum.    Diagnoses and all orders for this visit:    Closed fracture of nasal bone with delayed healing, subsequent encounter      * Surgery not found *  No orders of the defined types were placed in this encounter.    Return in about 6 months (around 10/30/2019).       Patient Instructions   Risk benefits and options regarding formal rhinoplasty for cosmetic deformity associated with previous trauma were discussed.  My recommendation was to wait 6 months to year.  She was amenable stenosis functioning well and she is breathing without difficulty and she will be followed up in 6 months for further considerations.        MyPlate from USDA  MyPlate is an outline of a general healthy diet based on the 2010 Dietary Guidelines for Americans, from the U.S. Department of Agriculture (USDA). It sets guidelines for how much food you should eat from each food group based on  your age, sex, and level of physical activity.  What are tips for following MyPlate?  To follow MyPlate recommendations:  · Eat a wide variety of fruits and vegetables, grains, and protein foods.  · Serve smaller portions and eat less food throughout the day.  · Limit portion sizes to avoid overeating.  · Enjoy your food.  · Get at least 150 minutes of exercise every week. This is about 30 minutes each day, 5 or more days per week.    It can be difficult to have every meal look like MyPlate. Think about MyPlate as eating guidelines for an entire day, rather than each individual meal.  Fruits and Vegetables  · Make half of your plate fruits and vegetables.  · Eat many different colors of fruits and vegetables each day.  · For a 2,000 calorie daily food plan, eat:  ? 2½ cups of vegetables every day.  ? 2 cups of fruit every day.  · 1 cup is equal to:  ? 1 cup raw or cooked vegetables.  ? 1 cup raw fruit.  ? 1 medium-sized orange, apple, or banana.  ? 1 cup 100% fruit or vegetable juice.  ? 2 cups raw leafy greens, such as lettuce, spinach, or kale.  ? ½ cup dried fruit.  Grains  · One fourth of your plate should be grains.  · Make at least half of the grains you eat each day whole grains.  · For a 2,000 calorie daily food plan, eat 6 oz of grains every day.  · 1 oz is equal to:  ? 1 slice bread.  ? 1 cup cereal.  ? ½ cup cooked rice, cereal, or pasta.  Protein  · One fourth of your plate should be protein.  · Eat a wide variety of protein foods, including meat, poultry, fish, eggs, beans, nuts, and tofu.  · For a 2,000 calorie daily food plan, eat 5½ oz of protein every day.  · 1 oz is equal to:  ? 1 oz meat, poultry, or fish.  ? ¼ cup cooked beans.  ? 1 egg.  ? ½ oz nuts or seeds.  ? 1 Tbsp peanut butter.  Dairy  · Drink fat-free or low-fat (1%) milk.  · Eat or drink dairy as a side to meals.  · For a 2,000 calorie daily food plan, eat or drink 3 cups of dairy every day.  · 1 cup is equal to:  ? 1 cup milk, yogurt,  cottage cheese, or soy milk (soy beverage).  ? 2 oz processed cheese.  ? 1½ oz natural cheese.  Fats, oils, salt, and sugars  · Only small amounts of oils are recommended.  · Avoid foods that are high in calories and low in nutritional value (empty calories), like foods high in fat or added sugars.  · Choose foods that are low in salt (sodium). Choose foods that have less than 140 milligrams (mg) of sodium per serving.  · Drink water instead of sugary drinks. Drink enough water each day to keep your urine pale yellow.  Where to find support  · Work with your health care provider or a nutrition specialist (dietitian) to develop a customized eating plan that is right for you.  · Download an eduardo (mobile application) to help you track your daily food intake.  Where to find more information  · Go to ChooseMyPlate.gov for more information.  · Learn more and log your daily food intake according to MyPlate using [employer]'s nprogresscker: www.[employer].gov  Summary  · MyPlate is a general guideline for healthy eating from the USDA. It is based on the 2010 Dietary Guidelines for Americans.  · In general, fruits and vegetables should take up ½ of your plate, grains should take up ¼ of your plate, and protein should take up ¼ of your plate.  This information is not intended to replace advice given to you by your health care provider. Make sure you discuss any questions you have with your health care provider.  Document Released: 01/06/2009 Document Revised: 03/19/2018 Document Reviewed: 03/19/2018  Elsevier Interactive Patient Education © 2019 [employer]     Calorie Counting for Weight Loss  Calories are units of energy. Your body needs a certain amount of calories from food to keep you going throughout the day. When you eat more calories than your body needs, your body stores the extra calories as fat. When you eat fewer calories than your body needs, your body burns fat to get the energy it needs.  Calorie counting  means keeping track of how many calories you eat and drink each day. Calorie counting can be helpful if you need to lose weight. If you make sure to eat fewer calories than your body needs, you should lose weight. Ask your health care provider what a healthy weight is for you.  For calorie counting to work, you will need to eat the right number of calories in a day in order to lose a healthy amount of weight per week. A dietitian can help you determine how many calories you need in a day and will give you suggestions on how to reach your calorie goal.  · A healthy amount of weight to lose per week is usually 1-2 lb (0.5-0.9 kg). This usually means that your daily calorie intake should be reduced by 500-750 calories.  · Eating 1,200 - 1,500 calories per day can help most women lose weight.  · Eating 1,500 - 1,800 calories per day can help most men lose weight.    What is my plan?  My goal is to have __________ calories per day.  If I have this many calories per day, I should lose around __________ pounds per week.  What do I need to know about calorie counting?  In order to meet your daily calorie goal, you will need to:  · Find out how many calories are in each food you would like to eat. Try to do this before you eat.  · Decide how much of the food you plan to eat.  · Write down what you ate and how many calories it had. Doing this is called keeping a food log.    To successfully lose weight, it is important to balance calorie counting with a healthy lifestyle that includes regular activity. Aim for 150 minutes of moderate exercise (such as walking) or 75 minutes of vigorous exercise (such as running) each week.  Where do I find calorie information?    The number of calories in a food can be found on a Nutrition Facts label. If a food does not have a Nutrition Facts label, try to look up the calories online or ask your dietitian for help.  Remember that calories are listed per serving. If you choose to have more  than one serving of a food, you will have to multiply the calories per serving by the amount of servings you plan to eat. For example, the label on a package of bread might say that a serving size is 1 slice and that there are 90 calories in a serving. If you eat 1 slice, you will have eaten 90 calories. If you eat 2 slices, you will have eaten 180 calories.  How do I keep a food log?  Immediately after each meal, record the following information in your food log:  · What you ate. Don't forget to include toppings, sauces, and other extras on the food.  · How much you ate. This can be measured in cups, ounces, or number of items.  · How many calories each food and drink had.  · The total number of calories in the meal.    Keep your food log near you, such as in a small notebook in your pocket, or use a mobile eduardo or website. Some programs will calculate calories for you and show you how many calories you have left for the day to meet your goal.  What are some calorie counting tips?  · Use your calories on foods and drinks that will fill you up and not leave you hungry:  ? Some examples of foods that fill you up are nuts and nut butters, vegetables, lean proteins, and high-fiber foods like whole grains. High-fiber foods are foods with more than 5 g fiber per serving.  ? Drinks such as sodas, specialty coffee drinks, alcohol, and juices have a lot of calories, yet do not fill you up.  · Eat nutritious foods and avoid empty calories. Empty calories are calories you get from foods or beverages that do not have many vitamins or protein, such as candy, sweets, and soda. It is better to have a nutritious high-calorie food (such as an avocado) than a food with few nutrients (such as a bag of chips).  · Know how many calories are in the foods you eat most often. This will help you calculate calorie counts faster.  · Pay attention to calories in drinks. Low-calorie drinks include water and unsweetened drinks.  · Pay attention  "to nutrition labels for \"low fat\" or \"fat free\" foods. These foods sometimes have the same amount of calories or more calories than the full fat versions. They also often have added sugar, starch, or salt, to make up for flavor that was removed with the fat.  · Find a way of tracking calories that works for you. Get creative. Try different apps or programs if writing down calories does not work for you.  What are some portion control tips?  · Know how many calories are in a serving. This will help you know how many servings of a certain food you can have.  · Use a measuring cup to measure serving sizes. You could also try weighing out portions on a kitchen scale. With time, you will be able to estimate serving sizes for some foods.  · Take some time to put servings of different foods on your favorite plates, bowls, and cups so you know what a serving looks like.  · Try not to eat straight from a bag or box. Doing this can lead to overeating. Put the amount you would like to eat in a cup or on a plate to make sure you are eating the right portion.  · Use smaller plates, glasses, and bowls to prevent overeating.  · Try not to multitask (for example, watch TV or use your computer) while eating. If it is time to eat, sit down at a table and enjoy your food. This will help you to know when you are full. It will also help you to be aware of what you are eating and how much you are eating.  What are tips for following this plan?  Reading food labels  · Check the calorie count compared to the serving size. The serving size may be smaller than what you are used to eating.  · Check the source of the calories. Make sure the food you are eating is high in vitamins and protein and low in saturated and trans fats.  Shopping  · Read nutrition labels while you shop. This will help you make healthy decisions before you decide to purchase your food.  · Make a grocery list and stick to it.  Cooking  · Try to cook your favorite foods " in a healthier way. For example, try baking instead of frying.  · Use low-fat dairy products.  Meal planning  · Use more fruits and vegetables. Half of your plate should be fruits and vegetables.  · Include lean proteins like poultry and fish.  How do I count calories when eating out?  · Ask for smaller portion sizes.  · Consider sharing an entree and sides instead of getting your own entree.  · If you get your own entree, eat only half. Ask for a box at the beginning of your meal and put the rest of your entree in it so you are not tempted to eat it.  · If calories are listed on the menu, choose the lower calorie options.  · Choose dishes that include vegetables, fruits, whole grains, low-fat dairy products, and lean protein.  · Choose items that are boiled, broiled, grilled, or steamed. Stay away from items that are buttered, battered, fried, or served with cream sauce. Items labeled “crispy” are usually fried, unless stated otherwise.  · Choose water, low-fat milk, unsweetened iced tea, or other drinks without added sugar. If you want an alcoholic beverage, choose a lower calorie option such as a glass of wine or light beer.  · Ask for dressings, sauces, and syrups on the side. These are usually high in calories, so you should limit the amount you eat.  · If you want a salad, choose a garden salad and ask for grilled meats. Avoid extra toppings like holliday, cheese, or fried items. Ask for the dressing on the side, or ask for olive oil and vinegar or lemon to use as dressing.  · Estimate how many servings of a food you are given. For example, a serving of cooked rice is ½ cup or about the size of half a baseball. Knowing serving sizes will help you be aware of how much food you are eating at restaurants. The list below tells you how big or small some common portion sizes are based on everyday objects:  ? 1 oz--4 stacked dice.  ? 3 oz--1 deck of cards.  ? 1 tsp--1 die.  ? 1 Tbsp--½ a ping-pong ball.  ? 2 Tbsp--1  ping-pong ball.  ? ½ cup--½ baseball.  ? 1 cup--1 baseball.  Summary  · Calorie counting means keeping track of how many calories you eat and drink each day. If you eat fewer calories than your body needs, you should lose weight.  · A healthy amount of weight to lose per week is usually 1-2 lb (0.5-0.9 kg). This usually means reducing your daily calorie intake by 500-750 calories.  · The number of calories in a food can be found on a Nutrition Facts label. If a food does not have a Nutrition Facts label, try to look up the calories online or ask your dietitian for help.  · Use your calories on foods and drinks that will fill you up, and not on foods and drinks that will leave you hungry.  · Use smaller plates, glasses, and bowls to prevent overeating.  This information is not intended to replace advice given to you by your health care provider. Make sure you discuss any questions you have with your health care provider.  Document Released: 12/18/2006 Document Revised: 11/17/2017 Document Reviewed: 11/17/2017  [employer] Interactive Patient Education © 2019 [employer]     Exercising to Lose Weight  Exercising can help you to lose weight. In order to lose weight through exercise, you need to do vigorous-intensity exercise. You can tell that you are exercising with vigorous intensity if you are breathing very hard and fast and cannot hold a conversation while exercising.  Moderate-intensity exercise helps to maintain your current weight. You can tell that you are exercising at a moderate level if you have a higher heart rate and faster breathing, but you are still able to hold a conversation.  How often should I exercise?  Choose an activity that you enjoy and set realistic goals. Your health care provider can help you to make an activity plan that works for you. Exercise regularly as directed by your health care provider. This may include:  · Doing resistance training twice each week, such  as:  ? Push-ups.  ? Sit-ups.  ? Lifting weights.  ? Using resistance bands.  · Doing a given intensity of exercise for a given amount of time. Choose from these options:  ? 150 minutes of moderate-intensity exercise every week.  ? 75 minutes of vigorous-intensity exercise every week.  ? A mix of moderate-intensity and vigorous-intensity exercise every week.    Children, pregnant women, people who are out of shape, people who are overweight, and older adults may need to consult a health care provider for individual recommendations. If you have any sort of medical condition, be sure to consult your health care provider before starting a new exercise program.  What are some activities that can help me to lose weight?  · Walking at a rate of at least 4.5 miles an hour.  · Jogging or running at a rate of 5 miles per hour.  · Biking at a rate of at least 10 miles per hour.  · Lap swimming.  · Roller-skating or in-line skating.  · Cross-country skiing.  · Vigorous competitive sports, such as football, basketball, and soccer.  · Jumping rope.  · Aerobic dancing.  How can I be more active in my day-to-day activities?  · Use the stairs instead of the elevator.  · Take a walk during your lunch break.  · If you drive, park your car farther away from work or school.  · If you take public transportation, get off one stop early and walk the rest of the way.  · Make all of your phone calls while standing up and walking around.  · Get up, stretch, and walk around every 30 minutes throughout the day.  What guidelines should I follow while exercising?  · Do not exercise so much that you hurt yourself, feel dizzy, or get very short of breath.  · Consult your health care provider prior to starting a new exercise program.  · Wear comfortable clothes and shoes with good support.  · Drink plenty of water while you exercise to prevent dehydration or heat stroke. Body water is lost during exercise and must be replaced.  · Work out until you  breathe faster and your heart beats faster.  This information is not intended to replace advice given to you by your health care provider. Make sure you discuss any questions you have with your health care provider.  Document Released: 01/20/2012 Document Revised: 05/25/2017 Document Reviewed: 05/21/2015  [employer] Interactive Patient Education © 2019 [employer]

## 2019-04-30 ENCOUNTER — OFFICE VISIT (OUTPATIENT)
Dept: OTOLARYNGOLOGY | Facility: CLINIC | Age: 74
End: 2019-04-30

## 2019-04-30 VITALS
SYSTOLIC BLOOD PRESSURE: 130 MMHG | WEIGHT: 200 LBS | TEMPERATURE: 97.4 F | BODY MASS INDEX: 34.15 KG/M2 | DIASTOLIC BLOOD PRESSURE: 80 MMHG | HEIGHT: 64 IN

## 2019-04-30 DIAGNOSIS — S02.2XXG CLOSED FRACTURE OF NASAL BONE WITH DELAYED HEALING, SUBSEQUENT ENCOUNTER: Primary | ICD-10-CM

## 2019-04-30 PROBLEM — S02.2XXA CLOSED FRACTURE OF NASAL BONES: Status: ACTIVE | Noted: 2019-04-30

## 2019-04-30 PROCEDURE — 99203 OFFICE O/P NEW LOW 30 MIN: CPT | Performed by: OTOLARYNGOLOGY

## 2019-04-30 RX ORDER — HYDROCODONE BITARTRATE AND ACETAMINOPHEN 5; 325 MG/1; MG/1
1 TABLET ORAL EVERY 6 HOURS PRN
COMMUNITY
Start: 2018-04-22 | End: 2023-01-26

## 2019-04-30 RX ORDER — ONDANSETRON 4 MG/1
TABLET, FILM COATED ORAL
COMMUNITY
Start: 2019-04-25

## 2019-04-30 NOTE — PATIENT INSTRUCTIONS
Risk benefits and options regarding formal rhinoplasty for cosmetic deformity associated with previous trauma were discussed.  My recommendation was to wait 6 months to year.  She was amenable stenosis functioning well and she is breathing without difficulty and she will be followed up in 6 months for further considerations.        MyPlate from USDA  MyPlate is an outline of a general healthy diet based on the 2010 Dietary Guidelines for Americans, from the U[place] Department of Agriculture (USDA). It sets guidelines for how much food you should eat from each food group based on your age, sex, and level of physical activity.  What are tips for following MyPlate?  To follow MyPlate recommendations:  · Eat a wide variety of fruits and vegetables, grains, and protein foods.  · Serve smaller portions and eat less food throughout the day.  · Limit portion sizes to avoid overeating.  · Enjoy your food.  · Get at least 150 minutes of exercise every week. This is about 30 minutes each day, 5 or more days per week.    It can be difficult to have every meal look like MyPlate. Think about MyPlate as eating guidelines for an entire day, rather than each individual meal.  Fruits and Vegetables  · Make half of your plate fruits and vegetables.  · Eat many different colors of fruits and vegetables each day.  · For a 2,000 calorie daily food plan, eat:  ? 2½ cups of vegetables every day.  ? 2 cups of fruit every day.  · 1 cup is equal to:  ? 1 cup raw or cooked vegetables.  ? 1 cup raw fruit.  ? 1 medium-sized orange, apple, or banana.  ? 1 cup 100% fruit or vegetable juice.  ? 2 cups raw leafy greens, such as lettuce, spinach, or kale.  ? ½ cup dried fruit.  Grains  · One fourth of your plate should be grains.  · Make at least half of the grains you eat each day whole grains.  · For a 2,000 calorie daily food plan, eat 6 oz of grains every day.  · 1 oz is equal to:  ? 1 slice bread.  ? 1 cup cereal.  ? ½ cup cooked rice, cereal, or  pasta.  Protein  · One fourth of your plate should be protein.  · Eat a wide variety of protein foods, including meat, poultry, fish, eggs, beans, nuts, and tofu.  · For a 2,000 calorie daily food plan, eat 5½ oz of protein every day.  · 1 oz is equal to:  ? 1 oz meat, poultry, or fish.  ? ¼ cup cooked beans.  ? 1 egg.  ? ½ oz nuts or seeds.  ? 1 Tbsp peanut butter.  Dairy  · Drink fat-free or low-fat (1%) milk.  · Eat or drink dairy as a side to meals.  · For a 2,000 calorie daily food plan, eat or drink 3 cups of dairy every day.  · 1 cup is equal to:  ? 1 cup milk, yogurt, cottage cheese, or soy milk (soy beverage).  ? 2 oz processed cheese.  ? 1½ oz natural cheese.  Fats, oils, salt, and sugars  · Only small amounts of oils are recommended.  · Avoid foods that are high in calories and low in nutritional value (empty calories), like foods high in fat or added sugars.  · Choose foods that are low in salt (sodium). Choose foods that have less than 140 milligrams (mg) of sodium per serving.  · Drink water instead of sugary drinks. Drink enough water each day to keep your urine pale yellow.  Where to find support  · Work with your health care provider or a nutrition specialist (dietitian) to develop a customized eating plan that is right for you.  · Download an eduardo (mobile application) to help you track your daily food intake.  Where to find more information  · Go to ChooseMyPlate.gov for more information.  · Learn more and log your daily food intake according to MyPlate using USDA's SuperTracker: www.supertracker.usda.gov  Summary  · MyPlate is a general guideline for healthy eating from the USDA. It is based on the 2010 Dietary Guidelines for Americans.  · In general, fruits and vegetables should take up ½ of your plate, grains should take up ¼ of your plate, and protein should take up ¼ of your plate.  This information is not intended to replace advice given to you by your health care provider. Make sure you  discuss any questions you have with your health care provider.  Document Released: 01/06/2009 Document Revised: 03/19/2018 Document Reviewed: 03/19/2018  [employer] Interactive Patient Education © 2019 [employer]     Calorie Counting for Weight Loss  Calories are units of energy. Your body needs a certain amount of calories from food to keep you going throughout the day. When you eat more calories than your body needs, your body stores the extra calories as fat. When you eat fewer calories than your body needs, your body burns fat to get the energy it needs.  Calorie counting means keeping track of how many calories you eat and drink each day. Calorie counting can be helpful if you need to lose weight. If you make sure to eat fewer calories than your body needs, you should lose weight. Ask your health care provider what a healthy weight is for you.  For calorie counting to work, you will need to eat the right number of calories in a day in order to lose a healthy amount of weight per week. A dietitian can help you determine how many calories you need in a day and will give you suggestions on how to reach your calorie goal.  · A healthy amount of weight to lose per week is usually 1-2 lb (0.5-0.9 kg). This usually means that your daily calorie intake should be reduced by 500-750 calories.  · Eating 1,200 - 1,500 calories per day can help most women lose weight.  · Eating 1,500 - 1,800 calories per day can help most men lose weight.    What is my plan?  My goal is to have __________ calories per day.  If I have this many calories per day, I should lose around __________ pounds per week.  What do I need to know about calorie counting?  In order to meet your daily calorie goal, you will need to:  · Find out how many calories are in each food you would like to eat. Try to do this before you eat.  · Decide how much of the food you plan to eat.  · Write down what you ate and how many calories it had. Doing this is called  keeping a food log.    To successfully lose weight, it is important to balance calorie counting with a healthy lifestyle that includes regular activity. Aim for 150 minutes of moderate exercise (such as walking) or 75 minutes of vigorous exercise (such as running) each week.  Where do I find calorie information?    The number of calories in a food can be found on a Nutrition Facts label. If a food does not have a Nutrition Facts label, try to look up the calories online or ask your dietitian for help.  Remember that calories are listed per serving. If you choose to have more than one serving of a food, you will have to multiply the calories per serving by the amount of servings you plan to eat. For example, the label on a package of bread might say that a serving size is 1 slice and that there are 90 calories in a serving. If you eat 1 slice, you will have eaten 90 calories. If you eat 2 slices, you will have eaten 180 calories.  How do I keep a food log?  Immediately after each meal, record the following information in your food log:  · What you ate. Don't forget to include toppings, sauces, and other extras on the food.  · How much you ate. This can be measured in cups, ounces, or number of items.  · How many calories each food and drink had.  · The total number of calories in the meal.    Keep your food log near you, such as in a small notebook in your pocket, or use a mobile eduardo or website. Some programs will calculate calories for you and show you how many calories you have left for the day to meet your goal.  What are some calorie counting tips?  · Use your calories on foods and drinks that will fill you up and not leave you hungry:  ? Some examples of foods that fill you up are nuts and nut butters, vegetables, lean proteins, and high-fiber foods like whole grains. High-fiber foods are foods with more than 5 g fiber per serving.  ? Drinks such as sodas, specialty coffee drinks, alcohol, and juices have a lot  "of calories, yet do not fill you up.  · Eat nutritious foods and avoid empty calories. Empty calories are calories you get from foods or beverages that do not have many vitamins or protein, such as candy, sweets, and soda. It is better to have a nutritious high-calorie food (such as an avocado) than a food with few nutrients (such as a bag of chips).  · Know how many calories are in the foods you eat most often. This will help you calculate calorie counts faster.  · Pay attention to calories in drinks. Low-calorie drinks include water and unsweetened drinks.  · Pay attention to nutrition labels for \"low fat\" or \"fat free\" foods. These foods sometimes have the same amount of calories or more calories than the full fat versions. They also often have added sugar, starch, or salt, to make up for flavor that was removed with the fat.  · Find a way of tracking calories that works for you. Get creative. Try different apps or programs if writing down calories does not work for you.  What are some portion control tips?  · Know how many calories are in a serving. This will help you know how many servings of a certain food you can have.  · Use a measuring cup to measure serving sizes. You could also try weighing out portions on a kitchen scale. With time, you will be able to estimate serving sizes for some foods.  · Take some time to put servings of different foods on your favorite plates, bowls, and cups so you know what a serving looks like.  · Try not to eat straight from a bag or box. Doing this can lead to overeating. Put the amount you would like to eat in a cup or on a plate to make sure you are eating the right portion.  · Use smaller plates, glasses, and bowls to prevent overeating.  · Try not to multitask (for example, watch TV or use your computer) while eating. If it is time to eat, sit down at a table and enjoy your food. This will help you to know when you are full. It will also help you to be aware of what you " are eating and how much you are eating.  What are tips for following this plan?  Reading food labels  · Check the calorie count compared to the serving size. The serving size may be smaller than what you are used to eating.  · Check the source of the calories. Make sure the food you are eating is high in vitamins and protein and low in saturated and trans fats.  Shopping  · Read nutrition labels while you shop. This will help you make healthy decisions before you decide to purchase your food.  · Make a grocery list and stick to it.  Cooking  · Try to cook your favorite foods in a healthier way. For example, try baking instead of frying.  · Use low-fat dairy products.  Meal planning  · Use more fruits and vegetables. Half of your plate should be fruits and vegetables.  · Include lean proteins like poultry and fish.  How do I count calories when eating out?  · Ask for smaller portion sizes.  · Consider sharing an entree and sides instead of getting your own entree.  · If you get your own entree, eat only half. Ask for a box at the beginning of your meal and put the rest of your entree in it so you are not tempted to eat it.  · If calories are listed on the menu, choose the lower calorie options.  · Choose dishes that include vegetables, fruits, whole grains, low-fat dairy products, and lean protein.  · Choose items that are boiled, broiled, grilled, or steamed. Stay away from items that are buttered, battered, fried, or served with cream sauce. Items labeled “crispy” are usually fried, unless stated otherwise.  · Choose water, low-fat milk, unsweetened iced tea, or other drinks without added sugar. If you want an alcoholic beverage, choose a lower calorie option such as a glass of wine or light beer.  · Ask for dressings, sauces, and syrups on the side. These are usually high in calories, so you should limit the amount you eat.  · If you want a salad, choose a garden salad and ask for grilled meats. Avoid extra  toppings like holliday, cheese, or fried items. Ask for the dressing on the side, or ask for olive oil and vinegar or lemon to use as dressing.  · Estimate how many servings of a food you are given. For example, a serving of cooked rice is ½ cup or about the size of half a baseball. Knowing serving sizes will help you be aware of how much food you are eating at restaurants. The list below tells you how big or small some common portion sizes are based on everyday objects:  ? 1 oz--4 stacked dice.  ? 3 oz--1 deck of cards.  ? 1 tsp--1 die.  ? 1 Tbsp--½ a ping-pong ball.  ? 2 Tbsp--1 ping-pong ball.  ? ½ cup--½ baseball.  ? 1 cup--1 baseball.  Summary  · Calorie counting means keeping track of how many calories you eat and drink each day. If you eat fewer calories than your body needs, you should lose weight.  · A healthy amount of weight to lose per week is usually 1-2 lb (0.5-0.9 kg). This usually means reducing your daily calorie intake by 500-750 calories.  · The number of calories in a food can be found on a Nutrition Facts label. If a food does not have a Nutrition Facts label, try to look up the calories online or ask your dietitian for help.  · Use your calories on foods and drinks that will fill you up, and not on foods and drinks that will leave you hungry.  · Use smaller plates, glasses, and bowls to prevent overeating.  This information is not intended to replace advice given to you by your health care provider. Make sure you discuss any questions you have with your health care provider.  Document Released: 12/18/2006 Document Revised: 11/17/2017 Document Reviewed: 11/17/2017  [employer] Interactive Patient Education © 2019 [employer]     Exercising to Lose Weight  Exercising can help you to lose weight. In order to lose weight through exercise, you need to do vigorous-intensity exercise. You can tell that you are exercising with vigorous intensity if you are breathing very hard and fast and cannot hold a  conversation while exercising.  Moderate-intensity exercise helps to maintain your current weight. You can tell that you are exercising at a moderate level if you have a higher heart rate and faster breathing, but you are still able to hold a conversation.  How often should I exercise?  Choose an activity that you enjoy and set realistic goals. Your health care provider can help you to make an activity plan that works for you. Exercise regularly as directed by your health care provider. This may include:  · Doing resistance training twice each week, such as:  ? Push-ups.  ? Sit-ups.  ? Lifting weights.  ? Using resistance bands.  · Doing a given intensity of exercise for a given amount of time. Choose from these options:  ? 150 minutes of moderate-intensity exercise every week.  ? 75 minutes of vigorous-intensity exercise every week.  ? A mix of moderate-intensity and vigorous-intensity exercise every week.    Children, pregnant women, people who are out of shape, people who are overweight, and older adults may need to consult a health care provider for individual recommendations. If you have any sort of medical condition, be sure to consult your health care provider before starting a new exercise program.  What are some activities that can help me to lose weight?  · Walking at a rate of at least 4.5 miles an hour.  · Jogging or running at a rate of 5 miles per hour.  · Biking at a rate of at least 10 miles per hour.  · Lap swimming.  · Roller-skating or in-line skating.  · Cross-country skiing.  · Vigorous competitive sports, such as football, basketball, and soccer.  · Jumping rope.  · Aerobic dancing.  How can I be more active in my day-to-day activities?  · Use the stairs instead of the elevator.  · Take a walk during your lunch break.  · If you drive, park your car farther away from work or school.  · If you take public transportation, get off one stop early and walk the rest of the way.  · Make all of your  phone calls while standing up and walking around.  · Get up, stretch, and walk around every 30 minutes throughout the day.  What guidelines should I follow while exercising?  · Do not exercise so much that you hurt yourself, feel dizzy, or get very short of breath.  · Consult your health care provider prior to starting a new exercise program.  · Wear comfortable clothes and shoes with good support.  · Drink plenty of water while you exercise to prevent dehydration or heat stroke. Body water is lost during exercise and must be replaced.  · Work out until you breathe faster and your heart beats faster.  This information is not intended to replace advice given to you by your health care provider. Make sure you discuss any questions you have with your health care provider.  Document Released: 01/20/2012 Document Revised: 05/25/2017 Document Reviewed: 05/21/2015  [employer] Interactive Patient Education © 2019 [employer]

## 2019-04-30 NOTE — PROGRESS NOTES
Post-Discharge Transitional Care Management Services or Hospital Follow Up      Evelyn Britt   YOB: 1945    Date of Office Visit:  4/29/2019  Date of Hospital Admission: 3/25/2019  Date of Hospital Discharge: Discharge from [address] [place] [address] on 4/25/19    Care management risk score Rising risk (score 2-5) and Complex Care (Scores >=6): 0     Non face to face  following discharge, date last encounter closed (first attempt may have been earlier): 4/26/2019  5:49 PM 4/26/2019  5:49 PM    Call initiated 2 business days of discharge: Yes    Patient Active Problem List   Diagnosis    Painful intercourse    Essential hypertension    Combined hyperlipidemia    Chronic diarrhea    History of colon resection    Osteoarthritis    Hypercholesterolemia    Hypertriglyceridemia    GERD (gastroesophageal reflux disease)    Panic attack    S/P cardiac cath    Thyroid nodule    Class 2 obesity due to excess calories without serious comorbidity in adult    PND (paroxysmal nocturnal dyspnea)       Allergies   Allergen Reactions    Codeine Shortness Of Breath     Unknown       Medications listed as ordered at the time of discharge from hospital      Medications marked \"taking\" at this time  Outpatient Medications Marked as Taking for the 4/29/19 encounter (Office Visit) with Jose Lopez MD   Medication Sig Dispense Refill    oxyCODONE-acetaminophen (PERCOCET) 7.5-325 MG per tablet Take 1 tablet by mouth every 6 hours as needed for Pain.       ondansetron (ZOFRAN) 4 MG tablet Take 4 mg by mouth every 12 hours as needed for Nausea or Vomiting      candesartan (ATACAND) 32 MG tablet TAKE ONE TABLET BY MOUTH EVERY DAY 30 tablet 5    sertraline (ZOLOFT) 100 MG tablet TAKE ONE TABLET BY MOUTH EVERY DAY 30 tablet 5    propranolol (INDERAL LA) 120 MG extended release capsule TAKE ONE CAPSULE BY MOUTH EVERY DAY 30 capsule 3    esomeprazole (NEXIUM) 40 MG delayed release capsule TAKE ONE CAPSULE BY MOUTH EVERY MORNING BEFORE BREAKFAST 30 capsule 3    indapamide (LOZOL) 2.5 MG tablet TAKE ONE TABLET BY MOUTH EVERY DAY 30 tablet 3    pravastatin (PRAVACHOL) 10 MG tablet TAKE ONE TABLET BY MOUTH EVERY DAY 30 tablet 3    dicyclomine (BENTYL) 10 MG capsule Take 1 capsule by mouth 2 times daily 60 capsule 3    loteprednol-tobramycin (ZYLET) 0.5-0.3 % SUSP Apply 1 drop to eye every 4 hours (Patient taking differently: Apply 1 drop to eye every 4 hours as needed ) 5 mL 0    Cholecalciferol (VITAMIN D3) 2000 units CAPS Take by mouth      levothyroxine (SYNTHROID) 125 MCG tablet TAKE ONE TABLET BY MOUTH EVERY DAY (Patient taking differently: TAKE ONE TABLET BY MOUTH EVERY DAY  Name Brand Only) 30 tablet 0    aspirin 81 MG tablet Take 81 mg by mouth daily          Medications patient taking as of now reconciled against medications ordered at time of hospital discharge: Yes    Chief Complaint   Patient presents with    Follow-Up from Hospital     Pt was recently discharged from [facility] after Hospital stay and [facility] surgery Hindu per Dr Ellis Martinez       History of Present illness - Follow up of Hospital diagnosis(es): Patient was admitted on March 25 to [facility] after she fell at home and sustained a femur fracture as well as a nasal fracture. She had a surgery done by Dr. Ellis Martinez and it went to [facility] on 4/1/19. She was at [facility] until April 25. She was they're doing rehab. She states that she made significant strides all she was there. She states that she is now transferring on her own even though she is still nonweightbearing on her right leg. She states that she does not know why she fell at home before she went in. She states that she did not have any chest pain or palpitation she had not felt bad that day. She states that she really did not trip over anything. She states that she has never had an issue like this before. She states that she has felt fine since being discharged. She does still have some pain occasionally in that right leg. She is taking her pain medication but is only taking it usually at bedtime to help her rest.  She has seen Dr. Phillip eli since her surgery and he was pleased with her progress. She does have a follow-up appointment scheduled with him in a few weeks. Inpatient course: Discharge summary reviewed- see chart. Interval history/Current status: see above    Vitals:    04/29/19 1625   BP: 110/68   Site: Left Upper Arm   Position: Sitting   Cuff Size: Large Adult   Pulse: 64   Resp: 22   Temp: 96.3 °F (35.7 °C)   TempSrc: Temporal   SpO2: 97%   Weight: 194 lb (88 kg)   Height: 5' 3\" (1.6 m)     Body mass index is 34.37 kg/m². Wt Readings from Last 3 Encounters:   04/29/19 194 lb (88 kg)   02/06/19 215 lb (97.5 kg)   11/14/18 215 lb (97.5 kg)     BP Readings from Last 3 Encounters:   04/29/19 110/68   02/06/19 132/80   11/14/18 118/72       A comprehensive review of systems was negative except for what was noted in the HPI. Physical Exam:  General Appearance: alert and oriented to person, place and time, well-developed and well-nourished, in no acute distress  Skin: warm and dry, no rash or erythema and ecchymoses noted on face around eyes where her glasses were  Head: normocephalic and atraumatic  Pulmonary/Chest: clear to auscultation bilaterally- no wheezes, rales or rhonchi, normal air movement, no respiratory distress  Cardiovascular: normal rate, normal S1 and S2, no gallops, intact distal pulses and no carotid bruits  Abdomen: soft, non-tender, non-distended, normal bowel sounds, no masses or organomegaly  Extremities: no cyanosis and no clubbing  Musculoskeletal: normal range of motion, no joint swelling, deformity or tenderness  Neurologic: She is in a wheelchair today because she is still nonweightbearing on the right leg    Assessment/Plan:  1.  Closed fracture of right femur with routine healing, unspecified fracture morphology, unspecified portion of femur, subsequent encounter  Continue to follow with Dr. Naranjo as scheduled    2. Abnormal thyroid function test  - T4, Free  - T3  - TSH without Reflex    3. Elevated TSH  - TSH without Reflex    4. Fall, sequela    5.  Closed fracture of nasal bone with routine healing, subsequent encounter        Medical Decision Making: moderate complexity

## 2019-05-01 LAB — T3 TOTAL: 92 NG/DL (ref 80–200)

## 2019-05-13 ENCOUNTER — OFFICE VISIT (OUTPATIENT)
Dept: CARDIOLOGY | Age: 74
End: 2019-05-13
Payer: MEDICARE

## 2019-05-13 VITALS
SYSTOLIC BLOOD PRESSURE: 118 MMHG | WEIGHT: 194 LBS | HEIGHT: 63 IN | DIASTOLIC BLOOD PRESSURE: 82 MMHG | BODY MASS INDEX: 34.38 KG/M2 | HEART RATE: 62 BPM

## 2019-05-13 DIAGNOSIS — I10 ESSENTIAL HYPERTENSION: Primary | ICD-10-CM

## 2019-05-13 DIAGNOSIS — E78.1 HYPERTRIGLYCERIDEMIA: ICD-10-CM

## 2019-05-13 DIAGNOSIS — E78.00 HYPERCHOLESTEROLEMIA: ICD-10-CM

## 2019-05-13 PROCEDURE — 1036F TOBACCO NON-USER: CPT | Performed by: NURSE PRACTITIONER

## 2019-05-13 PROCEDURE — G8427 DOCREV CUR MEDS BY ELIG CLIN: HCPCS | Performed by: NURSE PRACTITIONER

## 2019-05-13 PROCEDURE — 3017F COLORECTAL CA SCREEN DOC REV: CPT | Performed by: NURSE PRACTITIONER

## 2019-05-13 PROCEDURE — 1090F PRES/ABSN URINE INCON ASSESS: CPT | Performed by: NURSE PRACTITIONER

## 2019-05-13 PROCEDURE — G8399 PT W/DXA RESULTS DOCUMENT: HCPCS | Performed by: NURSE PRACTITIONER

## 2019-05-13 PROCEDURE — G8417 CALC BMI ABV UP PARAM F/U: HCPCS | Performed by: NURSE PRACTITIONER

## 2019-05-13 PROCEDURE — 4040F PNEUMOC VAC/ADMIN/RCVD: CPT | Performed by: NURSE PRACTITIONER

## 2019-05-13 PROCEDURE — 93000 ELECTROCARDIOGRAM COMPLETE: CPT | Performed by: NURSE PRACTITIONER

## 2019-05-13 PROCEDURE — 99213 OFFICE O/P EST LOW 20 MIN: CPT | Performed by: NURSE PRACTITIONER

## 2019-05-13 PROCEDURE — 1123F ACP DISCUSS/DSCN MKR DOCD: CPT | Performed by: NURSE PRACTITIONER

## 2019-05-13 RX ORDER — HYDROCODONE BITARTRATE AND ACETAMINOPHEN 5; 325 MG/1; MG/1
1 TABLET ORAL PRN
COMMUNITY
Start: 2018-04-22 | End: 2019-05-13

## 2019-05-13 NOTE — PROGRESS NOTES
Dear Keyanna Palomares MD,    Thank you for allowing me to participate in the care of Ms. Preston Martinez. She presents today at the [address] [place] [address] in the [facility]. As you know, Ms. Adela Up is a 75 y.o. female with history of hypertension, hyperlipidemia, hypothyroidism, MARCELL-minimal on home study who presents with the chief complaint of six-month follow-up of chronic cardiac conditions. She is a patient of Dr. Jean Paul Nettles. HTN-runs well at home. HLD-on statin, PCP manages    MARCELL- sleep study 10/26/18 indicated the presence of a mild degree of MARCELL with severe oxygen desaturations. Snoring indicates resistance at the upper airway. (see media)     SOB/ PND issues have resolved. She discussed home sleep study with PCP. Not enough to treat at this time. Since last seen she has fallen (mechanical) and broke her nose as well as her femur in 3 places. She is in a wheelchair today. She otherwise denies chest pain, SOA, TAFOYA, PND, orthopnea, syncope or near syncope. She has no other complaints. Review of Systems    Constitutional: Negative for fever, chills, diaphoresis, activity change, appetite change, fatigue and unexpected weight change. Eyes: Negative for photophobia, pain, redness and visual disturbance. Respiratory: Negative for apnea, cough, chest tightness, shortness of breath, wheezing and stridor. Cardiovascular: Negative for chest pain, palpitations and leg swelling. Gastrointestinal: Negative for abdominal distention. Genitourinary: Negative for dysuria, urgency and frequency. Musculoskeletal: Negative for myalgias, arthralgias and gait problem. Skin: Negative for color change, pallor, rash and wound. Neurological: Negative for dizziness, tremors, speech difficulty, weakness and numbness. Hematological: Does not bruise/bleed easily. Psychiatric/Behavioral: Negative.         Past Medical History:   Diagnosis Date    Carpal tunnel syndrome, right     HIGH CHOLESTEROL     Hypertension     Hypothyroidism        Past Surgical History:   Procedure Laterality Date    BREAST BIOPSY Right     BREAST LUMPECTOMY  1980s    2 times    CARDIAC CATHETERIZATION  2007   [address] CATHETERIZATION  2014    CHOLECYSTECTOMY  2012    COLECTOMY      2012    COLONOSCOPY  2015    normal, due 10 years    HAND SURGERY      reconstruction, right hand    HYSTERECTOMY      total    TUBAL LIGATION      UPPER GASTROINTESTINAL ENDOSCOPY  2014    normal       Family History   Problem Relation Age of Onset    Heart Disease Mother     Heart Disease Father     Heart Disease Sister     Heart Disease Brother     Heart Disease Brother        Social History     Socioeconomic History    Marital status:      Spouse name: Not on file    Number of children: Not on file    Years of education: Not on file    Highest education level: Not on file   Occupational History    Not on file   Social Needs    Financial resource strain: Not on file    Food insecurity:     Worry: Not on file     Inability: Not on file    Transportation needs:     Medical: Not on file     Non-medical: Not on file   Tobacco Use    Smoking status: Never Smoker    Smokeless tobacco: Never Used   Substance and Sexual Activity    Alcohol use: No    Drug use: No    Sexual activity: Yes     Partners: Male   Lifestyle    Physical activity:     Days per week: Not on file     Minutes per session: Not on file    Stress: Not on file   Relationships    Social connections:     Talks on phone: Not on file     Gets together: Not on file     Attends Scientologist service: Not on file     Active member of club or organization: Not on file     Attends meetings of clubs or organizations: Not on file     Relationship status: Not on file    Intimate partner violence:     Fear of current or ex partner: Not on file     Emotionally abused: Not on file encounter: 5' 3\" (1.6 m). Weight as of this encounter: 194 lb (88 kg). Constitutional: She is oriented to person, place, and time. She appears well-developed and well-nourished in no acute distress. Head: Normocephalic and atraumatic. Neck:  Neck supple without JVD present. Cardiovascular: Normal rate, regular rhythm, normal heart sounds. no murmur ascultated. No gallop and no friction rub.  no carotid bruits. no peripheral edema. Pulmonary/Chest:  Lungs clear to auscultation bilaterally without evidence of respiratory distress. She without wheezes. She without rales or ronchi. Musculoskeletal: Normal range of motion. Gait is not assessed, in wheelchair. Neurological: She is alert and oriented to person, place, and time. Skin: Skin is warm and dry without rash or pallor. Psychiatric: She has a normal mood and affect. Her behavior is normal. Thought content normal.     Lab Results   Component Value Date    CREATININE 1.0 02/06/2019    CREATININE 1.1 08/08/2018    CREATININE 1.1 08/01/2018    HGB 12.4 02/06/2019    HGB 13.2 08/01/2018     EKG  NSR 62 BPM    Assessment    1. Essential hypertension    2. Hypercholesterolemia    3. Hypertriglyceridemia          Plan:    HTN-controlled, no change   HLD-on statin, pcp manages  MARCELL- sleep study 10/26/18 indicated the presence of a mild degree of OS A with severe oxygen desaturations. Snoring indicates resistance at the upper airway. (see media)     Disposition - RTC in 12 months with Dr. Genell Cheadle  or sooner if needed    Please do not hesitate to contact me for any questions or concerns.     Sincerely yours,    Abdias Kumar, APRN

## 2019-06-12 DIAGNOSIS — K58.0 IRRITABLE BOWEL SYNDROME WITH DIARRHEA: ICD-10-CM

## 2019-06-12 RX ORDER — DIPHENOXYLATE HYDROCHLORIDE AND ATROPINE SULFATE 2.5; .025 MG/1; MG/1
TABLET ORAL
Qty: 30 TABLET | Refills: 2 | Status: SHIPPED | OUTPATIENT
Start: 2019-06-12 | End: 2019-10-16 | Stop reason: SDUPTHER

## 2019-06-25 ENCOUNTER — PATIENT MESSAGE (OUTPATIENT)
Dept: PRIMARY CARE CLINIC | Age: 74
End: 2019-06-25

## 2019-06-25 RX ORDER — ONDANSETRON 4 MG/1
4 TABLET, FILM COATED ORAL 3 TIMES DAILY PRN
Qty: 60 TABLET | Refills: 2 | Status: SHIPPED | OUTPATIENT
Start: 2019-06-25 | End: 2020-02-11

## 2019-07-05 RX ORDER — DICYCLOMINE HYDROCHLORIDE 10 MG/1
CAPSULE ORAL
Qty: 60 CAPSULE | Refills: 3 | Status: SHIPPED | OUTPATIENT
Start: 2019-07-05 | End: 2019-10-16 | Stop reason: SDUPTHER

## 2019-08-12 ENCOUNTER — OFFICE VISIT (OUTPATIENT)
Dept: PRIMARY CARE CLINIC | Age: 74
End: 2019-08-12
Payer: MEDICARE

## 2019-08-12 VITALS
SYSTOLIC BLOOD PRESSURE: 112 MMHG | RESPIRATION RATE: 16 BRPM | HEART RATE: 62 BPM | TEMPERATURE: 96.7 F | DIASTOLIC BLOOD PRESSURE: 70 MMHG | OXYGEN SATURATION: 98 %

## 2019-08-12 DIAGNOSIS — I10 ESSENTIAL HYPERTENSION: ICD-10-CM

## 2019-08-12 DIAGNOSIS — Z12.31 ENCOUNTER FOR SCREENING MAMMOGRAM FOR BREAST CANCER: Primary | ICD-10-CM
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
## 2019-08-12 DIAGNOSIS — E03.9 ACQUIRED HYPOTHYROIDISM: ICD-10-CM

## 2019-08-12 DIAGNOSIS — S72.91XD CLOSED FRACTURE OF RIGHT FEMUR WITH ROUTINE HEALING, UNSPECIFIED FRACTURE MORPHOLOGY, UNSPECIFIED PORTION OF FEMUR, SUBSEQUENT ENCOUNTER: ICD-10-CM

## 2019-08-12 PROCEDURE — 1036F TOBACCO NON-USER: CPT | Performed by: FAMILY MEDICINE

## 2019-08-12 PROCEDURE — G8427 DOCREV CUR MEDS BY ELIG CLIN: HCPCS | Performed by: FAMILY MEDICINE

## 2019-08-12 PROCEDURE — G8417 CALC BMI ABV UP PARAM F/U: HCPCS | Performed by: FAMILY MEDICINE

## 2019-08-12 PROCEDURE — 1090F PRES/ABSN URINE INCON ASSESS: CPT | Performed by: FAMILY MEDICINE

## 2019-08-12 PROCEDURE — 3017F COLORECTAL CA SCREEN DOC REV: CPT | Performed by: FAMILY MEDICINE

## 2019-08-12 PROCEDURE — G8399 PT W/DXA RESULTS DOCUMENT: HCPCS | Performed by: FAMILY MEDICINE

## 2019-08-12 PROCEDURE — 99214 OFFICE O/P EST MOD 30 MIN: CPT | Performed by: FAMILY MEDICINE

## 2019-08-12 PROCEDURE — 4040F PNEUMOC VAC/ADMIN/RCVD: CPT | Performed by: FAMILY MEDICINE

## 2019-08-12 PROCEDURE — 1123F ACP DISCUSS/DSCN MKR DOCD: CPT | Performed by: FAMILY MEDICINE

## 2019-08-13 ASSESSMENT — ENCOUNTER SYMPTOMS
COUGH: 0
DIARRHEA: 0
NAUSEA: 0
COLOR CHANGE: 0
WHEEZING: 0
BACK PAIN: 0
VOMITING: 0
ABDOMINAL PAIN: 0
EYE DISCHARGE: 0

## 2019-08-13 NOTE — PROGRESS NOTES
TABLET BY MOUTH EVERY DAY 30 tablet 3    Cholecalciferol (VITAMIN D3) 2000 units CAPS Take by mouth      levothyroxine (SYNTHROID) 125 MCG tablet TAKE ONE TABLET BY MOUTH EVERY DAY (Patient taking differently: Take by mouth Daily Takes 1/2 daily) 30 tablet 0    aspirin 81 MG tablet Take 81 mg by mouth daily       No current facility-administered medications for this visit. Allergies   Allergen Reactions    Codeine Shortness Of Breath     Unknown       Review of Systems   Constitutional: Negative for activity change, appetite change and fever. HENT: Negative for congestion and nosebleeds. Eyes: Negative for discharge. Respiratory: Negative for cough and wheezing. Cardiovascular: Negative for chest pain and leg swelling. Gastrointestinal: Negative for abdominal pain, diarrhea, nausea and vomiting. Genitourinary: Negative for difficulty urinating, frequency and urgency. Musculoskeletal: Positive for arthralgias and gait problem (due to previous femur fracture). Negative for back pain. Skin: Negative for color change and rash. Neurological: Negative for dizziness and headaches. Hematological: Does not bruise/bleed easily. Psychiatric/Behavioral: Negative for sleep disturbance and suicidal ideas. OBJECTIVE:     Physical Exam   Constitutional: She is oriented to person, place, and time. She appears well-developed. No distress. HENT:   Head: Normocephalic and atraumatic. Neck: Normal range of motion. Neck supple. Cardiovascular: Normal rate, regular rhythm and normal heart sounds. No murmur heard. Pulmonary/Chest: Effort normal and breath sounds normal. No respiratory distress. Neurological: She is alert and oriented to person, place, and time. Skin: Skin is warm and dry. She is not diaphoretic. Psychiatric: She has a normal mood and affect.  Her behavior is normal. Judgment and thought content normal.      /70 (Site: Left Upper Arm, Position: Sitting, Cuff Size:

## 2019-09-16 ENCOUNTER — OFFICE VISIT (OUTPATIENT)
Dept: PRIMARY CARE CLINIC | Age: 74
End: 2019-09-16
Payer: MEDICARE

## 2019-09-16 VITALS
HEART RATE: 67 BPM | SYSTOLIC BLOOD PRESSURE: 134 MMHG | WEIGHT: 206.8 LBS | HEIGHT: 64 IN | RESPIRATION RATE: 16 BRPM | OXYGEN SATURATION: 97 % | DIASTOLIC BLOOD PRESSURE: 70 MMHG | BODY MASS INDEX: 35.3 KG/M2 | TEMPERATURE: 97.6 F

## 2019-09-16 DIAGNOSIS — N63.11 MASS OF UPPER OUTER QUADRANT OF RIGHT BREAST: ICD-10-CM

## 2019-09-16 DIAGNOSIS — N63.10 LUMP OF BREAST, RIGHT: Primary | ICD-10-CM

## 2019-09-16 DIAGNOSIS — R19.7 DIARRHEA, UNSPECIFIED TYPE: ICD-10-CM

## 2019-09-16 PROCEDURE — 1036F TOBACCO NON-USER: CPT | Performed by: FAMILY MEDICINE

## 2019-09-16 PROCEDURE — G8427 DOCREV CUR MEDS BY ELIG CLIN: HCPCS | Performed by: FAMILY MEDICINE

## 2019-09-16 PROCEDURE — G8417 CALC BMI ABV UP PARAM F/U: HCPCS | Performed by: FAMILY MEDICINE

## 2019-09-16 PROCEDURE — 3017F COLORECTAL CA SCREEN DOC REV: CPT | Performed by: FAMILY MEDICINE

## 2019-09-16 PROCEDURE — 1090F PRES/ABSN URINE INCON ASSESS: CPT | Performed by: FAMILY MEDICINE

## 2019-09-16 PROCEDURE — 99214 OFFICE O/P EST MOD 30 MIN: CPT | Performed by: FAMILY MEDICINE

## 2019-09-16 PROCEDURE — 4040F PNEUMOC VAC/ADMIN/RCVD: CPT | Performed by: FAMILY MEDICINE

## 2019-09-16 PROCEDURE — 1123F ACP DISCUSS/DSCN MKR DOCD: CPT | Performed by: FAMILY MEDICINE

## 2019-09-16 PROCEDURE — G8399 PT W/DXA RESULTS DOCUMENT: HCPCS | Performed by: FAMILY MEDICINE

## 2019-09-18 ASSESSMENT — ENCOUNTER SYMPTOMS
NAUSEA: 0
COUGH: 0
EYE DISCHARGE: 0
BACK PAIN: 0
ABDOMINAL PAIN: 0
WHEEZING: 0
VOMITING: 0
COLOR CHANGE: 0
DIARRHEA: 1

## 2019-10-02 RX ORDER — INDAPAMIDE 2.5 MG/1
TABLET, FILM COATED ORAL
Qty: 30 TABLET | Refills: 3 | Status: SHIPPED | OUTPATIENT
Start: 2019-10-02 | End: 2020-01-30

## 2019-10-02 RX ORDER — PRAVASTATIN SODIUM 10 MG
TABLET ORAL
Qty: 30 TABLET | Refills: 3 | Status: SHIPPED | OUTPATIENT
Start: 2019-10-02 | End: 2020-01-30

## 2019-10-02 RX ORDER — ESOMEPRAZOLE MAGNESIUM 40 MG/1
CAPSULE, DELAYED RELEASE ORAL
Qty: 30 CAPSULE | Refills: 3 | Status: SHIPPED | OUTPATIENT
Start: 2019-10-02 | End: 2020-01-30

## 2019-10-02 RX ORDER — PROPRANOLOL HYDROCHLORIDE 120 MG/1
CAPSULE, EXTENDED RELEASE ORAL
Qty: 30 CAPSULE | Refills: 3 | Status: SHIPPED | OUTPATIENT
Start: 2019-10-02 | End: 2020-01-30

## 2019-10-07 ENCOUNTER — PATIENT MESSAGE (OUTPATIENT)
Dept: PRIMARY CARE CLINIC | Age: 74
End: 2019-10-07

## 2019-10-07 RX ORDER — PROPRANOLOL HYDROCHLORIDE 120 MG/1
CAPSULE, EXTENDED RELEASE ORAL
Qty: 30 CAPSULE | Refills: 3 | Status: SHIPPED | OUTPATIENT
Start: 2019-10-07 | End: 2020-01-30

## 2019-10-07 RX ORDER — VALACYCLOVIR HYDROCHLORIDE 1 G/1
TABLET, FILM COATED ORAL
Qty: 8 TABLET | Refills: 0 | Status: SHIPPED | OUTPATIENT
Start: 2019-10-07 | End: 2019-11-04 | Stop reason: SDUPTHER

## 2019-10-07 RX ORDER — PRAVASTATIN SODIUM 10 MG
TABLET ORAL
Qty: 30 TABLET | Refills: 3 | Status: SHIPPED | OUTPATIENT
Start: 2019-10-07 | End: 2020-01-30

## 2019-10-07 RX ORDER — INDAPAMIDE 2.5 MG/1
TABLET, FILM COATED ORAL
Qty: 30 TABLET | Refills: 3 | Status: SHIPPED | OUTPATIENT
Start: 2019-10-07 | End: 2020-01-30

## 2019-10-07 RX ORDER — ESOMEPRAZOLE MAGNESIUM 40 MG/1
CAPSULE, DELAYED RELEASE ORAL
Qty: 30 CAPSULE | Refills: 3 | Status: SHIPPED | OUTPATIENT
Start: 2019-10-07 | End: 2020-01-30

## 2019-10-16 ENCOUNTER — OFFICE VISIT (OUTPATIENT)
Dept: PRIMARY CARE CLINIC | Age: 74
End: 2019-10-16
Payer: MEDICARE

## 2019-10-16 VITALS
HEART RATE: 59 BPM | TEMPERATURE: 97.2 F | DIASTOLIC BLOOD PRESSURE: 61 MMHG | WEIGHT: 201 LBS | BODY MASS INDEX: 35.61 KG/M2 | SYSTOLIC BLOOD PRESSURE: 113 MMHG | OXYGEN SATURATION: 96 % | HEIGHT: 63 IN

## 2019-10-16 DIAGNOSIS — E03.9 ACQUIRED HYPOTHYROIDISM: Primary | ICD-10-CM

## 2019-10-16 DIAGNOSIS — Z23 NEEDS FLU SHOT: ICD-10-CM

## 2019-10-16 DIAGNOSIS — Z00.00 ROUTINE GENERAL MEDICAL EXAMINATION AT A HEALTH CARE FACILITY: ICD-10-CM

## 2019-10-16 DIAGNOSIS — K58.0 IRRITABLE BOWEL SYNDROME WITH DIARRHEA: ICD-10-CM

## 2019-10-16 LAB
T4 FREE: 1.3 NG/DL (ref 0.9–1.7)
TSH SERPL DL<=0.05 MIU/L-ACNC: 6.81 UIU/ML (ref 0.27–4.2)

## 2019-10-16 PROCEDURE — 90653 IIV ADJUVANT VACCINE IM: CPT | Performed by: FAMILY MEDICINE

## 2019-10-16 PROCEDURE — G8482 FLU IMMUNIZE ORDER/ADMIN: HCPCS | Performed by: FAMILY MEDICINE

## 2019-10-16 PROCEDURE — 4040F PNEUMOC VAC/ADMIN/RCVD: CPT | Performed by: FAMILY MEDICINE

## 2019-10-16 PROCEDURE — 1123F ACP DISCUSS/DSCN MKR DOCD: CPT | Performed by: FAMILY MEDICINE

## 2019-10-16 PROCEDURE — G0439 PPPS, SUBSEQ VISIT: HCPCS | Performed by: FAMILY MEDICINE

## 2019-10-16 PROCEDURE — 36415 COLL VENOUS BLD VENIPUNCTURE: CPT | Performed by: FAMILY MEDICINE

## 2019-10-16 PROCEDURE — G0008 ADMIN INFLUENZA VIRUS VAC: HCPCS | Performed by: FAMILY MEDICINE

## 2019-10-16 PROCEDURE — 3017F COLORECTAL CA SCREEN DOC REV: CPT | Performed by: FAMILY MEDICINE

## 2019-10-16 RX ORDER — DICYCLOMINE HYDROCHLORIDE 10 MG/1
10 CAPSULE ORAL 2 TIMES DAILY
Qty: 60 CAPSULE | Refills: 3 | Status: SHIPPED | OUTPATIENT
Start: 2019-10-16 | End: 2019-11-16 | Stop reason: SDUPTHER

## 2019-10-16 RX ORDER — DIPHENOXYLATE HYDROCHLORIDE AND ATROPINE SULFATE 2.5; .025 MG/1; MG/1
1 TABLET ORAL 4 TIMES DAILY PRN
Qty: 30 TABLET | Refills: 2 | Status: SHIPPED | OUTPATIENT
Start: 2019-10-16 | End: 2020-02-26

## 2019-10-16 ASSESSMENT — PATIENT HEALTH QUESTIONNAIRE - PHQ9
SUM OF ALL RESPONSES TO PHQ QUESTIONS 1-9: 0
SUM OF ALL RESPONSES TO PHQ QUESTIONS 1-9: 0

## 2019-10-16 ASSESSMENT — LIFESTYLE VARIABLES: HOW OFTEN DO YOU HAVE A DRINK CONTAINING ALCOHOL: 0

## 2019-10-18 LAB — T3 TOTAL: 81 NG/DL (ref 80–200)

## 2019-10-21 DIAGNOSIS — N63.10 LUMP OF BREAST, RIGHT: ICD-10-CM

## 2019-10-21 DIAGNOSIS — R92.8 OTHER ABNORMAL AND INCONCLUSIVE FINDINGS ON DIAGNOSTIC IMAGING OF BREAST: ICD-10-CM

## 2019-10-21 DIAGNOSIS — N63.11 MASS OF UPPER OUTER QUADRANT OF RIGHT BREAST: ICD-10-CM

## 2019-10-21 DIAGNOSIS — N63.10 LUMP OF RIGHT BREAST: Primary | ICD-10-CM

## 2019-10-21 DIAGNOSIS — N63.10 LUMP OF RIGHT BREAST: ICD-10-CM

## 2019-11-04 RX ORDER — VALACYCLOVIR HYDROCHLORIDE 1 G/1
TABLET, FILM COATED ORAL
Qty: 8 TABLET | Refills: 1 | Status: SHIPPED | OUTPATIENT
Start: 2019-11-04 | End: 2020-02-11

## 2019-11-04 RX ORDER — CANDESARTAN 32 MG/1
TABLET ORAL
Qty: 30 TABLET | Refills: 5 | Status: SHIPPED | OUTPATIENT
Start: 2019-11-04 | End: 2020-04-27

## 2019-11-18 RX ORDER — DICYCLOMINE HYDROCHLORIDE 10 MG/1
10 CAPSULE ORAL 2 TIMES DAILY
Qty: 60 CAPSULE | Refills: 3 | Status: SHIPPED | OUTPATIENT
Start: 2019-11-18 | End: 2020-03-31 | Stop reason: SDUPTHER

## 2019-12-02 RX ORDER — SERTRALINE HYDROCHLORIDE 100 MG/1
TABLET, FILM COATED ORAL
Qty: 30 TABLET | Refills: 5 | Status: SHIPPED | OUTPATIENT
Start: 2019-12-02 | End: 2019-12-04

## 2019-12-04 RX ORDER — SERTRALINE HYDROCHLORIDE 100 MG/1
TABLET, FILM COATED ORAL
Qty: 30 TABLET | Refills: 5 | Status: SHIPPED | OUTPATIENT
Start: 2019-12-04 | End: 2020-12-01

## 2020-01-08 NOTE — PROGRESS NOTES
YOB: 1945  Location: [facility]  Location Address: [address] [place] [address], [facility], [address] [place] [address]  Location Phone: [phone]    Chief Complaint   Patient presents with   • Follow-up       History of Present Illness  Vanessa Kamara is a 73 y.o. female.  Vanessa Kamara is here for evaluation of ENT complaints. The patient has had problems with deviated septum.  The symptoms are localized to the bilateral nose. The patient has had stable symptoms. The symptoms have been present for the last 10 months. There have been no identified factors that aggravate the symptoms. There have been no factors that have improved the symptoms. Patient states she had a fall on 3/25/19 and present to the ER with facial trauma and broken leg. She states they proceeded with a maxillofacial CT and she was told that she had a deviated septum but nothing was followed up on. She had orthopaedic surgery by Dr. Paredes on her right leg. Dr. Paredes referred her for evaluation of her nasal complaints. The patient reports that she has been having dizziness when she rolls over in bed and when she gets up.    Study Result      CT MAXILLOFACIAL WO CONT- 3/25/2019 2:48 PM CDT     HISTORY: ] Bruising after a fall. Laceration in the frontal scalp.      COMPARISON: None      DOSE LENGTH PRODUCT: 617 mGy cm. Automated exposure control was also  utilized to decrease patient radiation dose.     TECHNIQUE: Serial helical tomographic images of the facial bones were  obtained without contrast. Multiplanar reformatted images were provided  for review.      FINDINGS:   Orbits: Intact.     Sinuses: There is complete opacification of the RIGHT sphenoid sinus.  Mucosal thickening is present in the inferior RIGHT maxillary sinus.     Nasal septum: Marked deviation to the LEFT with a leftward bony spur.     Temporal bones: No fractures. Mastoid air cells are clear.     Temporomandibular joints: Degenerative changes are present.     Mandible:  Intact.     Soft tissues: A RIGHT frontal bruise or laceration is present.      Other: No other fractures identified.     IMPRESSION:  1. No evidence of acute facial bone injury.   2. Sinus disease and small frontal laceration or small hematoma.        This report was finalized on 03/25/2019 15:18 by Dr. Lele Gautam MD      Study Result      CT HEAD WO CONTRAST- 3/25/2019 2:48 PM CDT     HISTORY: Fall, forehead trauma. Left eye bruising.     COMPARISON: None      DOSE LENGTH PRODUCT: 617 mGy cm. Automated exposure control was also  utilized to decrease patient radiation dose.     TECHNIQUE: Helical tomographic images of the brain were obtained without  the use of intravenous contrast.      FINDINGS:   Hemorrhage: None.     Mass effect: No midline shift or mass effect.     Ventricles: Normal and symmetric without hydrocephalus.     Basilar cisterns: Normal.     Sulci: Normal in configuration. No sulcal effacement.     Extra-axial fluid: No abnormal extra-axial fluid collections.     Grey and white matter differentiation: Calcifications are seen in the  basal ganglia. Gray and white matter differentiation is maintained.     Posterior fossa and brainstem: Normal.     Bones: No fractures or lesions.     Soft tissues: A frontal scalp laceration is present.     Sinuses and mastoid air cells: There is opacification of the RIGHT  sphenoid sinus.     IMPRESSION:  1. RIGHT frontal laceration without evidence of acute intracranial  process.        This report was finalized on 03/25/2019 15:09 by Dr. Lele Gautam MD                                 Past Medical History:   Diagnosis Date   • Disease of thyroid gland    • Elevated lipids    • Fracture, femur (CMS/HCC)     Right   • GERD (gastroesophageal reflux disease)    • Hiatal hernia    • Hypertension    • Osteoporosis        Past Surgical History:   Procedure Laterality Date   • ANCHOVY PROCEDURE     • BREAST BIOPSY      Benign breast tumors   • CHOLECYSTECTOMY     •  COLON RESECTION     • FEMUR OPEN REDUCTION INTERNAL FIXATION Right 3/26/2019    Procedure: OPEN REDUCTION AND INTERNAL FIXATION-FEMUR;  Surgeon: Tu Paredes MD;  Location: [facility] OR;  Service: Orthopedics   • HAND SURGERY Right    • HYSTERECTOMY         Outpatient Medications Marked as Taking for the 1/9/20 encounter (Office Visit) with Ernesto Rosas MD   Medication Sig Dispense Refill   • aspirin 81 MG chewable tablet Chew 81 mg Daily.     • candesartan-hydrochlorothiazide (ATACAND HCT) 32-12.5 MG per tablet Take 1 tablet by mouth Daily.     • cholecalciferol (VITAMIN D3) 1000 units tablet Take 2,000 Units by mouth Daily.     • dicyclomine (BENTYL) 10 MG capsule Take 10 mg by mouth 2 (Two) Times a Day.     • diphenoxylate-atropine (LOMOTIL) 2.5-0.025 MG per tablet Take 1 tablet by mouth 4 (Four) Times a Day As Needed for Diarrhea.     • esomeprazole (nexIUM) 40 MG capsule Take 40 mg by mouth Every Morning Before Breakfast.     • indapamide (LOZOL) 2.5 MG tablet Take 2.5 mg by mouth Daily.     • levothyroxine (SYNTHROID, LEVOTHROID) 125 MCG tablet Take 62.5 mcg by mouth Daily.     • ondansetron (ZOFRAN) 4 MG tablet      • pravastatin (PRAVACHOL) 10 MG tablet Take 10 mg by mouth Daily.     • propranolol LA (INDERAL LA) 120 MG 24 hr capsule Take 120 mg by mouth Daily.     • sertraline (ZOLOFT) 100 MG tablet Take 100 mg by mouth Daily.         Codeine    Family History   Problem Relation Age of Onset   • Heart failure Mother    • Hypertension Mother    • Heart failure Father    • Hypertension Father        Social History     Socioeconomic History   • Marital status:      Spouse name: Not on file   • Number of children: Not on file   • Years of education: Not on file   • Highest education level: Not on file   Tobacco Use   • Smoking status: Never Smoker   • Smokeless tobacco: Never Used   Substance and Sexual Activity   • Alcohol use: No     Frequency: Never   • Drug use: No   • Sexual activity:  Defer       Review of Systems   Constitutional: Negative for activity change, appetite change, chills, diaphoresis, fatigue, fever and unexpected weight change.   HENT: Positive for congestion. Negative for dental problem, drooling, ear discharge, ear pain, facial swelling, hearing loss, mouth sores, nosebleeds, postnasal drip, rhinorrhea, sinus pressure, sneezing, sore throat, tinnitus, trouble swallowing and voice change.    Eyes: Negative.    Respiratory: Negative.    Cardiovascular: Negative.    Gastrointestinal: Negative.    Endocrine: Negative.    Skin: Negative.    Allergic/Immunologic: Negative for environmental allergies, food allergies and immunocompromised state.   Neurological: Positive for dizziness.   Hematological: Negative.    Psychiatric/Behavioral: Negative.        Vitals:    01/09/20 1046   BP: 152/75   Pulse: 61   Temp: 98.3 °F (36.8 °C)       Body mass index is 36.21 kg/m².    Objective     Physical Exam  CONSTITUTIONAL: well nourished, alert, oriented, in no acute distress     COMMUNICATION AND VOICE: able to communicate normally, normal voice quality    HEAD: normocephalic, no lesions, atraumatic, no tenderness, no masses     FACE: appearance normal, no lesions, no tenderness, no deformities, facial motion symmetric    SALIVARY GLANDS: parotid glands with no tenderness, no swelling, no masses, submandibular glands with normal size, nontender    EYES: ocular motility normal, eyelids normal, orbits normal, no proptosis, conjunctiva normal , pupils equal, round     EARS:  Hearing: response to conversational voice normal bilaterally   External Ears: auricles without lesions  Otoscopic: tympanic membrane appearance normal, no lesions, no perforation, normal mobility, no fluid    NOSE:  External Nose: structure normal, no tenderness on palpation, no nasal discharge, no lesions, no evidence of trauma, nostrils patent   Intranasal Exam: nasal mucosa normal, vestibule within normal limits, inferior  turbinate normal, nasal septum deviated     ORAL:  Lips: upper and lower lips without lesion   Teeth: dentition within normal limits for age   Gums: gingivae healthy   Oral Mucosa: oral mucosa normal, no mucosal lesions   Floor of Mouth: Warthin’s duct patent, mucosa normal  Tongue: lingual mucosa normal without lesions, normal tongue mobility   Palate: soft and hard palates with normal mucosa and structure  Oropharynx: oropharyngeal mucosa normal    NECK: neck appearance normal, no mass,  noted without erythema or tenderness    THYROID: no overt thyromegaly, no tenderness, nodules or mass present on palpation, position midline     LYMPH NODES: no lymphadenopathy    CHEST/RESPIRATORY: respiratory effort normal, normal breath sounds     CARDIOVASCULAR: rate and rhythm normal, extremities without cyanosis or edema      NEUROLOGIC/PSYCHIATRIC: oriented to time, place and person, mood normal, affect appropriate, CN II-XII intact grossly    Assessment/Plan   Vanessa was seen today for follow-up.    Diagnoses and all orders for this visit:    H/O closed fracture of nasal bones    Acquired deviated nasal septum    Dizziness  Comments:  possible hypotensive      * Surgery not found *  No orders of the defined types were placed in this encounter.    Return if symptoms worsen or fail to improve.       Patient Instructions   Continue treatment as directed, if symptoms develop call for sooner appointment, otherwise follow-up as directed.    The patient understands and agrees with assessment and plan.    Recommend further evaluation of dizziness with PCP.

## 2020-01-09 ENCOUNTER — OFFICE VISIT (OUTPATIENT)
Dept: OTOLARYNGOLOGY | Facility: CLINIC | Age: 75
End: 2020-01-09

## 2020-01-09 VITALS
BODY MASS INDEX: 35.85 KG/M2 | HEIGHT: 64 IN | WEIGHT: 210 LBS | HEART RATE: 61 BPM | TEMPERATURE: 98.3 F | DIASTOLIC BLOOD PRESSURE: 75 MMHG | SYSTOLIC BLOOD PRESSURE: 152 MMHG

## 2020-01-09 DIAGNOSIS — J34.2 ACQUIRED DEVIATED NASAL SEPTUM: ICD-10-CM

## 2020-01-09 DIAGNOSIS — Z87.81 H/O CLOSED FRACTURE OF NASAL BONES: Primary | ICD-10-CM

## 2020-01-09 DIAGNOSIS — R42 DIZZINESS: ICD-10-CM

## 2020-01-09 PROCEDURE — 99213 OFFICE O/P EST LOW 20 MIN: CPT | Performed by: PHYSICIAN ASSISTANT

## 2020-01-09 NOTE — PATIENT INSTRUCTIONS
Continue treatment as directed, if symptoms develop call for sooner appointment, otherwise follow-up as directed.    The patient understands and agrees with assessment and plan.    Recommend further evaluation of dizziness with PCP.    Sabino-Hallpike test negative.

## 2020-01-30 RX ORDER — PRAVASTATIN SODIUM 10 MG
TABLET ORAL
Qty: 30 TABLET | Refills: 3 | Status: SHIPPED | OUTPATIENT
Start: 2020-01-30 | End: 2020-09-22

## 2020-01-30 RX ORDER — PROPRANOLOL HYDROCHLORIDE 120 MG/1
CAPSULE, EXTENDED RELEASE ORAL
Qty: 30 CAPSULE | Refills: 3 | Status: SHIPPED | OUTPATIENT
Start: 2020-01-30 | End: 2020-09-22

## 2020-01-30 RX ORDER — ESOMEPRAZOLE MAGNESIUM 40 MG/1
CAPSULE, DELAYED RELEASE ORAL
Qty: 30 CAPSULE | Refills: 3 | Status: SHIPPED | OUTPATIENT
Start: 2020-01-30 | End: 2020-09-22

## 2020-01-30 RX ORDER — INDAPAMIDE 2.5 MG/1
TABLET, FILM COATED ORAL
Qty: 30 TABLET | Refills: 3 | Status: SHIPPED | OUTPATIENT
Start: 2020-01-30 | End: 2020-09-22

## 2020-02-11 ENCOUNTER — OFFICE VISIT (OUTPATIENT)
Dept: PRIMARY CARE CLINIC | Age: 75
End: 2020-02-11
Payer: MEDICARE

## 2020-02-11 VITALS
DIASTOLIC BLOOD PRESSURE: 74 MMHG | HEART RATE: 61 BPM | SYSTOLIC BLOOD PRESSURE: 130 MMHG | WEIGHT: 213 LBS | OXYGEN SATURATION: 96 % | RESPIRATION RATE: 22 BRPM | HEIGHT: 63 IN | BODY MASS INDEX: 37.74 KG/M2 | TEMPERATURE: 98 F

## 2020-02-11 LAB
BILIRUBIN, POC: NORMAL
BLOOD URINE, POC: NORMAL
CLARITY, POC: CLEAR
COLOR, POC: YELLOW
GLUCOSE URINE, POC: NORMAL
KETONES, POC: NORMAL
LEUKOCYTE EST, POC: NORMAL
NITRITE, POC: NORMAL
PH, POC: 5
PROTEIN, POC: NORMAL
SPECIFIC GRAVITY, POC: 1.01
UROBILINOGEN, POC: 0.2

## 2020-02-11 PROCEDURE — G8427 DOCREV CUR MEDS BY ELIG CLIN: HCPCS | Performed by: FAMILY MEDICINE

## 2020-02-11 PROCEDURE — 0509F URINE INCON PLAN DOCD: CPT | Performed by: FAMILY MEDICINE

## 2020-02-11 PROCEDURE — 99214 OFFICE O/P EST MOD 30 MIN: CPT | Performed by: FAMILY MEDICINE

## 2020-02-11 PROCEDURE — G8417 CALC BMI ABV UP PARAM F/U: HCPCS | Performed by: FAMILY MEDICINE

## 2020-02-11 PROCEDURE — 81002 URINALYSIS NONAUTO W/O SCOPE: CPT | Performed by: FAMILY MEDICINE

## 2020-02-11 PROCEDURE — 1123F ACP DISCUSS/DSCN MKR DOCD: CPT | Performed by: FAMILY MEDICINE

## 2020-02-11 PROCEDURE — G8482 FLU IMMUNIZE ORDER/ADMIN: HCPCS | Performed by: FAMILY MEDICINE

## 2020-02-11 PROCEDURE — 1090F PRES/ABSN URINE INCON ASSESS: CPT | Performed by: FAMILY MEDICINE

## 2020-02-11 PROCEDURE — 3017F COLORECTAL CA SCREEN DOC REV: CPT | Performed by: FAMILY MEDICINE

## 2020-02-11 PROCEDURE — 4040F PNEUMOC VAC/ADMIN/RCVD: CPT | Performed by: FAMILY MEDICINE

## 2020-02-11 PROCEDURE — 1036F TOBACCO NON-USER: CPT | Performed by: FAMILY MEDICINE

## 2020-02-11 PROCEDURE — G8399 PT W/DXA RESULTS DOCUMENT: HCPCS | Performed by: FAMILY MEDICINE

## 2020-02-11 RX ORDER — LEVOTHYROXINE SODIUM 88 UG/1
88 TABLET ORAL DAILY
COMMUNITY

## 2020-02-11 ASSESSMENT — PATIENT HEALTH QUESTIONNAIRE - PHQ9
1. LITTLE INTEREST OR PLEASURE IN DOING THINGS: 0
SUM OF ALL RESPONSES TO PHQ QUESTIONS 1-9: 0
SUM OF ALL RESPONSES TO PHQ QUESTIONS 1-9: 0
2. FEELING DOWN, DEPRESSED OR HOPELESS: 0
SUM OF ALL RESPONSES TO PHQ9 QUESTIONS 1 & 2: 0

## 2020-02-12 ASSESSMENT — ENCOUNTER SYMPTOMS
EYE DISCHARGE: 0
VOMITING: 0
WHEEZING: 0
ABDOMINAL PAIN: 0
BACK PAIN: 0
NAUSEA: 0
COLOR CHANGE: 0
DIARRHEA: 0
COUGH: 0

## 2020-02-12 NOTE — PROGRESS NOTES
ONE CAPSULE BY MOUTH EVERY DAY 30 capsule 3    indapamide (LOZOL) 2.5 MG tablet TAKE ONE TABLET BY MOUTH EVERY DAY 30 tablet 3    pravastatin (PRAVACHOL) 10 MG tablet TAKE ONE TABLET BY MOUTH EVERY DAY 30 tablet 3    sertraline (ZOLOFT) 100 MG tablet TAKE ONE TABLET BY MOUTH EVERY DAY 30 tablet 5    dicyclomine (BENTYL) 10 MG capsule Take 1 capsule by mouth 2 times daily 60 capsule 3    candesartan (ATACAND) 32 MG tablet TAKE ONE TABLET BY MOUTH EVERY DAY 30 tablet 5    Magic Mouthwash (MIRACLE MOUTHWASH) Swish and spit 5 mLs 4 times daily as needed for Irritation 240 mL 1    Cholecalciferol (VITAMIN D3) 2000 units CAPS Take by mouth      aspirin 81 MG tablet Take 81 mg by mouth daily       No current facility-administered medications for this visit. Allergies   Allergen Reactions    Codeine Shortness Of Breath     Unknown       Review of Systems   Constitutional: Negative for activity change, appetite change and fever. HENT: Negative for congestion and nosebleeds. Eyes: Negative for discharge. Respiratory: Negative for cough and wheezing. Cardiovascular: Negative for chest pain and leg swelling. Gastrointestinal: Negative for abdominal pain, diarrhea, nausea and vomiting. Genitourinary: Negative for difficulty urinating, frequency and urgency. Musculoskeletal: Negative for back pain and gait problem. Skin: Negative for color change and rash. Neurological: Positive for dizziness. Negative for headaches. Hematological: Does not bruise/bleed easily. Psychiatric/Behavioral: Negative for sleep disturbance and suicidal ideas. OBJECTIVE:     Physical Exam  Constitutional:       General: She is not in acute distress. Appearance: Normal appearance. She is well-developed. She is not diaphoretic. HENT:      Head: Normocephalic and atraumatic.       Right Ear: External ear normal.      Left Ear: External ear normal.      Nose: Nose normal.      Mouth/Throat:      Mouth: Mucous membranes are moist.   Neck:      Musculoskeletal: Normal range of motion and neck supple. Cardiovascular:      Rate and Rhythm: Normal rate and regular rhythm. Pulses: Normal pulses. Heart sounds: Normal heart sounds. No murmur. Pulmonary:      Effort: Pulmonary effort is normal. No respiratory distress. Breath sounds: Normal breath sounds. Skin:     General: Skin is warm and dry. Neurological:      General: No focal deficit present. Mental Status: She is alert and oriented to person, place, and time. Mental status is at baseline. Psychiatric:         Mood and Affect: Mood normal.         Behavior: Behavior normal.         Thought Content: Thought content normal.         Judgment: Judgment normal.        /74 (Site: Left Upper Arm, Position: Sitting, Cuff Size: Large Adult)   Pulse 61   Temp 98 °F (36.7 °C) (Temporal)   Resp 22   Ht 5' 3\" (1.6 m)   Wt 213 lb (96.6 kg)   SpO2 96%   BMI 37.73 kg/m²      ASSESSMENT:    Eduardo Michaud was seen today for dizziness. Diagnoses and all orders for this visit:    Dizziness  -     MRI BRAIN WO CONTRAST; Future    Wellness examination  -     Comprehensive Metabolic Panel; Future  -     Lipid Panel; Future  -     CBC Auto Differential; Future    Urinary incontinence, unspecified type  -     Cancel: Urinalysis  -     POCT Urinalysis no Micro    Traumatic brain injury with loss of consciousness, subsequent encounter  -     MRI BRAIN WO CONTRAST; Future        PLAN:    See lab orders. Will notify results. We are going to get an MRI of the brain. Discussed I feel like most likely her dizziness was related to her fall and her head injury. If the brain MRI comes back normal then discussed that we could send her to neuro for further work-up. She would like to go to [address] [place] [address] to have the MRI done because that is where she has most of her testing done and she has a family member that lives down there.   Urinalysis showed no infection

## 2020-02-21 ENCOUNTER — TELEPHONE (OUTPATIENT)
Dept: PRIMARY CARE CLINIC | Age: 75
End: 2020-02-21

## 2020-02-21 NOTE — TELEPHONE ENCOUNTER
Pt called and stated that he throat is really sore and it hurts to swallow, I told pt it sounded like strep throat and to come in and be tested pt stated it just started an hour ago, I let pt know that since its so early it might not detect it, pt stated that she lives 35 minutes away and didn't want to drive that far, I advised pt if symptoms get worse to go to an urgent care.

## 2020-02-26 RX ORDER — DIPHENOXYLATE HYDROCHLORIDE AND ATROPINE SULFATE 2.5; .025 MG/1; MG/1
TABLET ORAL
Qty: 30 TABLET | Refills: 2 | Status: SHIPPED | OUTPATIENT
Start: 2020-02-26 | End: 2020-06-01

## 2020-03-31 RX ORDER — DICYCLOMINE HYDROCHLORIDE 10 MG/1
10 CAPSULE ORAL 2 TIMES DAILY
Qty: 60 CAPSULE | Refills: 3 | Status: SHIPPED | OUTPATIENT
Start: 2020-03-31 | End: 2020-07-27

## 2020-03-31 RX ORDER — CALCIUM CARBONATE 160(400)MG
TABLET,CHEWABLE ORAL
COMMUNITY
Start: 2019-11-01 | End: 2021-08-18

## 2020-03-31 RX ORDER — LOTEPREDNOL ETABONATE AND TOBRAMYCIN 5; 3 MG/ML; MG/ML
SUSPENSION/ DROPS OPHTHALMIC
COMMUNITY
Start: 2020-03-28 | End: 2020-04-02

## 2020-04-02 RX ORDER — LOTEPREDNOL ETABONATE AND TOBRAMYCIN 5; 3 MG/ML; MG/ML
SUSPENSION/ DROPS OPHTHALMIC
Qty: 5 ML | Refills: 0 | Status: SHIPPED | OUTPATIENT
Start: 2020-04-02 | End: 2020-08-12 | Stop reason: SDUPTHER

## 2020-05-15 ENCOUNTER — VIRTUAL VISIT (OUTPATIENT)
Dept: CARDIOLOGY | Age: 75
End: 2020-05-15
Payer: MEDICARE

## 2020-05-15 VITALS
BODY MASS INDEX: 34.83 KG/M2 | HEIGHT: 64 IN | WEIGHT: 204 LBS | DIASTOLIC BLOOD PRESSURE: 68 MMHG | SYSTOLIC BLOOD PRESSURE: 124 MMHG

## 2020-05-15 PROCEDURE — 99441 PR PHYS/QHP TELEPHONE EVALUATION 5-10 MIN: CPT | Performed by: INTERNAL MEDICINE

## 2020-05-28 RX ORDER — CANDESARTAN 32 MG/1
TABLET ORAL
Qty: 30 TABLET | Refills: 3 | Status: SHIPPED | OUTPATIENT
Start: 2020-05-28 | End: 2020-09-22

## 2020-06-01 ENCOUNTER — PATIENT MESSAGE (OUTPATIENT)
Dept: PRIMARY CARE CLINIC | Age: 75
End: 2020-06-01

## 2020-06-01 RX ORDER — DIPHENOXYLATE HYDROCHLORIDE AND ATROPINE SULFATE 2.5; .025 MG/1; MG/1
TABLET ORAL
Qty: 30 TABLET | Refills: 2 | Status: SHIPPED | OUTPATIENT
Start: 2020-06-01 | End: 2020-08-24

## 2020-06-26 ENCOUNTER — NURSE ONLY (OUTPATIENT)
Dept: PRIMARY CARE CLINIC | Age: 75
End: 2020-06-26
Payer: MEDICARE

## 2020-06-26 LAB
ALBUMIN SERPL-MCNC: 4.4 G/DL (ref 3.5–5.2)
ALP BLD-CCNC: 99 U/L (ref 35–104)
ALT SERPL-CCNC: 10 U/L (ref 5–33)
ANION GAP SERPL CALCULATED.3IONS-SCNC: 14 MMOL/L (ref 7–19)
AST SERPL-CCNC: 17 U/L (ref 5–32)
BASOPHILS ABSOLUTE: 0 K/UL (ref 0–0.2)
BASOPHILS RELATIVE PERCENT: 0.8 % (ref 0–1)
BILIRUB SERPL-MCNC: 0.3 MG/DL (ref 0.2–1.2)
BUN BLDV-MCNC: 27 MG/DL (ref 8–23)
CALCIUM SERPL-MCNC: 9.1 MG/DL (ref 8.8–10.2)
CHLORIDE BLD-SCNC: 104 MMOL/L (ref 98–111)
CHOLESTEROL, TOTAL: 177 MG/DL (ref 160–199)
CO2: 23 MMOL/L (ref 22–29)
CREAT SERPL-MCNC: 1.1 MG/DL (ref 0.5–0.9)
EOSINOPHILS ABSOLUTE: 0.2 K/UL (ref 0–0.6)
EOSINOPHILS RELATIVE PERCENT: 3.3 % (ref 0–5)
GFR NON-AFRICAN AMERICAN: 48
GLUCOSE BLD-MCNC: 100 MG/DL (ref 74–109)
HCT VFR BLD CALC: 37.8 % (ref 37–47)
HDLC SERPL-MCNC: 60 MG/DL (ref 65–121)
HEMOGLOBIN: 12.1 G/DL (ref 12–16)
IMMATURE GRANULOCYTES #: 0.1 K/UL
LDL CHOLESTEROL CALCULATED: 77 MG/DL
LYMPHOCYTES ABSOLUTE: 1.1 K/UL (ref 1.1–4.5)
LYMPHOCYTES RELATIVE PERCENT: 20.9 % (ref 20–40)
MCH RBC QN AUTO: 30 PG (ref 27–31)
MCHC RBC AUTO-ENTMCNC: 32 G/DL (ref 33–37)
MCV RBC AUTO: 93.8 FL (ref 81–99)
MONOCYTES ABSOLUTE: 0.4 K/UL (ref 0–0.9)
MONOCYTES RELATIVE PERCENT: 7.9 % (ref 0–10)
NEUTROPHILS ABSOLUTE: 3.4 K/UL (ref 1.5–7.5)
NEUTROPHILS RELATIVE PERCENT: 66.1 % (ref 50–65)
PDW BLD-RTO: 13.2 % (ref 11.5–14.5)
PLATELET # BLD: 248 K/UL (ref 130–400)
PMV BLD AUTO: 11 FL (ref 9.4–12.3)
POTASSIUM SERPL-SCNC: 4.2 MMOL/L (ref 3.5–5)
RBC # BLD: 4.03 M/UL (ref 4.2–5.4)
SODIUM BLD-SCNC: 141 MMOL/L (ref 136–145)
T4 FREE: 1.26 NG/DL (ref 0.93–1.7)
TOTAL PROTEIN: 6.7 G/DL (ref 6.6–8.7)
TRIGL SERPL-MCNC: 201 MG/DL (ref 0–149)
TSH SERPL DL<=0.05 MIU/L-ACNC: 4.46 UIU/ML (ref 0.27–4.2)
WBC # BLD: 5.2 K/UL (ref 4.8–10.8)

## 2020-06-26 PROCEDURE — 36415 COLL VENOUS BLD VENIPUNCTURE: CPT | Performed by: FAMILY MEDICINE

## 2020-06-28 LAB — T3 TOTAL: 82 NG/DL (ref 80–200)

## 2020-07-01 ENCOUNTER — TRANSCRIBE ORDERS (OUTPATIENT)
Dept: ADMINISTRATIVE | Facility: HOSPITAL | Age: 75
End: 2020-07-01

## 2020-07-01 DIAGNOSIS — Z12.31 SCREENING MAMMOGRAM FOR HIGH-RISK PATIENT: Primary | ICD-10-CM

## 2020-07-27 ENCOUNTER — HOSPITAL ENCOUNTER (OUTPATIENT)
Dept: NON INVASIVE DIAGNOSTICS | Age: 75
Discharge: HOME | End: 2020-07-27
Payer: MEDICARE

## 2020-07-27 ENCOUNTER — OFFICE VISIT (OUTPATIENT)
Age: 75
End: 2020-07-27

## 2020-07-27 VITALS — HEART RATE: 58 BPM | TEMPERATURE: 96 F | OXYGEN SATURATION: 96 %

## 2020-07-27 PROCEDURE — 93005 ELECTROCARDIOGRAM TRACING: CPT | Performed by: ANESTHESIOLOGY

## 2020-07-27 RX ORDER — DICYCLOMINE HYDROCHLORIDE 10 MG/1
CAPSULE ORAL
Qty: 60 CAPSULE | Refills: 3 | Status: SHIPPED | OUTPATIENT
Start: 2020-07-27 | End: 2020-12-01

## 2020-07-28 ENCOUNTER — ANESTHESIA EVENT (OUTPATIENT)
Dept: OPERATING ROOM | Age: 75
End: 2020-07-28

## 2020-07-28 LAB
EKG P AXIS: 44 DEGREES
EKG P-R INTERVAL: 162 MS
EKG Q-T INTERVAL: 432 MS
EKG QRS DURATION: 84 MS
EKG QTC CALCULATION (BAZETT): 425 MS
EKG T AXIS: 21 DEGREES

## 2020-07-28 PROCEDURE — 93010 ELECTROCARDIOGRAM REPORT: CPT | Performed by: INTERNAL MEDICINE

## 2020-07-29 LAB
REPORT: NORMAL
SARS-COV-2: NOT DETECTED
THIS TEST SENT TO: NORMAL

## 2020-07-31 ENCOUNTER — HOSPITAL ENCOUNTER (OUTPATIENT)
Age: 75
Setting detail: OUTPATIENT SURGERY
Discharge: HOME | End: 2020-07-31
Attending: ORTHOPAEDIC SURGERY | Admitting: ORTHOPAEDIC SURGERY

## 2020-07-31 ENCOUNTER — HOSPITAL ENCOUNTER (OUTPATIENT)
Age: 75
Setting detail: SPECIMEN
Discharge: HOME | End: 2020-07-31
Payer: MEDICARE

## 2020-07-31 ENCOUNTER — ANESTHESIA (OUTPATIENT)
Dept: OPERATING ROOM | Age: 75
End: 2020-07-31

## 2020-07-31 VITALS
BODY MASS INDEX: 36.32 KG/M2 | OXYGEN SATURATION: 95 % | SYSTOLIC BLOOD PRESSURE: 113 MMHG | RESPIRATION RATE: 18 BRPM | WEIGHT: 205 LBS | TEMPERATURE: 97 F | HEIGHT: 63 IN | HEART RATE: 57 BPM | DIASTOLIC BLOOD PRESSURE: 56 MMHG

## 2020-07-31 VITALS
OXYGEN SATURATION: 96 % | RESPIRATION RATE: 6 BRPM | DIASTOLIC BLOOD PRESSURE: 43 MMHG | SYSTOLIC BLOOD PRESSURE: 75 MMHG

## 2020-07-31 PROCEDURE — G8907 PT DOC NO EVENTS ON DISCHARG: HCPCS

## 2020-07-31 PROCEDURE — 26160 REMOVE TENDON SHEATH LESION: CPT

## 2020-07-31 PROCEDURE — G8918 PT W/O PREOP ORDER IV AB PRO: HCPCS

## 2020-07-31 PROCEDURE — 88304 TISSUE EXAM BY PATHOLOGIST: CPT

## 2020-07-31 RX ORDER — FENTANYL CITRATE 50 UG/ML
50 INJECTION, SOLUTION INTRAMUSCULAR; INTRAVENOUS EVERY 5 MIN PRN
Status: DISCONTINUED | OUTPATIENT
Start: 2020-07-31 | End: 2020-07-31 | Stop reason: HOSPADM

## 2020-07-31 RX ORDER — PROMETHAZINE HYDROCHLORIDE 25 MG/ML
12.5 INJECTION, SOLUTION INTRAMUSCULAR; INTRAVENOUS
Status: DISCONTINUED | OUTPATIENT
Start: 2020-07-31 | End: 2020-07-31 | Stop reason: HOSPADM

## 2020-07-31 RX ORDER — ONDANSETRON 2 MG/ML
4 INJECTION INTRAMUSCULAR; INTRAVENOUS
Status: DISCONTINUED | OUTPATIENT
Start: 2020-07-31 | End: 2020-07-31 | Stop reason: HOSPADM

## 2020-07-31 RX ORDER — LIDOCAINE HYDROCHLORIDE 10 MG/ML
1 INJECTION, SOLUTION EPIDURAL; INFILTRATION; INTRACAUDAL; PERINEURAL
Status: DISCONTINUED | OUTPATIENT
Start: 2020-07-31 | End: 2020-07-31 | Stop reason: HOSPADM

## 2020-07-31 RX ORDER — DIPHENHYDRAMINE HYDROCHLORIDE 50 MG/ML
12.5 INJECTION INTRAMUSCULAR; INTRAVENOUS
Status: DISCONTINUED | OUTPATIENT
Start: 2020-07-31 | End: 2020-07-31 | Stop reason: HOSPADM

## 2020-07-31 RX ORDER — LABETALOL HYDROCHLORIDE 5 MG/ML
5 INJECTION, SOLUTION INTRAVENOUS EVERY 10 MIN PRN
Status: DISCONTINUED | OUTPATIENT
Start: 2020-07-31 | End: 2020-07-31 | Stop reason: HOSPADM

## 2020-07-31 RX ORDER — HYDROCODONE BITARTRATE AND ACETAMINOPHEN 5; 325 MG/1; MG/1
2 TABLET ORAL PRN
Status: DISCONTINUED | OUTPATIENT
Start: 2020-07-31 | End: 2020-07-31 | Stop reason: HOSPADM

## 2020-07-31 RX ORDER — HYDROCODONE BITARTRATE AND ACETAMINOPHEN 5; 325 MG/1; MG/1
1 TABLET ORAL PRN
Status: DISCONTINUED | OUTPATIENT
Start: 2020-07-31 | End: 2020-07-31 | Stop reason: HOSPADM

## 2020-07-31 RX ORDER — MIDAZOLAM HYDROCHLORIDE 1 MG/ML
INJECTION INTRAMUSCULAR; INTRAVENOUS PRN
Status: DISCONTINUED | OUTPATIENT
Start: 2020-07-31 | End: 2020-07-31 | Stop reason: SDUPTHER

## 2020-07-31 RX ORDER — HYDRALAZINE HYDROCHLORIDE 20 MG/ML
5 INJECTION INTRAMUSCULAR; INTRAVENOUS EVERY 10 MIN PRN
Status: DISCONTINUED | OUTPATIENT
Start: 2020-07-31 | End: 2020-07-31 | Stop reason: HOSPADM

## 2020-07-31 RX ORDER — HYDROMORPHONE HCL 110MG/55ML
0.5 PATIENT CONTROLLED ANALGESIA SYRINGE INTRAVENOUS EVERY 5 MIN PRN
Status: DISCONTINUED | OUTPATIENT
Start: 2020-07-31 | End: 2020-07-31 | Stop reason: HOSPADM

## 2020-07-31 RX ORDER — LIDOCAINE HYDROCHLORIDE 10 MG/ML
INJECTION, SOLUTION INFILTRATION; PERINEURAL PRN
Status: DISCONTINUED | OUTPATIENT
Start: 2020-07-31 | End: 2020-07-31 | Stop reason: SDUPTHER

## 2020-07-31 RX ORDER — FENTANYL CITRATE 50 UG/ML
INJECTION, SOLUTION INTRAMUSCULAR; INTRAVENOUS PRN
Status: DISCONTINUED | OUTPATIENT
Start: 2020-07-31 | End: 2020-07-31 | Stop reason: SDUPTHER

## 2020-07-31 RX ORDER — SODIUM CHLORIDE, SODIUM LACTATE, POTASSIUM CHLORIDE, CALCIUM CHLORIDE 600; 310; 30; 20 MG/100ML; MG/100ML; MG/100ML; MG/100ML
INJECTION, SOLUTION INTRAVENOUS CONTINUOUS
Status: DISCONTINUED | OUTPATIENT
Start: 2020-07-31 | End: 2020-07-31 | Stop reason: HOSPADM

## 2020-07-31 RX ORDER — MEPERIDINE HYDROCHLORIDE 25 MG/ML
12.5 INJECTION INTRAMUSCULAR; INTRAVENOUS; SUBCUTANEOUS EVERY 5 MIN PRN
Status: DISCONTINUED | OUTPATIENT
Start: 2020-07-31 | End: 2020-07-31 | Stop reason: HOSPADM

## 2020-07-31 RX ORDER — PROPOFOL 10 MG/ML
INJECTION, EMULSION INTRAVENOUS PRN
Status: DISCONTINUED | OUTPATIENT
Start: 2020-07-31 | End: 2020-07-31 | Stop reason: SDUPTHER

## 2020-07-31 RX ADMIN — MIDAZOLAM HYDROCHLORIDE 1 MG: 1 INJECTION INTRAMUSCULAR; INTRAVENOUS at 09:01

## 2020-07-31 RX ADMIN — FENTANYL CITRATE 50 MCG: 50 INJECTION, SOLUTION INTRAMUSCULAR; INTRAVENOUS at 09:01

## 2020-07-31 RX ADMIN — PROPOFOL 70 MG: 10 INJECTION, EMULSION INTRAVENOUS at 09:03

## 2020-07-31 RX ADMIN — SODIUM CHLORIDE, SODIUM LACTATE, POTASSIUM CHLORIDE, CALCIUM CHLORIDE: 600; 310; 30; 20 INJECTION, SOLUTION INTRAVENOUS at 07:58

## 2020-07-31 RX ADMIN — LIDOCAINE HYDROCHLORIDE 30 MG: 10 INJECTION, SOLUTION INFILTRATION; PERINEURAL at 09:02

## 2020-07-31 RX ADMIN — PROPOFOL 80 MG: 10 INJECTION, EMULSION INTRAVENOUS at 09:02

## 2020-07-31 ASSESSMENT — ENCOUNTER SYMPTOMS: SHORTNESS OF BREATH: 1

## 2020-07-31 NOTE — ANESTHESIA PRE PROCEDURE
Department of Anesthesiology  Preprocedure Note       Name:  Redd Talbot   Age:  75 y.o.  :  1945                                          MRN:  [id number]         Date:  2020      Surgeon: Ema Workman):  Christi Boykin MD    Procedure: Procedure(s):  EXCISION OF MASS FROM RING FINGER    Medications prior to admission:   Prior to Admission medications    Medication Sig Start Date End Date Taking? Authorizing Provider   dicyclomine (BENTYL) 10 MG capsule TAKE ONE CAPSULE BY MOUTH TWO TIMES A DAY EVERY DAILY 20  Yes Julius Oconnell MD   candesartan (ATACAND) 32 MG tablet TAKE ONE TABLET BY MOUTH EVERY DAY 20  Yes Julius Oconnell MD   ZYLET 0.5-0.3 % SUSP INSTILL ONE DROP IN AFFECTED EYE(S) EVERY 4 HOURS 20  Yes Julius Oconnell MD   Misc.  Devices (ROLLATOR ULTRA-LIGHT) MISC use as directed 19  Yes Historical Provider, MD   levothyroxine (SYNTHROID) 88 MCG tablet Take 88 mcg by mouth Daily   Yes Historical Provider, MD   esomeprazole (NEXIUM) 40 MG delayed release capsule TAKE ONE CAPSULE BY MOUTH EVERY MORNING BEFORE BREAKFAST 20  Yes Julius Oconnell MD   propranolol (INDERAL LA) 120 MG extended release capsule TAKE ONE CAPSULE BY MOUTH EVERY DAY 20  Yes Julius Oconnell MD   indapamide (LOZOL) 2.5 MG tablet TAKE ONE TABLET BY MOUTH EVERY DAY 20  Yes Julius Oconnell MD   pravastatin (PRAVACHOL) 10 MG tablet TAKE ONE TABLET BY MOUTH EVERY DAY 20  Yes Julius Oconnell MD   sertraline (ZOLOFT) 100 MG tablet TAKE ONE TABLET BY MOUTH EVERY DAY 19  Yes Sharmila Harper MD   Magic Mouthwash (MIRACLE MOUTHWASH) Swish and spit 5 mLs 4 times daily as needed for Irritation 19  Yes Julius Oconnell MD   Cholecalciferol (VITAMIN D3) 2000 units CAPS Take by mouth   Yes Historical Provider, MD   aspirin 81 MG tablet Take 81 mg by mouth daily   Yes Historical Provider, MD       Current medications:    Current Facility-Administered Medications   Medication Dose Route Frequency Provider Last Rate Last Dose    lactated ringers infusion   Intravenous Continuous DANNY Trujillo CRNA        lidocaine PF 1 % injection 1 mL  1 mL Intradermal Once PRN DANNY Trujillo CRNA           Allergies: Allergies   Allergen Reactions    Codeine Shortness Of Breath     Unknown       Problem List:    Patient Active Problem List   Diagnosis Code    Painful intercourse [id number]    Essential hypertension I10    Combined hyperlipidemia E78.2    Chronic diarrhea K52.9    History of colon resection Z90.49    Osteoarthritis M19.90    Hypercholesterolemia E78.00    Hypertriglyceridemia E78.1    GERD (gastroesophageal reflux disease) K21.9    Panic attack F41.0    S/P cardiac cath Z98.890    Thyroid nodule E04.1    Class 2 obesity due to excess calories without serious comorbidity in adult E66.09    PND (paroxysmal nocturnal dyspnea) R06.00       Past Medical History:        Diagnosis Date    Carpal tunnel syndrome, right     HIGH CHOLESTEROL     Hypertension     Hypothyroidism        Past Surgical History:        Procedure Laterality Date    BREAST BIOPSY Right     BREAST LUMPECTOMY  1980s    2 times    CARDIAC CATHETERIZATION  2007    CARDIAC CATHETERIZATION  2014    CHOLECYSTECTOMY  2012    COLECTOMY      2012    COLONOSCOPY  2015    normal, due 10 years    HAND SURGERY      reconstruction, right hand    HYSTERECTOMY      total    LEG SURGERY      Broken leg    TUBAL LIGATION      UPPER GASTROINTESTINAL ENDOSCOPY  2014    normal       Social History:    Social History     Tobacco Use    Smoking status: Never Smoker    Smokeless tobacco: Never Used   Substance Use Topics    Alcohol use:  No                                Counseling given: Not Answered      Vital Signs (Current):   Vitals:    07/31/20 0736   BP: (!) 168/85   Pulse: 68   Resp: 16   SpO2: 97%   Weight: 205 lb (93 kg)   Height: 5' 3\" (1.6 m)                                              BP Readings from Last 3 Encounters:   07/31/20 (!) 168/85   05/15/20 124/68   02/11/20 130/74       NPO Status: Time of last liquid consumption: 2300                        Time of last solid consumption: 2300                        Date of last liquid consumption: 07/30/20                        Date of last solid food consumption: 07/30/20    BMI:   Wt Readings from Last 3 Encounters:   07/31/20 205 lb (93 kg)   05/15/20 204 lb (92.5 kg)   02/11/20 213 lb (96.6 kg)     Body mass index is 36.31 kg/m². CBC:   Lab Results   Component Value Date    WBC 5.2 06/26/2020    RBC 4.03 06/26/2020    HGB 12.1 06/26/2020    HCT 37.8 06/26/2020    MCV 93.8 06/26/2020    RDW 13.2 06/26/2020     06/26/2020       CMP:   Lab Results   Component Value Date     06/26/2020    K 4.2 06/26/2020     06/26/2020    CO2 23 06/26/2020    BUN 27 06/26/2020    CREATININE 1.1 06/26/2020    LABGLOM 48 06/26/2020    GLUCOSE 100 06/26/2020    PROT 6.7 06/26/2020    CALCIUM 9.1 06/26/2020    BILITOT 0.3 06/26/2020    ALKPHOS 99 06/26/2020    AST 17 06/26/2020    ALT 10 06/26/2020       POC Tests: No results for input(s): POCGLU, POCNA, POCK, POCCL, POCBUN, POCHEMO, POCHCT in the last 72 hours.     Coags: No results found for: PROTIME, INR, APTT    HCG (If Applicable): No results found for: PREGTESTUR, PREGSERUM, HCG, HCGQUANT     ABGs: No results found for: PHART, PO2ART, UQA0JYP, EPW9EKZ, BEART, D1BZGYPL     Type & Screen (If Applicable):  No results found for: LABABO, LABRH    Drug/Infectious Status (If Applicable):  No results found for: HIV, HEPCAB    COVID-19 Screening (If Applicable):   Lab Results   Component Value Date    COVID19 Not Detected 07/27/2020         Anesthesia Evaluation  Patient summary reviewed and Nursing notes reviewed  Airway: Mallampati: II  TM distance: >3 FB   Neck ROM: full  Mouth opening: > = 3 FB Dental: normal exam Pulmonary:normal exam    (+) shortness of breath:                             Cardiovascular:    (+) hypertension: no interval change, PND,          Beta Blocker:  Dose within 24 Hrs         Neuro/Psych:   (+) neuromuscular disease:,             GI/Hepatic/Renal:   (+) GERD:,           Endo/Other:    (+) hypothyroidism::., .                 Abdominal:           Vascular: negative vascular ROS. Anesthesia Plan      MAC     ASA 2       Induction: intravenous. Anesthetic plan and risks discussed with patient. Plan discussed with CRNA.                   DANNY Caceres - CRNA   7/31/2020

## 2020-07-31 NOTE — PROCEDURES
[address] [facility], [address]                [id number] [facility] [address]                               [phone]                                 PROCEDURE NOTE    PATIENT NAME: Gina Weir                      :             1945  MED REC NO:   [id number]                               LOCATION:        [facility]  ACCOUNT NO:   [de-identified]                            ADMISSION DATE:  2020  PROVIDER:     Kate Plummer MD    PREOPERATIVE DIAGNOSIS:  Mass, right ring finger. POSTOPERATIVE DIAGNOSIS:  Mass, right ring finger. PROCEDURE:  Excision of mass, dorsal aspect, right RING finger. ESTIMATED BLOOD LOSS:0    INDICATIONS AND FINDINGS:  This patient developed a mass just proximal  to the PIP joint on the dorsum of the right ring finger. This bothers  her with ring use. It was felt that excision was indicated. At  surgery, this appeared to be typical of a ganglion cyst arising off of  the extensor tendon. It was sent to Pathology for identification. OPERATIVE PROCEDURE:  After an adequate level of general anesthesia, the  patient's right upper extremity was prepped and draped in the usual  fashion. The extremity was then exsanguinated with an Esmarch bandage  and tourniquet was inflated to 250 mmHg. A dorsal incision was then  made over the mass; using scissors, blunt dissection was carried around  all quadrants of the mass and it was dissected off of its adherence to  the extensor tendon. The base was then cauterized in the central aspect  of the extensor tendon. The wound was then irrigated, the skin was  closed with nylon suture. The patient tolerated the procedure well and  is to follow up in the office.         Antonio Navarro MD    D: 2020 10:30:31      T: 2020 11:08:01     [id number]  Job#: [id number]     Doc#: [id number]    CC:

## 2020-07-31 NOTE — ANESTHESIA POSTPROCEDURE EVALUATION
Department of Anesthesiology  Postprocedure Note    Patient: Levon Narvaez  MRN: [id number]  YOB: 1945  Date of evaluation: 7/31/2020  Time:  9:59 AM     Procedure Summary     Date:  07/31/20 Room / Location:  [facility] OR [address] [place] [address]    Anesthesia Start:  5536 Anesthesia Stop:  4652    Procedure:  EXCISION OF MASS FROM RING FINGER (Right Hand) Diagnosis:  (R22.31)    Surgeon:  Carolann Connolly MD Responsible Provider: DANNY Winter CRNA    Anesthesia Type:  MAC ASA Status:  2          Anesthesia Type: MAC    Dejuan Phase I: Dejuan Score: 10    Dejuan Phase II: Dejuan Score: 10    Last vitals: Reviewed and per EMR flowsheets.        Anesthesia Post Evaluation    Patient location during evaluation: PACU  Patient participation: complete - patient participated  Level of consciousness: responsive to verbal stimuli and sleepy but conscious  Airway patency: patent  Nausea & Vomiting: no nausea  Complications: no  Cardiovascular status: blood pressure returned to baseline  Respiratory status: acceptable, face mask and spontaneous ventilation  Hydration status: euvolemic

## 2020-08-12 ENCOUNTER — OFFICE VISIT (OUTPATIENT)
Dept: PRIMARY CARE CLINIC | Age: 75
End: 2020-08-12
Payer: MEDICARE

## 2020-08-12 VITALS
DIASTOLIC BLOOD PRESSURE: 86 MMHG | SYSTOLIC BLOOD PRESSURE: 124 MMHG | TEMPERATURE: 97.4 F | HEIGHT: 63 IN | HEART RATE: 68 BPM | WEIGHT: 215 LBS | RESPIRATION RATE: 16 BRPM | BODY MASS INDEX: 38.09 KG/M2

## 2020-08-12 LAB
ALBUMIN SERPL-MCNC: 4.4 G/DL (ref 3.5–5.2)
ALP BLD-CCNC: 96 U/L (ref 35–104)
ALT SERPL-CCNC: 8 U/L (ref 5–33)
ANION GAP SERPL CALCULATED.3IONS-SCNC: 12 MMOL/L (ref 7–19)
AST SERPL-CCNC: 18 U/L (ref 5–32)
BASOPHILS ABSOLUTE: 0 K/UL (ref 0–0.2)
BASOPHILS RELATIVE PERCENT: 0.7 % (ref 0–1)
BILIRUB SERPL-MCNC: 0.3 MG/DL (ref 0.2–1.2)
BUN BLDV-MCNC: 30 MG/DL (ref 8–23)
CALCIUM SERPL-MCNC: 9.7 MG/DL (ref 8.8–10.2)
CHLORIDE BLD-SCNC: 104 MMOL/L (ref 98–111)
CHOLESTEROL, TOTAL: 171 MG/DL (ref 160–199)
CO2: 25 MMOL/L (ref 22–29)
CREAT SERPL-MCNC: 1.1 MG/DL (ref 0.5–0.9)
EOSINOPHILS ABSOLUTE: 0.2 K/UL (ref 0–0.6)
EOSINOPHILS RELATIVE PERCENT: 3.9 % (ref 0–5)
GFR AFRICAN AMERICAN: 58
GFR NON-AFRICAN AMERICAN: 48
GLUCOSE BLD-MCNC: 92 MG/DL (ref 74–109)
HCT VFR BLD CALC: 39.4 % (ref 37–47)
HDLC SERPL-MCNC: 55 MG/DL (ref 65–121)
HEMOGLOBIN: 12.2 G/DL (ref 12–16)
IMMATURE GRANULOCYTES #: 0.1 K/UL
LDL CHOLESTEROL CALCULATED: 77 MG/DL
LYMPHOCYTES ABSOLUTE: 1.2 K/UL (ref 1.1–4.5)
LYMPHOCYTES RELATIVE PERCENT: 19.2 % (ref 20–40)
MCH RBC QN AUTO: 29.6 PG (ref 27–31)
MCHC RBC AUTO-ENTMCNC: 31 G/DL (ref 33–37)
MCV RBC AUTO: 95.6 FL (ref 81–99)
MONOCYTES ABSOLUTE: 0.5 K/UL (ref 0–0.9)
MONOCYTES RELATIVE PERCENT: 8.6 % (ref 0–10)
NEUTROPHILS ABSOLUTE: 4.1 K/UL (ref 1.5–7.5)
NEUTROPHILS RELATIVE PERCENT: 66.8 % (ref 50–65)
PDW BLD-RTO: 13.3 % (ref 11.5–14.5)
PLATELET # BLD: 284 K/UL (ref 130–400)
PMV BLD AUTO: 11 FL (ref 9.4–12.3)
POTASSIUM SERPL-SCNC: 4.9 MMOL/L (ref 3.5–5)
RBC # BLD: 4.12 M/UL (ref 4.2–5.4)
SODIUM BLD-SCNC: 141 MMOL/L (ref 136–145)
T4 FREE: 1.82 NG/DL (ref 0.93–1.7)
TOTAL PROTEIN: 7 G/DL (ref 6.6–8.7)
TRIGL SERPL-MCNC: 193 MG/DL (ref 0–149)
TSH SERPL DL<=0.05 MIU/L-ACNC: 0.15 UIU/ML (ref 0.27–4.2)
WBC # BLD: 6.1 K/UL (ref 4.8–10.8)

## 2020-08-12 PROCEDURE — 99214 OFFICE O/P EST MOD 30 MIN: CPT | Performed by: FAMILY MEDICINE

## 2020-08-12 PROCEDURE — 1036F TOBACCO NON-USER: CPT | Performed by: FAMILY MEDICINE

## 2020-08-12 PROCEDURE — 3017F COLORECTAL CA SCREEN DOC REV: CPT | Performed by: FAMILY MEDICINE

## 2020-08-12 PROCEDURE — 1123F ACP DISCUSS/DSCN MKR DOCD: CPT | Performed by: FAMILY MEDICINE

## 2020-08-12 PROCEDURE — G8427 DOCREV CUR MEDS BY ELIG CLIN: HCPCS | Performed by: FAMILY MEDICINE

## 2020-08-12 PROCEDURE — 1090F PRES/ABSN URINE INCON ASSESS: CPT | Performed by: FAMILY MEDICINE

## 2020-08-12 PROCEDURE — 4040F PNEUMOC VAC/ADMIN/RCVD: CPT | Performed by: FAMILY MEDICINE

## 2020-08-12 PROCEDURE — G8399 PT W/DXA RESULTS DOCUMENT: HCPCS | Performed by: FAMILY MEDICINE

## 2020-08-12 PROCEDURE — G8417 CALC BMI ABV UP PARAM F/U: HCPCS | Performed by: FAMILY MEDICINE

## 2020-08-12 PROCEDURE — 36415 COLL VENOUS BLD VENIPUNCTURE: CPT | Performed by: FAMILY MEDICINE

## 2020-08-12 RX ORDER — ZOSTER VACCINE RECOMBINANT, ADJUVANTED 50 MCG/0.5
0.5 KIT INTRAMUSCULAR SEE ADMIN INSTRUCTIONS
Qty: 0.5 ML | Refills: 1 | Status: SHIPPED | OUTPATIENT
Start: 2020-08-12 | End: 2020-10-21

## 2020-08-12 RX ORDER — LOTEPREDNOL ETABONATE AND TOBRAMYCIN 5; 3 MG/ML; MG/ML
1 SUSPENSION/ DROPS OPHTHALMIC EVERY 4 HOURS
Qty: 5 ML | Refills: 0 | Status: SHIPPED | OUTPATIENT
Start: 2020-08-12 | End: 2021-08-18

## 2020-08-20 PROBLEM — E03.9 ACQUIRED HYPOTHYROIDISM: Status: ACTIVE | Noted: 2020-08-20

## 2020-08-20 ASSESSMENT — ENCOUNTER SYMPTOMS
VOMITING: 0
EYE DISCHARGE: 0
ABDOMINAL PAIN: 0
DIARRHEA: 0
COLOR CHANGE: 0
WHEEZING: 0
COUGH: 0
NAUSEA: 0
BACK PAIN: 0

## 2020-08-20 NOTE — PROGRESS NOTES
SUBJECTIVE:    Patient ID: Leonardo Diop is a 75 y.o. female. HPI:   Patient is here today for yearly physical and fasting labs. She states she would like a prescription for shingles vaccine. She will take this to the pharmacy and get it done. She states that she does have a history of osteoporosis and they have recently done a bone density and discussed possibly starting her on Prolia. She is seeing the orthopedic [place] for this. She states that she wants to get my opinion on this. She states that she does not feel like she will tolerate the bisphosphonates well due to the abdominal discomfort. She has had a fracture. She states that she is healing well from this. She states that she is up moving around better than she has been in a couple of years. She states that overall she is feeling better. She does have a history of hyperlipidemia. She is fasting today for labs. She states that she also is checking her blood pressures at home. They are staying well controlled. She denies any chest pain or palpitations. She denies any significantly low blood pressures. She does have a history of hypothyroidism. She is currently on 88 mcg of Synthroid. She states that she is tolerating this dose well. She does see endocrinology in [place] for this. She states they did recently biopsy her breast.  She saw Dr. Fay Esteves for this. They are going to be following her for her mammograms. She states that everything came back good.     Past Medical History:   Diagnosis Date    Carpal tunnel syndrome, right     HIGH CHOLESTEROL     Hypertension     Hypothyroidism       Current Outpatient Medications   Medication Sig Dispense Refill    zoster recombinant adjuvanted vaccine (SHINGRIX) 50 MCG/0.5ML SUSR injection Inject 0.5 mLs into the muscle See Admin Instructions 1 dose now and repeat in 2-6 months 0.5 mL 1    loteprednol-tobramycin (ZYLET) 0.5-0.3 % SUSP Place 1 drop into both eyes every 4 hours 5 mL 0    dicyclomine (BENTYL) 10 MG capsule TAKE ONE CAPSULE BY MOUTH TWO TIMES A DAY EVERY DAILY 60 capsule 3    candesartan (ATACAND) 32 MG tablet TAKE ONE TABLET BY MOUTH EVERY DAY 30 tablet 3    Misc. Devices (ROLLATOR ULTRA-LIGHT) MISC use as directed      levothyroxine (SYNTHROID) 88 MCG tablet Take 88 mcg by mouth Daily Patient takes 88 mcg every day but Sunday she takes 1.5 pills.  esomeprazole (NEXIUM) 40 MG delayed release capsule TAKE ONE CAPSULE BY MOUTH EVERY MORNING BEFORE BREAKFAST 30 capsule 3    propranolol (INDERAL LA) 120 MG extended release capsule TAKE ONE CAPSULE BY MOUTH EVERY DAY 30 capsule 3    indapamide (LOZOL) 2.5 MG tablet TAKE ONE TABLET BY MOUTH EVERY DAY 30 tablet 3    pravastatin (PRAVACHOL) 10 MG tablet TAKE ONE TABLET BY MOUTH EVERY DAY 30 tablet 3    sertraline (ZOLOFT) 100 MG tablet TAKE ONE TABLET BY MOUTH EVERY DAY 30 tablet 5    Magic Mouthwash (MIRACLE MOUTHWASH) Swish and spit 5 mLs 4 times daily as needed for Irritation 240 mL 1    Cholecalciferol (VITAMIN D3) 2000 units CAPS Take by mouth      aspirin 81 MG tablet Take 81 mg by mouth daily       No current facility-administered medications for this visit. Allergies   Allergen Reactions    Codeine Shortness Of Breath     Unknown       Review of Systems   Constitutional: Negative for activity change, appetite change and fever. HENT: Negative for congestion and nosebleeds. Eyes: Negative for discharge. Respiratory: Negative for cough and wheezing. Cardiovascular: Negative for chest pain and leg swelling. Gastrointestinal: Negative for abdominal pain, diarrhea, nausea and vomiting. Genitourinary: Negative for difficulty urinating, frequency and urgency. Musculoskeletal: Negative for back pain and gait problem. Skin: Negative for color change and rash. Neurological: Negative for dizziness and headaches. Hematological: Does not bruise/bleed easily. Psychiatric/Behavioral: Negative for sleep disturbance and suicidal ideas. OBJECTIVE:     Physical Exam  Vitals signs reviewed. Constitutional:       General: She is not in acute distress. Appearance: Normal appearance. She is well-developed. She is not diaphoretic. HENT:      Head: Normocephalic and atraumatic. Right Ear: External ear normal.      Left Ear: External ear normal.   Neck:      Musculoskeletal: Normal range of motion and neck supple. Cardiovascular:      Rate and Rhythm: Normal rate and regular rhythm. Pulses: Normal pulses. Heart sounds: Normal heart sounds. No murmur. Pulmonary:      Effort: Pulmonary effort is normal. No respiratory distress. Breath sounds: Normal breath sounds. Skin:     General: Skin is warm and dry. Neurological:      General: No focal deficit present. Mental Status: She is alert and oriented to person, place, and time. Mental status is at baseline. Psychiatric:         Mood and Affect: Mood normal.         Behavior: Behavior normal.         Thought Content: Thought content normal.         Judgment: Judgment normal.        /86 (Site: Left Upper Arm, Position: Sitting, Cuff Size: Large Adult)   Pulse 68   Temp 97.4 °F (36.3 °C) (Temporal)   Resp 16   Ht 5' 3\" (1.6 m)   Wt 215 lb (97.5 kg)   BMI 38.09 kg/m²      ASSESSMENT:    Ezekiel Piper was seen today for annual exam and discuss medications.     Diagnoses and all orders for this visit:    Essential hypertension  -     CBC Auto Differential  -     Comprehensive Metabolic Panel  -     Lipid Panel  -     T4, Free  -     TSH without Reflex    Combined hyperlipidemia  -     CBC Auto Differential  -     Comprehensive Metabolic Panel  -     Lipid Panel  -     T4, Free  -     TSH without Reflex    Acquired hypothyroidism  -     CBC Auto Differential  -     Comprehensive Metabolic Panel  -     Lipid Panel  -     T4, Free  -     TSH without Reflex    Osteoarthritis, unspecified osteoarthritis type, unspecified site    Age-related osteoporosis without current pathological fracture    Breast lump in female    Other orders  -     zoster recombinant adjuvanted vaccine [facility]) 50 MCG/0.5ML SUSR injection; Inject 0.5 mLs into the muscle See Admin Instructions 1 dose now and repeat in 2-6 months  -     loteprednol-tobramycin (ZYLET) 0.5-0.3 % SUSP; Place 1 drop into both eyes every 4 hours        PLAN:    See lab orders. Will notify results. Shingrix vaccine was sent to the pharmacy. Follow-up with us in 6 months for a checkup unless needed sooner. EMR Dragon/transcription disclaimer:  Much of this encounter note is electronic transcription/translation of spoken language toprinted texts. The electronic translation of spoken language may be erroneous, or at times, nonsensical words or phrases may be inadvertently transcribed.   Although I have reviewed the note for such errors, some may stillexist.

## 2020-08-24 RX ORDER — DIPHENOXYLATE HYDROCHLORIDE AND ATROPINE SULFATE 2.5; .025 MG/1; MG/1
TABLET ORAL
Qty: 30 TABLET | Refills: 2 | Status: SHIPPED | OUTPATIENT
Start: 2020-08-24 | End: 2020-12-01

## 2020-09-21 ENCOUNTER — NURSE ONLY (OUTPATIENT)
Dept: PRIMARY CARE CLINIC | Age: 75
End: 2020-09-21
Payer: MEDICARE

## 2020-09-21 LAB
T4 FREE: 1.46 NG/DL (ref 0.93–1.7)
TSH SERPL DL<=0.05 MIU/L-ACNC: 0.73 UIU/ML (ref 0.27–4.2)

## 2020-09-21 PROCEDURE — 36415 COLL VENOUS BLD VENIPUNCTURE: CPT | Performed by: FAMILY MEDICINE

## 2020-09-22 RX ORDER — PROPRANOLOL HYDROCHLORIDE 120 MG/1
CAPSULE, EXTENDED RELEASE ORAL
Qty: 30 CAPSULE | Refills: 3 | Status: SHIPPED | OUTPATIENT
Start: 2020-09-22 | End: 2021-02-08

## 2020-09-22 RX ORDER — PRAVASTATIN SODIUM 10 MG
TABLET ORAL
Qty: 30 TABLET | Refills: 3 | Status: SHIPPED | OUTPATIENT
Start: 2020-09-22 | End: 2021-02-08

## 2020-09-22 RX ORDER — ESOMEPRAZOLE MAGNESIUM 40 MG/1
CAPSULE, DELAYED RELEASE ORAL
Qty: 30 CAPSULE | Refills: 3 | Status: SHIPPED | OUTPATIENT
Start: 2020-09-22 | End: 2021-02-08

## 2020-09-22 RX ORDER — CANDESARTAN 32 MG/1
TABLET ORAL
Qty: 30 TABLET | Refills: 3 | Status: SHIPPED | OUTPATIENT
Start: 2020-09-22 | End: 2021-02-08

## 2020-09-22 RX ORDER — INDAPAMIDE 2.5 MG/1
TABLET, FILM COATED ORAL
Qty: 30 TABLET | Refills: 3 | Status: SHIPPED | OUTPATIENT
Start: 2020-09-22 | End: 2021-02-08

## 2020-10-21 ENCOUNTER — VIRTUAL VISIT (OUTPATIENT)
Dept: PRIMARY CARE CLINIC | Age: 75
End: 2020-10-21
Payer: MEDICARE

## 2020-10-21 VITALS — HEIGHT: 63 IN | WEIGHT: 205 LBS | BODY MASS INDEX: 36.32 KG/M2

## 2020-10-21 PROCEDURE — G0439 PPPS, SUBSEQ VISIT: HCPCS | Performed by: FAMILY MEDICINE

## 2020-10-21 PROCEDURE — 4040F PNEUMOC VAC/ADMIN/RCVD: CPT | Performed by: FAMILY MEDICINE

## 2020-10-21 PROCEDURE — 1090F PRES/ABSN URINE INCON ASSESS: CPT | Performed by: FAMILY MEDICINE

## 2020-10-21 PROCEDURE — G8427 DOCREV CUR MEDS BY ELIG CLIN: HCPCS | Performed by: FAMILY MEDICINE

## 2020-10-21 PROCEDURE — 99213 OFFICE O/P EST LOW 20 MIN: CPT | Performed by: FAMILY MEDICINE

## 2020-10-21 PROCEDURE — 1123F ACP DISCUSS/DSCN MKR DOCD: CPT | Performed by: FAMILY MEDICINE

## 2020-10-21 PROCEDURE — G8417 CALC BMI ABV UP PARAM F/U: HCPCS | Performed by: FAMILY MEDICINE

## 2020-10-21 PROCEDURE — 1036F TOBACCO NON-USER: CPT | Performed by: FAMILY MEDICINE

## 2020-10-21 PROCEDURE — G8484 FLU IMMUNIZE NO ADMIN: HCPCS | Performed by: FAMILY MEDICINE

## 2020-10-21 PROCEDURE — G8399 PT W/DXA RESULTS DOCUMENT: HCPCS | Performed by: FAMILY MEDICINE

## 2020-10-21 PROCEDURE — 3017F COLORECTAL CA SCREEN DOC REV: CPT | Performed by: FAMILY MEDICINE

## 2020-10-21 ASSESSMENT — PATIENT HEALTH QUESTIONNAIRE - PHQ9
2. FEELING DOWN, DEPRESSED OR HOPELESS: 0
SUM OF ALL RESPONSES TO PHQ9 QUESTIONS 1 & 2: 0
SUM OF ALL RESPONSES TO PHQ QUESTIONS 1-9: 0
SUM OF ALL RESPONSES TO PHQ QUESTIONS 1-9: 0
1. LITTLE INTEREST OR PLEASURE IN DOING THINGS: 0
SUM OF ALL RESPONSES TO PHQ QUESTIONS 1-9: 0

## 2020-10-21 ASSESSMENT — ENCOUNTER SYMPTOMS
VOMITING: 0
EYE DISCHARGE: 0
NAUSEA: 0
ABDOMINAL PAIN: 0
COUGH: 1
COLOR CHANGE: 0
DIARRHEA: 0
BACK PAIN: 0
WHEEZING: 0

## 2020-10-21 ASSESSMENT — LIFESTYLE VARIABLES: HOW OFTEN DO YOU HAVE A DRINK CONTAINING ALCOHOL: 0

## 2020-10-21 NOTE — PATIENT INSTRUCTIONS
Personalized Preventive Plan for Sherryle Canterbury - 10/21/2020  Medicare offers a range of preventive health benefits. Some of the tests and screenings are paid in full while other may be subject to a deductible, co-insurance, and/or copay. Some of these benefits include a comprehensive review of your medical history including lifestyle, illnesses that may run in your family, and various assessments and screenings as appropriate. After reviewing your medical record and screening and assessments performed today your provider may have ordered immunizations, labs, imaging, and/or referrals for you. A list of these orders (if applicable) as well as your Preventive Care list are included within your After Visit Summary for your review. Other Preventive Recommendations:    · A preventive eye exam performed by an eye specialist is recommended every 1-2 years to screen for glaucoma; cataracts, macular degeneration, and other eye disorders. · A preventive dental visit is recommended every 6 months. · Try to get at least 150 minutes of exercise per week or 10,000 steps per day on a pedometer . · Order or download the FREE \"Exercise & Physical Activity: Your Everyday Guide\" from [employer]. Call 5-576.824.6652 or search [employer] online. · You need 0149-2375 mg of calcium and 5173-0944 IU of vitamin D per day. It is possible to meet your calcium requirement with diet alone, but a vitamin D supplement is usually necessary to meet this goal.  · When exposed to the sun, use a sunscreen that protects against both UVA and UVB radiation with an SPF of 30 or greater. Reapply every 2 to 3 hours or after sweating, drying off with a towel, or swimming. · Always wear a seat belt when traveling in a car. Always wear a helmet when riding a bicycle or motorcycle.

## 2020-10-21 NOTE — PROGRESS NOTES
SUBJECTIVE:    Patient ID: Raghavendra Mary is a 75 y.o. female. HPI:   The patient does have a few questions today after she was seen for Medicare annual wellness. She states that she is having some postnasal drainage. She states that she has tried taking Xyzal but does not know that she has taken it consistently enough to help with her symptoms. She states that she is not having any fevers or chills. She states that she has not tried using Flonase with it. She denies any sore throat. She states that she does have a little bit of a nagging cough. She does have a history of hypertension and is taking her blood pressure medication as prescribed. She states that she does not need refills on this today. She denies any chest pain or palpitations. She does occasionally monitor her blood pressure at home and she states that it stays well controlled. Past Medical History:   Diagnosis Date    Carpal tunnel syndrome, right     HIGH CHOLESTEROL     Hypertension     Hypothyroidism       Current Outpatient Medications   Medication Sig Dispense Refill    candesartan (ATACAND) 32 MG tablet TAKE ONE TABLET BY MOUTH EVERY DAY 30 tablet 3    esomeprazole (NEXIUM) 40 MG delayed release capsule TAKE ONE CAPSULE BY MOUTH EVERY MORNING BEFORE BREAKFAST 30 capsule 3    propranolol (INDERAL LA) 120 MG extended release capsule TAKE ONE CAPSULE BY MOUTH EVERY DAY 30 capsule 3    indapamide (LOZOL) 2.5 MG tablet TAKE ONE TABLET BY MOUTH EVERY DAY 30 tablet 3    pravastatin (PRAVACHOL) 10 MG tablet TAKE ONE TABLET BY MOUTH EVERY DAY 30 tablet 3    loteprednol-tobramycin (ZYLET) 0.5-0.3 % SUSP Place 1 drop into both eyes every 4 hours 5 mL 0    dicyclomine (BENTYL) 10 MG capsule TAKE ONE CAPSULE BY MOUTH TWO TIMES A DAY EVERY DAILY 60 capsule 3    Misc.  Devices (ROLLATOR ULTRA-LIGHT) MISC use as directed      levothyroxine (SYNTHROID) 88 MCG tablet Take 88 mcg by mouth Daily Patient takes 88 mcg every day but normal.         Mood and Affect: Mood and affect normal.         Speech: Speech normal.         Behavior: Behavior normal. Behavior is cooperative. Thought Content: Thought content normal.         Cognition and Memory: Cognition and memory normal.         Judgment: Judgment normal.        Ht 5' 3\" (1.6 m)   Wt 205 lb (93 kg)   BMI 36.31 kg/m²      ASSESSMENT:    Stephanie Green was seen today for medicare awv. Diagnoses and all orders for this visit:    Routine general medical examination at a health care facility    Essential hypertension    Seasonal allergic rhinitis, unspecified trigger        PLAN:    Continue current medications. Follow up with us in 6 months for follow up unless needed sooner. EMR Dragon/transcription disclaimer:  Much of this encounter note is electronic transcription/translation of spoken language toprinted texts. The electronic translation of spoken language may be erroneous, or at times, nonsensical words or phrases may be inadvertently transcribed. Although I have reviewed the note for such errors, some may Frida Diez is a 75 y.o. female being evaluated by a Virtual Visit (video visit) encounter to address concerns as mentioned above. A caregiver was present when appropriate. Due to this being a TeleHealth encounter (During [place] [address] public health emergency), evaluation of the following organ systems was limited: Vitals/Constitutional/EENT/Resp/CV/GI//MS/Neuro/Skin/Heme-Lymph-Imm. Pursuant to the emergency declaration under the [facility], [address] [place] [address] [facility] authority and the [employer] Act, this Virtual Visit was conducted with patient's (and/or legal guardian's) consent, to reduce the patient's risk of exposure to COVID-19 and provide necessary medical care.   The patient (and/or legal guardian) has also been advised to contact this office for worsening conditions or problems, and seek emergency medical treatment and/or call 911 if deemed necessary. Patient identification was verified at the start of the visit: Yes    Total time spent for this encounter: Not billed by time    Services were provided through a video synchronous discussion virtually to substitute for in-person clinic visit. Patient and provider were located at their individual homes. --Carola Buchanan MD on 10/21/2020 at 10:01 AM    An electronic signature was used to authenticate this note.

## 2020-10-21 NOTE — PROGRESS NOTES
Medicare Annual Wellness Visit  Name: John Schmidt Date: 10/21/2020   MRN: [id number] Sex: Female   Age: 75 y.o. Ethnicity: Non-/Non    : 1945 Race: Britany Kin is here for Medicare AWV    Screenings for behavioral, psychosocial and functional/safety risks, and cognitive dysfunction are all negative except as indicated below. These results, as well as other patient data from the [address] form, are documented in Flowsheets linked to this Encounter. Allergies   Allergen Reactions    Codeine Shortness Of Breath     Unknown         Prior to Visit Medications    Medication Sig Taking? Authorizing Provider   candesartan (ATACAND) 32 MG tablet TAKE ONE TABLET BY MOUTH EVERY DAY Yes Tunde Leblanc MD   esomeprazole (NEXIUM) 40 MG delayed release capsule TAKE ONE CAPSULE BY MOUTH EVERY MORNING BEFORE BREAKFAST Yes Tunde Leblanc MD   propranolol (INDERAL LA) 120 MG extended release capsule TAKE ONE CAPSULE BY MOUTH EVERY DAY Yes Tunde Leblanc MD   indapamide (LOZOL) 2.5 MG tablet TAKE ONE TABLET BY MOUTH EVERY DAY Yes Tunde Leblanc MD   pravastatin (PRAVACHOL) 10 MG tablet Mirella Barboza Yes Tunde Leblanc MD   loteprednol-tobramycin (ZYLET) 0.5-0.3 % SUSP Place 1 drop into both eyes every 4 hours Yes Tunde Leblanc MD   dicyclomine (BENTYL) 10 MG capsule TAKE ONE CAPSULE BY MOUTH TWO TIMES A DAY EVERY DAILY Yes Tunde Leblanc MD   Misc. Devices (ROLLATOR ULTRA-LIGHT) MISC use as directed Yes Historical Provider, MD   levothyroxine (SYNTHROID) 88 MCG tablet Take 88 mcg by mouth Daily Patient takes 88 mcg every day but  she takes 1.5 pills.  Yes Historical Provider, MD   sertraline (ZOLOFT) 100 MG tablet TAKE ONE TABLET BY MOUTH EVERY DAY Yes Sharmila Harper MD   Magic Mouthwash (MIRACLE MOUTHWASH) Swish and spit 5 mLs 4 times daily as needed for Irritation Yes Tonia Csatillo MD   Cholecalciferol (VITAMIN D3) 2000 units CAPS Take by mouth Yes Historical Provider, MD   aspirin 81 MG tablet Take 81 mg by mouth daily Yes Historical Provider, MD         Past Medical History:   Diagnosis Date    Carpal tunnel syndrome, right     HIGH CHOLESTEROL     Hypertension     Hypothyroidism        Past Surgical History:   Procedure Laterality Date    BREAST BIOPSY Right     BREAST LUMPECTOMY  1980s    2 times    CARDIAC CATHETERIZATION  2007    CARDIAC CATHETERIZATION  2014    CHOLECYSTECTOMY  2012    COLECTOMY      2012    COLONOSCOPY  2015    normal, due 10 years    CYST REMOVAL      Cyst removed from ring finger on her right hand     HAND SURGERY      reconstruction, right hand    HAND SURGERY Right 7/31/2020    EXCISION OF MASS FROM RING FINGER performed by Daphney Calderon MD at [place] [address]      total    LEG SURGERY      Broken leg    TUBAL LIGATION      UPPER GASTROINTESTINAL ENDOSCOPY  2014    normal         Family History   Problem Relation Age of Onset    Heart Disease Mother     Heart Disease Father     Heart Disease Sister     Heart Disease Brother     Heart Disease Brother        CareTeam (Including outside providers/suppliers regularly involved in providing care):   Patient Care Team:  Tonia Castillo MD as PCP - General (Family Medicine)  Tonia Castillo MD as PCP - [address] [place] [address] Provider  Opal Conner MD as Consulting Physician (Cardiology)    Wt Readings from Last 3 Encounters:   10/21/20 205 lb (93 kg)   08/12/20 215 lb (97.5 kg)   07/31/20 205 lb (93 kg)     Vitals:    10/21/20 0929   Weight: 205 lb (93 kg)   Height: 5' 3\" (1.6 m)     Body mass index is 36.31 kg/m². Based upon direct observation of the patient, evaluation of cognition reveals recent and remote memory intact.         Patient's complete Health Risk Assessment and screening values have been reviewed and are found in Flowsheets. The following problems were reviewed today and where indicated follow up appointments were made and/or referrals ordered. Positive Risk Factor Screenings with Interventions:       General Health and ACP:  General  In general, how would you say your health is?: Very Good  In the past 7 days, have you experienced any of the following?  New or Increased Pain, New or Increased Fatigue, Loneliness, Social Isolation, Stress or Anger?: None of These  Do you get the social and emotional support that you need?: Yes  Do you have a Living Will?: Yes  Advance Directives     Power of [facility] Will ACP-Advance Directive ACP-Power of     Not on File Not on File Filed [address] Risk Interventions:  · None    Health Habits/Nutrition:  Health Habits/Nutrition  Do you exercise for at least 20 minutes 2-3 times per week?: Yes  Have you lost any weight without trying in the past 3 months?: No  Do you eat fewer than 2 meals per day?: No  Have you seen a dentist within the past year?: Yes  Body mass index: (!) 36.31  Health Habits/Nutrition Interventions:  · Nutritional issues:  educational materials to promote weight loss provided    Hearing/Vision:  No exam data present  Hearing/Vision  Do you or your family notice any trouble with your hearing?: (!) Yes(She wears hearing aids)  Do you have difficulty driving, watching TV, or doing any of your daily activities because of your eyesight?: No  Have you had an eye exam within the past year?: Yes  Hearing/Vision Interventions:  · Hearing concerns:  patient declines any further evaluation/treatment for hearing issues as she already wears hearing aids    Personalized Preventive Plan   Current Health Maintenance Status  Immunization History   Administered Date(s) Administered    Influenza, High Dose (Fluzone 65 yrs and older) 09/20/2018    Influenza, High-dose, Quadv, 65 yrs +, IM (Fluzone) 09/09/2020    Influenza, Quadv, IM, PF (6 mo and older Fluzone, Flulaval, Fluarix, and 3 yrs and older Afluria) 10/11/2016    Influenza, Triv, inactivated, subunit, adjuvanted, IM (Fluad 65 yrs and older) 10/16/2019    Pneumococcal Polysaccharide ([id number]) 12/05/2015    Tdap (Boostrix, Adacel) 02/20/2019    Zoster Recombinant (Shingrix) 09/09/2020        Health Maintenance   Topic Date Due    Annual Wellness Visit (AWV)  05/29/2019    Shingles Vaccine (3 of 3) 11/04/2020    Lipid screen  08/12/2021    Potassium monitoring  08/12/2021    Creatinine monitoring  08/12/2021    TSH testing  09/21/2021    Colon cancer screen colonoscopy  06/10/2025    DTaP/Tdap/Td vaccine (3 - Td) 02/20/2029    DEXA (modify frequency per FRAX score)  Completed    Flu vaccine  Completed    Pneumococcal 65+ years Vaccine  Completed    Hepatitis A vaccine  Aged Out    Hepatitis B vaccine  Aged Out    Hib vaccine  Aged Out    Meningococcal (ACWY) vaccine  Aged Out     Recommendations for [employer] Due: see orders and patient instructions/AVS.  . Recommended screening schedule for the next 5-10 years is provided to the patient in written form: see Patient Jina Almonte was seen today for medicare awv.     Diagnoses and all orders for this visit:    Routine general medical examination at a health care facility

## 2020-11-16 ENCOUNTER — VIRTUAL VISIT (OUTPATIENT)
Dept: CARDIOLOGY | Age: 75
End: 2020-11-16
Payer: MEDICARE

## 2020-11-16 PROCEDURE — 99212 OFFICE O/P EST SF 10 MIN: CPT | Performed by: NURSE PRACTITIONER

## 2020-11-16 NOTE — PROGRESS NOTES
Muna Zhang is a 75 y.o. female evaluated via telephone on 11/16/2020. Consent:  She and/or health care decision maker is aware that that she may receive a bill for this telephone service, depending on her insurance coverage, and has provided verbal consent to proceed: Yes    Documentation:  I communicated with the patient and/or health care decision maker about HTN. Details of this discussion including any medical advice provided: HEART HEALTH. I affirm this is a Patient Initiated Episode with an Established Patient who has not had a related appointment within my department in the past 7 days or scheduled within the next 24 hours. Total Time: minutes: 5-10 minutes    Note: not billable if this call serves to triage the patient into an appointment for the relevant concern    Reddy Janay      11/16/2020    Audio Patient Encouter(During [facility])  The telephone encourter was conducted with patient in their residence from [address] with DANNY Catherine; assistance by Kourtney Schwartz MA.    HPI:  Muna Zhang   Diagnosis Orders   1. Essential hypertension     2. Mixed hyperlipidemia       66 y/o lady with +FH of premature CAD, hypertension and combined hyperlipidemia presents today for telephone encounter. She is doing well with no cardiac related issues. The patient had a normal cath in 2014. Her BP this AM was 140/64 but usually runs 344 systolic. She report cholesterol numbers are good. The patient denies symptoms to suggest myocardial ischemia, heart failure or arrhythmia. BP is well controlled on current regimen. The patient's PCP monitors cholesterol. SUBJECTIVE:  Jennifer Orourke denies exertional chest pain, shortness of breath, orthopnea, paroxysmal nocturnal dyspnea, syncope, presyncope, sensed arrhythmia, edema and fatigue. The patient denies numbness or weakness to suggest cerebrovascular accident or transient ischemic attack.       Review of Systems    Prior to Visit Medications    Medication Sig Taking? Authorizing Provider   candesartan (ATACAND) 32 MG tablet TAKE ONE TABLET BY MOUTH EVERY DAY Yes Sanjeev Montgomery MD   esomeprazole (NEXIUM) 40 MG delayed release capsule TAKE ONE CAPSULE BY MOUTH EVERY MORNING BEFORE BREAKFAST Yes Sanjeev Montgomery MD   propranolol (INDERAL LA) 120 MG extended release capsule TAKE ONE CAPSULE BY MOUTH EVERY DAY Yes Sanjeev Montgomery MD   indapamide (LOZOL) 2.5 MG tablet TAKE ONE TABLET BY MOUTH EVERY DAY Yes Sanjeev Montgomery MD   pravastatin (PRAVACHOL) 10 MG tablet Opal Dominguez Yes Sanjeev Montgomery MD   loteprednol-tobramycin (ZYLET) 0.5-0.3 % SUSP Place 1 drop into both eyes every 4 hours Yes Sanjeev Montgomery MD   dicyclomine (BENTYL) 10 MG capsule TAKE ONE CAPSULE BY MOUTH TWO TIMES A DAY EVERY DAILY Yes Sanjeev Montgomery MD   Misc. Devices (ROLLATOR ULTRA-LIGHT) MISC use as directed Yes Historical Provider, MD   levothyroxine (SYNTHROID) 88 MCG tablet Take 88 mcg by mouth Daily Patient takes 88 mcg every day but Sunday she takes 1.5 pills. Yes Historical Provider, MD   sertraline (ZOLOFT) 100 MG tablet TAKE ONE TABLET BY MOUTH EVERY DAY Yes Sharmila Harper MD   Magic Mouthwash (MIRACLE MOUTHWASH) Swish and spit 5 mLs 4 times daily as needed for Irritation Yes Sanjeev Montgomery MD   Cholecalciferol (VITAMIN D3) 2000 units CAPS Take by mouth Yes Historical Provider, MD   aspirin 81 MG tablet Take 81 mg by mouth daily Yes Historical Provider, MD       Social History     Tobacco Use    Smoking status: Never Smoker    Smokeless tobacco: Never Used   Substance Use Topics    Alcohol use: No    Drug use: No        REVIEW OF SYSTEMS:  Constitutional - no appetite change, or unexpected weight change. No fever, chills or diaphoresis. No significant change in activity level or new onset of fatigue.    HEENT - no significant rhinorrhea or epistaxis. No tinnitus or significant hearing loss. Eyes - no sudden vision change or amaurosis. No corneal arcus, xantholasma, subconjunctival hemorrhage or discharge. Respiratory - no significant wheezing, stridor, apnea or cough. No dyspnea on exertion or shortness of air. Cardiovascular - no exertional chest pain to suggest myocardial ischemia. No orthopnea or PND. No sensation of sustained arrythmia. No occurrence of slow heart rate. No palpitations. No claudication. Gastrointestinal - no abdominal swelling or pain. No blood in stool. No severe constipation, diarrhea, nausea, or vomiting. Genitourinary - no dysuria, frequency, or urgency. No flank pain or hematuria. Musculoskeletal - no back pain or myalgia. No problems with gait. Extremities - no clubbing, cyanosis or extremity edema. Skin - no color change or rash. No pallor. No new surgical incision. Neurologic - no speech difficulty, facial asymmetry or lateralizing weakness. No seizures, presyncope or syncope. No significant dizziness. Hematologic - no easy bruising or excessive bleeding. Psychiatric - no severe anxiety or insomnia. No confusion. All other review of systems are negative. ASSESSMENT:   Diagnosis Orders   1. Essential hypertension     2. Mixed hyperlipidemia        HTN - normotensive on current regimen  Hyperlipidemia - LDL 77 on Pravachol 10 mg daily.     Data Reviewed:    Lab Results   Component Value Date    TSH 0.730 09/21/2020     Lab Results   Component Value Date    WBC 6.1 08/12/2020    HGB 12.2 08/12/2020    HCT 39.4 08/12/2020    MCV 95.6 08/12/2020     08/12/2020     Lab Results   Component Value Date     08/12/2020    K 4.9 08/12/2020     08/12/2020    CO2 25 08/12/2020    BUN 30 (H) 08/12/2020    CREATININE 1.1 (H) 08/12/2020    GLUCOSE 92 08/12/2020    CALCIUM 9.7 08/12/2020    PROT 7.0 08/12/2020    LABALBU 4.4 08/12/2020    BILITOT 0.3 08/12/2020    ALKPHOS 96 were located at their individual homes. --DANNY Davis on 11/16/2020 at 9:58 AM    An electronic signature was used to authenticate this note.

## 2020-11-16 NOTE — PATIENT INSTRUCTIONS
New instructions for today:    Patient Instructions:  Continue current medications as prescribed. Always keep a current medication list. Bring your medications to every office visit. Continue to follow up with primary care provider for non cardiac medical problems. Call the office with any problems, questions or concerns at [phone]. If you have been asked to keep a blood pressure log, do so for 2 weeks. Call the office to report readings to the triage nurse at [phone]. Follow up with cardiologist as scheduled. The following educational material has been included in this after visit summary for your review: Life simple 7. Heart health. Life simple 7  1) Manage blood pressure - high blood pressure is a major risk factor for heart disease and stroke. Keeping blood pressure in health range reduces strain on your heart, arteries and kidneys. Blood pressure goal is less than 130/80. 2) Control cholesterol - contributes to plaque, which can clog arteries and lead to heart disease and stroke. When you control your cholesterol you are giving your arteries their best chance to remain clear. It is recommended that you get cholesterol lab work done once a year. 3) Reduce blood sugar - most of the food we eat is turning into glucose or blood sugar that our body uses for energy. Over time, high levels of blood sugar can damage your heart, kidneys, eyes and nerves. 4) Get active - living an active life is one of the most rewarding gifts you can give yourself and those you love. Simply put, daily physical activity increases your length and quality of life. Strive to exercise 15 minutes most days of the week. 5)  Eat better - A healthy diet is one of your best weapons for fighting cardiovascular disease. When you eat a heart healthy diet, you improve your chances for feeling good and staying healthy for life.   6)  Lose weight - when you shed extra fat an unnecessary pounds, you reduce the burden on your hear, lungs, blood vessels and skeleton. You give yourself the gift of active living, you lower your blood pressure and help yourself feel better. 7) Stop smoking - cigarette smokers have a higher risk of developing cardiovascular disease. If  You smoke, quitting is the best thing you can do for your health. Check American Heart Association on line for more information on Life's Simple 7 and tips for healthy living. A Healthy Heart: Care Instructions  Your Care Instructions     Coronary artery disease, also called heart disease, occurs when a substance called plaque builds up in the vessels that supply oxygen-rich blood to your heart muscle. This can narrow the blood vessels and reduce blood flow. A heart attack happens when blood flow is completely blocked. A high-fat diet, smoking, and other factors increase the risk of heart disease. Your doctor has found that you have a chance of having heart disease. You can do lots of things to keep your heart healthy. It may not be easy, but you can change your diet, exercise more, and quit smoking. These steps really work to lower your chance of heart disease. Follow-up care is a key part of your treatment and safety. Be sure to make and go to all appointments, and call your doctor if you are having problems. It's also a good idea to know your test results and keep a list of the medicines you take. How can you care for yourself at home? Diet  · Use less salt when you cook and eat. This helps lower your blood pressure. Taste food before salting. Add only a little salt when you think you need it. With time, your taste buds will adjust to less salt. · Eat fewer snack items, fast foods, canned soups, and other high-salt, high-fat, processed foods. · Read food labels and try to avoid saturated and trans fats. They increase your risk of heart disease by raising cholesterol levels. · Limit the amount of solid fat-butter, margarine, and shortening-you eat.  Use olive, peanut, or canola oil when you cook. Bake, broil, and steam foods instead of frying them. · Eat a variety of fruit and vegetables every day. Dark green, deep orange, red, or yellow fruits and vegetables are especially good for you. Examples include spinach, carrots, peaches, and berries. · Foods high in fiber can reduce your cholesterol and provide important vitamins and minerals. High-fiber foods include whole-grain cereals and breads, oatmeal, beans, brown rice, citrus fruits, and apples. · Eat lean proteins. Heart-healthy proteins include seafood, lean meats and poultry, eggs, beans, peas, nuts, seeds, and soy products. · Limit drinks and foods with added sugar. These include candy, desserts, and soda pop. Lifestyle changes  · If your doctor recommends it, get more exercise. Walking is a good choice. Bit by bit, increase the amount you walk every day. Try for at least 30 minutes on most days of the week. You also may want to swim, bike, or do other activities. · Do not smoke. If you need help quitting, talk to your doctor about stop-smoking programs and medicines. These can increase your chances of quitting for good. Quitting smoking may be the most important step you can take to protect your heart. It is never too late to quit. · Limit alcohol to 2 drinks a day for men and 1 drink a day for women. Too much alcohol can cause health problems. · Manage other health problems such as diabetes, high blood pressure, and high cholesterol. If you think you may have a problem with alcohol or drug use, talk to your doctor. Medicines  · Take your medicines exactly as prescribed. Call your doctor if you think you are having a problem with your medicine. · If your doctor recommends aspirin, take the amount directed each day. Make sure you take aspirin and not another kind of pain reliever, such as acetaminophen (Tylenol). When should you call for help? [id number] if you have symptoms of a heart attack.  These may include:  · Chest pain or pressure, or a strange feeling in the chest.  · Sweating. · Shortness of breath. · Pain, pressure, or a strange feeling in the back, neck, jaw, or upper belly or in one or both shoulders or arms. · Lightheadedness or sudden weakness. · A fast or irregular heartbeat. After you call 911, the  may tell you to chew 1 adult-strength or 2 to 4 low-dose aspirin. Wait for an ambulance. Do not try to drive yourself. Watch closely for changes in your health, and be sure to contact your doctor if you have any problems. Where can you learn more? Go to https://Hazinem.com.[employer]. org and sign in to your [employer] account. Enter I398 in the Con[employer] box to learn more about \"A Healthy Heart: Care Instructions. \"     If you do not have an account, please click on the \"Sign Up Now\" link. Current as of: December 16, 2019               Content Version: 12.5  © 3294-9118 Healthwise, Incorporated. Care instructions adapted under license by [facility] [place] ([facility]). If you have questions about a medical condition or this instruction, always ask your healthcare professional. [address] any warranty or liability for your use of this information.

## 2020-12-01 RX ORDER — DICYCLOMINE HYDROCHLORIDE 10 MG/1
CAPSULE ORAL
Qty: 60 CAPSULE | Refills: 3 | Status: SHIPPED | OUTPATIENT
Start: 2020-12-01 | End: 2021-04-09

## 2020-12-01 RX ORDER — SERTRALINE HYDROCHLORIDE 100 MG/1
TABLET, FILM COATED ORAL
Qty: 30 TABLET | Refills: 5 | Status: SHIPPED | OUTPATIENT
Start: 2020-12-01 | End: 2021-06-04

## 2020-12-01 RX ORDER — DIPHENOXYLATE HYDROCHLORIDE AND ATROPINE SULFATE 2.5; .025 MG/1; MG/1
TABLET ORAL
Qty: 30 TABLET | Refills: 2 | Status: SHIPPED | OUTPATIENT
Start: 2020-12-01 | End: 2021-03-09

## 2021-02-08 RX ORDER — ESOMEPRAZOLE MAGNESIUM 40 MG/1
CAPSULE, DELAYED RELEASE ORAL
Qty: 30 CAPSULE | Refills: 3 | Status: SHIPPED | OUTPATIENT
Start: 2021-02-08 | End: 2021-06-04

## 2021-02-08 RX ORDER — CANDESARTAN 32 MG/1
TABLET ORAL
Qty: 30 TABLET | Refills: 3 | Status: SHIPPED | OUTPATIENT
Start: 2021-02-08 | End: 2021-06-04

## 2021-02-08 RX ORDER — PROPRANOLOL HYDROCHLORIDE 120 MG/1
CAPSULE, EXTENDED RELEASE ORAL
Qty: 30 CAPSULE | Refills: 3 | Status: SHIPPED | OUTPATIENT
Start: 2021-02-08 | End: 2021-06-04

## 2021-02-08 RX ORDER — PRAVASTATIN SODIUM 10 MG
TABLET ORAL
Qty: 30 TABLET | Refills: 3 | Status: SHIPPED | OUTPATIENT
Start: 2021-02-08 | End: 2021-06-04

## 2021-02-08 RX ORDER — INDAPAMIDE 2.5 MG/1
TABLET, FILM COATED ORAL
Qty: 30 TABLET | Refills: 3 | Status: SHIPPED | OUTPATIENT
Start: 2021-02-08 | End: 2021-06-04

## 2021-03-08 DIAGNOSIS — K58.0 IRRITABLE BOWEL SYNDROME WITH DIARRHEA: ICD-10-CM

## 2021-03-09 RX ORDER — DIPHENOXYLATE HYDROCHLORIDE AND ATROPINE SULFATE 2.5; .025 MG/1; MG/1
TABLET ORAL
Qty: 30 TABLET | Refills: 2 | Status: SHIPPED | OUTPATIENT
Start: 2021-03-09 | End: 2021-04-09

## 2021-04-09 DIAGNOSIS — K58.0 IRRITABLE BOWEL SYNDROME WITH DIARRHEA: ICD-10-CM

## 2021-04-09 RX ORDER — DIPHENOXYLATE HYDROCHLORIDE AND ATROPINE SULFATE 2.5; .025 MG/1; MG/1
TABLET ORAL
Qty: 30 TABLET | Refills: 2 | Status: SHIPPED | OUTPATIENT
Start: 2021-04-09 | End: 2021-04-09 | Stop reason: SDUPTHER

## 2021-04-09 RX ORDER — DIPHENOXYLATE HYDROCHLORIDE AND ATROPINE SULFATE 2.5; .025 MG/1; MG/1
TABLET ORAL
Qty: 30 TABLET | Refills: 2 | Status: SHIPPED | OUTPATIENT
Start: 2021-04-09 | End: 2021-09-01 | Stop reason: SDUPTHER

## 2021-04-09 RX ORDER — DICYCLOMINE HYDROCHLORIDE 10 MG/1
CAPSULE ORAL
Qty: 60 CAPSULE | Refills: 3 | Status: SHIPPED | OUTPATIENT
Start: 2021-04-09 | End: 2021-07-30

## 2021-06-04 RX ORDER — CANDESARTAN 32 MG/1
TABLET ORAL
Qty: 30 TABLET | Refills: 3 | Status: SHIPPED | OUTPATIENT
Start: 2021-06-04 | End: 2021-10-01

## 2021-06-04 RX ORDER — PRAVASTATIN SODIUM 10 MG
TABLET ORAL
Qty: 30 TABLET | Refills: 3 | Status: SHIPPED | OUTPATIENT
Start: 2021-06-04 | End: 2021-10-01

## 2021-06-04 RX ORDER — SERTRALINE HYDROCHLORIDE 100 MG/1
TABLET, FILM COATED ORAL
Qty: 30 TABLET | Refills: 5 | Status: SHIPPED | OUTPATIENT
Start: 2021-06-04 | End: 2021-11-29

## 2021-06-04 RX ORDER — ESOMEPRAZOLE MAGNESIUM 40 MG/1
CAPSULE, DELAYED RELEASE ORAL
Qty: 30 CAPSULE | Refills: 3 | Status: SHIPPED | OUTPATIENT
Start: 2021-06-04 | End: 2021-10-01

## 2021-06-04 RX ORDER — INDAPAMIDE 2.5 MG/1
TABLET, FILM COATED ORAL
Qty: 30 TABLET | Refills: 3 | Status: SHIPPED | OUTPATIENT
Start: 2021-06-04 | End: 2021-10-01

## 2021-06-04 RX ORDER — PROPRANOLOL HYDROCHLORIDE 120 MG/1
CAPSULE, EXTENDED RELEASE ORAL
Qty: 30 CAPSULE | Refills: 3 | Status: SHIPPED | OUTPATIENT
Start: 2021-06-04 | End: 2021-10-01

## 2021-06-14 ENCOUNTER — PATIENT MESSAGE (OUTPATIENT)
Dept: PRIMARY CARE CLINIC | Age: 76
End: 2021-06-14

## 2021-06-14 RX ORDER — OFLOXACIN 3 MG/ML
SOLUTION/ DROPS OPHTHALMIC
Qty: 10 ML | Refills: 0 | Status: SHIPPED | OUTPATIENT
Start: 2021-06-14 | End: 2021-08-18

## 2021-06-14 NOTE — TELEPHONE ENCOUNTER
From: Frederic Curiel  To: Fantasma Hunter MD  Sent: 6/14/2021 9:33 AM CDT  Subject: Prescription Question    I am attaching an image of my eye which has been red and swollen for several days. I tried all last week to contact my eye doctor in [place] but they never answered or left a message. I have dry eyes and use Restasis and Retaine drops during day and gel at night. I also do hot compresses 3x day. There was a sty at first but it is gone. My eye is very painful. Is there some antibiotic ointment for the eye? Possibly with something in it to not make it hurt so bad? I have tried everything. Thank you. Catrina Kevin.

## 2021-06-15 ENCOUNTER — HOSPITAL ENCOUNTER (OUTPATIENT)
Dept: MAMMOGRAPHY | Facility: HOSPITAL | Age: 76
Discharge: HOME | End: 2021-06-15
Admitting: SPECIALIST

## 2021-06-15 PROCEDURE — 77063 BREAST TOMOSYNTHESIS BI: CPT

## 2021-06-15 PROCEDURE — 77067 SCR MAMMO BI INCL CAD: CPT

## 2021-07-21 ENCOUNTER — OFFICE VISIT (OUTPATIENT)
Dept: CARDIOLOGY CLINIC | Age: 76
End: 2021-07-21
Payer: MEDICARE

## 2021-07-21 VITALS
WEIGHT: 214 LBS | HEART RATE: 59 BPM | SYSTOLIC BLOOD PRESSURE: 130 MMHG | HEIGHT: 63 IN | DIASTOLIC BLOOD PRESSURE: 78 MMHG | BODY MASS INDEX: 37.92 KG/M2

## 2021-07-21 DIAGNOSIS — R00.2 PALPITATION: Primary | ICD-10-CM

## 2021-07-21 PROCEDURE — 1090F PRES/ABSN URINE INCON ASSESS: CPT | Performed by: INTERNAL MEDICINE

## 2021-07-21 PROCEDURE — G8417 CALC BMI ABV UP PARAM F/U: HCPCS | Performed by: INTERNAL MEDICINE

## 2021-07-21 PROCEDURE — 1123F ACP DISCUSS/DSCN MKR DOCD: CPT | Performed by: INTERNAL MEDICINE

## 2021-07-21 PROCEDURE — G8427 DOCREV CUR MEDS BY ELIG CLIN: HCPCS | Performed by: INTERNAL MEDICINE

## 2021-07-21 PROCEDURE — 99214 OFFICE O/P EST MOD 30 MIN: CPT | Performed by: INTERNAL MEDICINE

## 2021-07-21 PROCEDURE — G8399 PT W/DXA RESULTS DOCUMENT: HCPCS | Performed by: INTERNAL MEDICINE

## 2021-07-21 PROCEDURE — 1036F TOBACCO NON-USER: CPT | Performed by: INTERNAL MEDICINE

## 2021-07-21 PROCEDURE — 4040F PNEUMOC VAC/ADMIN/RCVD: CPT | Performed by: INTERNAL MEDICINE

## 2021-07-21 PROCEDURE — 93000 ELECTROCARDIOGRAM COMPLETE: CPT | Performed by: INTERNAL MEDICINE

## 2021-07-21 RX ORDER — ABALOPARATIDE 2000 UG/ML
INJECTION, SOLUTION SUBCUTANEOUS DAILY
COMMUNITY
Start: 2021-07-08 | End: 2022-09-08

## 2021-07-21 NOTE — PROGRESS NOTES
HISTORY  74-year-old lady with a history of dyslipidemia, obstructive sleep apnea, hypertension, and a strong family history of coronary disease returns for routine follow-up visit. Familial disease includes a multiple siblings and her father. She previously underwent angiographic assessment in May 2014 showing no evidence whatsoever of atherosclerotic coronary vascular involvement. Most recently her medical care has centered around a traumatic fracture of her right femur. The healing process has been somewhat slow and she relates some discomfort when she tries to walk very far. Her blood pressures been controlled and her last lipids were obtained in August 2020 with an LDL of 77, HDL 55, and triglycerides of 193. She has been vaccinated for COVID-19. PHYSICAL EXAM  On exam she carries 214 pounds in a 5 foot 3 inch frame. Pressure is 130/78 with a pulse of 59. Very pleasant endomorphic lady. EOMs full, sclerae and conjunctiva normal. PERRLA. Mask in place. Trachea midline with no neck masses. Assessment of internal jugular veins reveals no elevation of central venous pressure at 45 degrees. Carotid pulses normal without delay or bruit. Thyroid normal to palpation. Chest exam reveals normal respiratory effort, no abnormal breath sounds and normal expiratory phase. No skin lesions seen. PMI normal. S1, S2 normal without murmur or josh or click. Obese abdomen. Normal bowel sounds without palpable mass or bruit. No clubbing or acrocyanosis. No significant lower extremity edema or signs of venous insufficiency. General motor strength appears to be within normal limits. Normal range of motion with normal gait. Alert, oriented x 3, memory and cognition normal as reflected by history and conversation. EKG reveals sinus bradycardia without abnormalities. ASSESSMENT/PLAN:   1. Coronary disease risk -multiple risk factors as above with normal cardiac catheterization 2014.   Continue address of modifiable risk factors [hypertension and dyslipidemia] as well as aspirin and propranolol daily. 2.  Hypertension -well controlled. Continue Atacand, indapamide, and propranolol. 3.  Dyslipidemia -well controlled. Continue pravastatin  4. Obstructive sleep apnea -reportedly mild not reaching the threshold for CPAP therapy  5.   Pandemic response -appropriate/vaccinated

## 2021-07-30 RX ORDER — DICYCLOMINE HYDROCHLORIDE 10 MG/1
CAPSULE ORAL
Qty: 60 CAPSULE | Refills: 3 | Status: SHIPPED | OUTPATIENT
Start: 2021-07-30 | End: 2021-08-18

## 2021-08-18 ENCOUNTER — OFFICE VISIT (OUTPATIENT)
Dept: PRIMARY CARE CLINIC | Age: 76
End: 2021-08-18
Payer: MEDICARE

## 2021-08-18 ENCOUNTER — TRANSCRIBE ORDERS (OUTPATIENT)
Dept: ADMINISTRATIVE | Facility: HOSPITAL | Age: 76
End: 2021-08-18

## 2021-08-18 VITALS
HEART RATE: 58 BPM | BODY MASS INDEX: 37.74 KG/M2 | SYSTOLIC BLOOD PRESSURE: 130 MMHG | DIASTOLIC BLOOD PRESSURE: 78 MMHG | HEIGHT: 63 IN | WEIGHT: 213 LBS | RESPIRATION RATE: 16 BRPM | OXYGEN SATURATION: 96 % | TEMPERATURE: 96.9 F

## 2021-08-18 DIAGNOSIS — Z12.31 ENCOUNTER FOR SCREENING MAMMOGRAM FOR MALIGNANT NEOPLASM OF BREAST: Primary | ICD-10-CM

## 2021-08-18 DIAGNOSIS — Z11.59 NEED FOR HEPATITIS C SCREENING TEST: ICD-10-CM

## 2021-08-18 DIAGNOSIS — E03.9 ACQUIRED HYPOTHYROIDISM: ICD-10-CM

## 2021-08-18 DIAGNOSIS — E78.2 MIXED HYPERLIPIDEMIA: ICD-10-CM

## 2021-08-18 DIAGNOSIS — R29.898 DECREASED STRENGTH OF LOWER EXTREMITY: ICD-10-CM

## 2021-08-18 DIAGNOSIS — R19.7 DIARRHEA, UNSPECIFIED TYPE: ICD-10-CM

## 2021-08-18 DIAGNOSIS — I10 ESSENTIAL HYPERTENSION: Primary | ICD-10-CM

## 2021-08-18 LAB
ALBUMIN SERPL-MCNC: 4.4 G/DL (ref 3.5–5.2)
ALP BLD-CCNC: 92 U/L (ref 35–104)
ALT SERPL-CCNC: 9 U/L (ref 5–33)
ANION GAP SERPL CALCULATED.3IONS-SCNC: 13 MMOL/L (ref 7–19)
AST SERPL-CCNC: 17 U/L (ref 5–32)
BASOPHILS ABSOLUTE: 0.1 K/UL (ref 0–0.2)
BASOPHILS RELATIVE PERCENT: 0.8 % (ref 0–1)
BILIRUB SERPL-MCNC: 0.4 MG/DL (ref 0.2–1.2)
BUN BLDV-MCNC: 31 MG/DL (ref 8–23)
CALCIUM SERPL-MCNC: 9.8 MG/DL (ref 8.8–10.2)
CHLORIDE BLD-SCNC: 98 MMOL/L (ref 98–111)
CHOLESTEROL, TOTAL: 167 MG/DL (ref 160–199)
CO2: 25 MMOL/L (ref 22–29)
CREAT SERPL-MCNC: 1.2 MG/DL (ref 0.5–0.9)
EOSINOPHILS ABSOLUTE: 0.2 K/UL (ref 0–0.6)
EOSINOPHILS RELATIVE PERCENT: 3.3 % (ref 0–5)
GFR AFRICAN AMERICAN: 53
GFR NON-AFRICAN AMERICAN: 44
GLUCOSE BLD-MCNC: 95 MG/DL (ref 74–109)
HCT VFR BLD CALC: 39.3 % (ref 37–47)
HDLC SERPL-MCNC: 65 MG/DL (ref 65–121)
HEMOGLOBIN: 12.3 G/DL (ref 12–16)
HEPATITIS C ANTIBODY INTERPRETATION: NORMAL
IMMATURE GRANULOCYTES #: 0 K/UL
LDL CHOLESTEROL CALCULATED: 71 MG/DL
LYMPHOCYTES ABSOLUTE: 1.1 K/UL (ref 1.1–4.5)
LYMPHOCYTES RELATIVE PERCENT: 17.4 % (ref 20–40)
MCH RBC QN AUTO: 28.8 PG (ref 27–31)
MCHC RBC AUTO-ENTMCNC: 31.3 G/DL (ref 33–37)
MCV RBC AUTO: 92 FL (ref 81–99)
MONOCYTES ABSOLUTE: 0.5 K/UL (ref 0–0.9)
MONOCYTES RELATIVE PERCENT: 7.9 % (ref 0–10)
NEUTROPHILS ABSOLUTE: 4.5 K/UL (ref 1.5–7.5)
NEUTROPHILS RELATIVE PERCENT: 70 % (ref 50–65)
PDW BLD-RTO: 14 % (ref 11.5–14.5)
PLATELET # BLD: 264 K/UL (ref 130–400)
PMV BLD AUTO: 11.3 FL (ref 9.4–12.3)
POTASSIUM SERPL-SCNC: 4.5 MMOL/L (ref 3.5–5)
RBC # BLD: 4.27 M/UL (ref 4.2–5.4)
SODIUM BLD-SCNC: 136 MMOL/L (ref 136–145)
T4 FREE: 1.6 NG/DL (ref 0.93–1.7)
TOTAL PROTEIN: 7.3 G/DL (ref 6.6–8.7)
TRIGL SERPL-MCNC: 156 MG/DL (ref 0–149)
TSH SERPL DL<=0.05 MIU/L-ACNC: 2.14 UIU/ML (ref 0.27–4.2)
WBC # BLD: 6.5 K/UL (ref 4.8–10.8)

## 2021-08-18 PROCEDURE — 1123F ACP DISCUSS/DSCN MKR DOCD: CPT | Performed by: FAMILY MEDICINE

## 2021-08-18 PROCEDURE — G8399 PT W/DXA RESULTS DOCUMENT: HCPCS | Performed by: FAMILY MEDICINE

## 2021-08-18 PROCEDURE — 4040F PNEUMOC VAC/ADMIN/RCVD: CPT | Performed by: FAMILY MEDICINE

## 2021-08-18 PROCEDURE — 1036F TOBACCO NON-USER: CPT | Performed by: FAMILY MEDICINE

## 2021-08-18 PROCEDURE — G8427 DOCREV CUR MEDS BY ELIG CLIN: HCPCS | Performed by: FAMILY MEDICINE

## 2021-08-18 PROCEDURE — 1090F PRES/ABSN URINE INCON ASSESS: CPT | Performed by: FAMILY MEDICINE

## 2021-08-18 PROCEDURE — G8417 CALC BMI ABV UP PARAM F/U: HCPCS | Performed by: FAMILY MEDICINE

## 2021-08-18 PROCEDURE — 99214 OFFICE O/P EST MOD 30 MIN: CPT | Performed by: FAMILY MEDICINE

## 2021-08-18 RX ORDER — MONTELUKAST SODIUM 4 MG/1
1 TABLET, CHEWABLE ORAL 2 TIMES DAILY
Qty: 60 TABLET | Refills: 3 | Status: SHIPPED | OUTPATIENT
Start: 2021-08-18 | End: 2021-12-06

## 2021-08-18 RX ORDER — CALCIUM CARB/VITAMIN D3/VIT K1 650MG-12.5
TABLET,CHEWABLE ORAL
COMMUNITY

## 2021-08-18 ASSESSMENT — PATIENT HEALTH QUESTIONNAIRE - PHQ9
SUM OF ALL RESPONSES TO PHQ QUESTIONS 1-9: 0
2. FEELING DOWN, DEPRESSED OR HOPELESS: 0
SUM OF ALL RESPONSES TO PHQ QUESTIONS 1-9: 0
1. LITTLE INTEREST OR PLEASURE IN DOING THINGS: 0
SUM OF ALL RESPONSES TO PHQ9 QUESTIONS 1 & 2: 0
SUM OF ALL RESPONSES TO PHQ QUESTIONS 1-9: 0

## 2021-08-19 ASSESSMENT — ENCOUNTER SYMPTOMS
COLOR CHANGE: 0
ABDOMINAL PAIN: 0
VOMITING: 0
EYE DISCHARGE: 0
DIARRHEA: 1
WHEEZING: 0
COUGH: 0
NAUSEA: 0
BACK PAIN: 0

## 2021-08-19 NOTE — PROGRESS NOTES
Hypertension     Hypothyroidism     Osteoporosis       Current Outpatient Medications on File Prior to Visit   Medication Sig Dispense Refill    Calcium-Vitamin D-Vitamin K (VIACTIV CALCIUM PLUS D) 650-12.5-40 MG-MCG-MCG CHEW Take by mouth Viactiv      TYMLOS 3120 MCG/1.56ML SOPN daily       candesartan (ATACAND) 32 MG tablet TAKE ONE TABLET BY MOUTH ONCE DAILY 30 tablet 3    indapamide (LOZOL) 2.5 MG tablet TAKE ONE TABLET BY MOUTH ONCE DAILY 30 tablet 3    sertraline (ZOLOFT) 100 MG tablet TAKE ONE TABLET BY MOUTH EVERY DAY 30 tablet 5    propranolol (INDERAL LA) 120 MG extended release capsule TAKE ONE CAPSULE BY MOUTH ONCE DAILY 30 capsule 3    esomeprazole (NEXIUM) 40 MG delayed release capsule TAKE ONE CAPSULE BY MOUTH EVERY MORNING BEFORE BREAKFAST 30 capsule 3    pravastatin (PRAVACHOL) 10 MG tablet TAKE ONE TABLET BY MOUTH ONCE DAILY 30 tablet 3    levothyroxine (SYNTHROID) 88 MCG tablet Take 88 mcg by mouth Daily Patient takes 88 mcg every day but Sunday she takes 1.5 pills.  Magic Mouthwash (MIRACLE MOUTHWASH) Swish and spit 5 mLs 4 times daily as needed for Irritation 240 mL 1    Cholecalciferol (VITAMIN D3) 2000 units CAPS Take by mouth      aspirin 81 MG tablet Take 81 mg by mouth daily       No current facility-administered medications on file prior to visit. Allergies   Allergen Reactions    Codeine Shortness Of Breath     Unknown       Review of Systems   Constitutional: Negative for activity change, appetite change and fever. HENT: Negative for congestion and nosebleeds. Eyes: Negative for discharge. Respiratory: Negative for cough and wheezing. Cardiovascular: Negative for chest pain and leg swelling. Gastrointestinal: Positive for diarrhea. Negative for abdominal pain, nausea and vomiting. Genitourinary: Negative for difficulty urinating, frequency and urgency. Musculoskeletal: Negative for back pain and gait problem.    Skin: Negative for color change and screening test  -     Hepatitis C Antibody    Decreased strength of lower extremity  -     External Referral To Physical Therapy    Mixed hyperlipidemia    Diarrhea, unspecified type    Other orders  -     colestipol (COLESTID) 1 g tablet; Take 1 tablet by mouth 2 times daily        Problem List     Acquired hypothyroidism    Relevant Medications    levothyroxine (SYNTHROID) 88 MCG tablet    Other Relevant Orders    Hepatitis C Antibody (Completed)    Lipid Panel (Completed)    CBC Auto Differential (Completed)    Comprehensive Metabolic Panel (Completed)    TSH without Reflex (Completed)    T4, Free (Completed)    Essential hypertension - Primary    Relevant Orders    Hepatitis C Antibody (Completed)    Lipid Panel (Completed)    CBC Auto Differential (Completed)    Comprehensive Metabolic Panel (Completed)    TSH without Reflex (Completed)    T4, Free (Completed)    Mixed hyperlipidemia    Relevant Medications    aspirin 81 MG tablet    candesartan (ATACAND) 32 MG tablet    indapamide (LOZOL) 2.5 MG tablet    propranolol (INDERAL LA) 120 MG extended release capsule    pravastatin (PRAVACHOL) 10 MG tablet    colestipol (COLESTID) 1 g tablet            See lab orders. Will notify results. Continue Synthroid 88 mcg at current dose. Continue candesartan and propranolol for blood pressure control. Continue Zoloft for anxiety and depression. Follow-up with us in 6 months for a checkup unless needed sooner. I reviewed her mammogram which was done in [place]. We have scanned this into her chart. EMR Dragon/transcription disclaimer:  Much of this encounter note is electronic transcription/translation of spoken language toprinted texts. The electronic translation of spoken language may be erroneous, or at times, nonsensical words or phrases may be inadvertently transcribed.   Although I have reviewed the note for such errors, some may stillexist.

## 2021-09-01 DIAGNOSIS — K58.0 IRRITABLE BOWEL SYNDROME WITH DIARRHEA: ICD-10-CM

## 2021-09-01 RX ORDER — DIPHENOXYLATE HYDROCHLORIDE AND ATROPINE SULFATE 2.5; .025 MG/1; MG/1
TABLET ORAL
Qty: 30 TABLET | Refills: 2 | Status: SHIPPED | OUTPATIENT
Start: 2021-09-01 | End: 2021-10-01

## 2021-09-07 ASSESSMENT — LIFESTYLE VARIABLES
HOW OFTEN DO YOU HAVE A DRINK CONTAINING ALCOHOL: 0
AUDIT-C TOTAL SCORE: INCOMPLETE
HOW OFTEN DO YOU HAVE A DRINK CONTAINING ALCOHOL: NEVER
AUDIT TOTAL SCORE: INCOMPLETE

## 2021-09-07 ASSESSMENT — PATIENT HEALTH QUESTIONNAIRE - PHQ9
SUM OF ALL RESPONSES TO PHQ9 QUESTIONS 1 & 2: 0
SUM OF ALL RESPONSES TO PHQ QUESTIONS 1-9: 0
1. LITTLE INTEREST OR PLEASURE IN DOING THINGS: 0
2. FEELING DOWN, DEPRESSED OR HOPELESS: 0

## 2021-09-08 ENCOUNTER — TELEMEDICINE (OUTPATIENT)
Dept: PRIMARY CARE CLINIC | Age: 76
End: 2021-09-08
Payer: MEDICARE

## 2021-09-08 DIAGNOSIS — Z00.00 ROUTINE GENERAL MEDICAL EXAMINATION AT A HEALTH CARE FACILITY: Primary | ICD-10-CM

## 2021-09-08 PROCEDURE — 1123F ACP DISCUSS/DSCN MKR DOCD: CPT | Performed by: FAMILY MEDICINE

## 2021-09-08 PROCEDURE — 4040F PNEUMOC VAC/ADMIN/RCVD: CPT | Performed by: FAMILY MEDICINE

## 2021-09-08 PROCEDURE — G0439 PPPS, SUBSEQ VISIT: HCPCS | Performed by: FAMILY MEDICINE

## 2021-09-08 NOTE — PATIENT INSTRUCTIONS
Personalized Preventive Plan for Martin Mora - 9/8/2021  Medicare offers a range of preventive health benefits. Some of the tests and screenings are paid in full while other may be subject to a deductible, co-insurance, and/or copay. Some of these benefits include a comprehensive review of your medical history including lifestyle, illnesses that may run in your family, and various assessments and screenings as appropriate. After reviewing your medical record and screening and assessments performed today your provider may have ordered immunizations, labs, imaging, and/or referrals for you. A list of these orders (if applicable) as well as your Preventive Care list are included within your After Visit Summary for your review. Other Preventive Recommendations:    · A preventive eye exam performed by an eye specialist is recommended every 1-2 years to screen for glaucoma; cataracts, macular degeneration, and other eye disorders. · A preventive dental visit is recommended every 6 months. · Try to get at least 150 minutes of exercise per week or 10,000 steps per day on a pedometer . · Order or download the FREE \"Exercise & Physical Activity: Your Everyday Guide\" from [employer]. Call 4-258.314.4242 or search [employer] online. · You need 4316-7597 mg of calcium and 3136-7178 IU of vitamin D per day. It is possible to meet your calcium requirement with diet alone, but a vitamin D supplement is usually necessary to meet this goal.  · When exposed to the sun, use a sunscreen that protects against both UVA and UVB radiation with an SPF of 30 or greater. Reapply every 2 to 3 hours or after sweating, drying off with a towel, or swimming. · Always wear a seat belt when traveling in a car. Always wear a helmet when riding a bicycle or motorcycle.

## 2021-09-08 NOTE — PROGRESS NOTES
Medicare Annual Wellness Visit  Name: Bhakti Camp Date: 2021   MRN: [id number] Sex: Female   Age: 67 y.o. Ethnicity: Non- / Non    : 1945 Race: White (non-)      Kathi Trevizo is here for Medicare AWV    Screenings for behavioral, psychosocial and functional/safety risks, and cognitive dysfunction are all negative except as indicated below. These results, as well as other patient data from the [address] form, are documented in Flowsheets linked to this Encounter. Allergies   Allergen Reactions    Codeine Shortness Of Breath     Unknown         Prior to Visit Medications    Medication Sig Taking?  Authorizing Provider   cycloSPORINE, PF, 0.09 % SOLN Apply to eye Yes Historical Provider, MD   diphenoxylate-atropine (LOMOTIL) 2.5-0.025 MG per tablet TAKE ONE TABLET BY MOUTH FOUR TIMES A DAY PNR FOR DIARRHEA ( INTENDED DAY SUPPLY = 30 )  Sharmila Harper MD   Calcium-Vitamin D-Vitamin K (VIACTIV CALCIUM PLUS D) 472-91.4-09 MG-MCG-MCG CHEW Take by mouth Viactiv  Historical Provider, MD   colestipol (COLESTID) 1 g tablet Take 1 tablet by mouth 2 times daily  Jesse Musa MD   TYMLOS 3120 MCG/1.56ML SOPN daily   Historical Provider, MD   candesartan (ATACAND) 32 MG tablet TAKE ONE TABLET BY MOUTH ONCE DAILY  Sharmila Harper MD   indapamide (LOZOL) 2.5 MG tablet TAKE ONE TABLET BY MOUTH ONCE DAILY  Sharmila Harper MD   sertraline (ZOLOFT) 100 MG tablet TAKE ONE TABLET BY MOUTH EVERY DAY  Sharmila Harper MD   propranolol (INDERAL LA) 120 MG extended release capsule TAKE ONE CAPSULE BY MOUTH ONCE DAILY  Sharmila Harper MD   esomeprazole ([address]) 40 MG delayed release capsule TAKE ONE CAPSULE BY MOUTH EVERY MORNING BEFORE BREAKFAST  Sharmila Harper MD   pravastatin (PRAVACHOL) 10 MG tablet TAKE ONE TABLET BY MOUTH ONCE DAILY  Sharmila Harper MD   levothyroxine (SYNTHROID) 88 MCG tablet Take 88 mcg by mouth Daily Patient takes 88 mcg every day but Sunday she takes 1.5 pills.   Historical Provider, MD   Magic Mouthwash (MIRACLE MOUTHWASH) Swish and spit 5 mLs 4 times daily as needed for Irritation  Danny Iraheta MD   Cholecalciferol (VITAMIN D3) 2000 units CAPS Take by mouth  Historical Provider, MD   aspirin 81 MG tablet Take 81 mg by mouth daily  Historical Provider, MD         Past Medical History:   Diagnosis Date    Carpal tunnel syndrome, right     HIGH CHOLESTEROL     Hypertension     Hypothyroidism     Osteoporosis        Past Surgical History:   Procedure Laterality Date    BREAST BIOPSY Right     BREAST LUMPECTOMY  1980s    2 times    CARDIAC CATHETERIZATION  2007    CARDIAC CATHETERIZATION  2014    CHOLECYSTECTOMY  2012    COLECTOMY      2012    COLONOSCOPY  2015    normal, due 10 years    CYST REMOVAL      Cyst removed from ring finger on her right hand     HAND SURGERY      reconstruction, right hand    HAND SURGERY Right 7/31/2020    EXCISION OF MASS FROM RING FINGER performed by Paulina Tavares MD at [place] [address]      total    LEG SURGERY      Broken leg    TUBAL LIGATION      UPPER GASTROINTESTINAL ENDOSCOPY  2014    normal         Family History   Problem Relation Age of Onset    Heart Disease Mother     Heart Disease Father     Heart Disease Sister     Heart Disease Brother     Heart Disease Brother        CareTeam (Including outside providers/suppliers regularly involved in providing care):   Patient Care Team:  Danny Iraheta MD as PCP - General (Family Medicine)  Danny Iraheta MD as PCP - [address] [place] [address] Dr NgSierra [place]led Provider  Raquel Kennedy MD as Consulting Physician (Cardiology)    Wt Readings from Last 3 Encounters:   08/18/21 213 lb (96.6 kg)   07/21/21 214 lb (97.1 kg)   10/21/20 205 lb (93 kg)     Patient-Reported Vitals 11/16/2020   Patient-Reported Weight 205 lb   Patient-Reported Systolic 369   Patient-Reported Diastolic 64   Patient-Reported Pulse 68      There is no height or weight on file to calculate BMI. Based upon direct observation of the patient, evaluation of cognition reveals recent and remote memory intact. Patient's complete Health Risk Assessment and screening values have been reviewed and are found in Flowsheets. The following problems were reviewed today and where indicated follow up appointments were made and/or referrals ordered. Positive Risk Factor Screenings with Interventions:            General Health and ACP:  General  In general, how would you say your health is?: Good  In the past 7 days, have you experienced any of the following?  New or Increased Pain, New or Increased Fatigue, Loneliness, Social Isolation, Stress or Anger?: None of These  Do you get the social and emotional support that you need?: Yes  Do you have a Living Will?: Yes  Advance Directives     Power of  Living Will ACP-Advance Directive ACP-Power of     Not on File Not on File Not on File Not on File      General Health Risk Interventions:  · none    Health Habits/Nutrition:  Health Habits/Nutrition  Do you exercise for at least 20 minutes 2-3 times per week?: Yes  Have you lost any weight without trying in the past 3 months?: No  Do you eat only one meal per day?: No  Have you seen the dentist within the past year?: Yes     Health Habits/Nutrition Interventions:  · none       Personalized Preventive Plan   Current Health Maintenance Status  Immunization History   Administered Date(s) Administered    COVID-19, Moderna, PF, 100mcg/0.5mL 01/06/2021, 02/02/2021    Influenza, High Dose (Fluzone 65 yrs and older) 09/20/2018    Influenza, High-dose, Quadv, 65 yrs +, IM (Fluzone) 09/09/2020    Influenza, Quadv, IM, PF (6 mo and older Fluzone, Flulaval, Fluarix, and 3 yrs and older Afluria) 10/11/2016    Influenza, Triv, inactivated, subunit, adjuvanted, IM (Fluad 65 yrs and older) 10/16/2019    Pneumococcal Polysaccharide ([id number]) 12/05/2015    Tdap (Boostrix, Adacel) 02/20/2019    Zoster Recombinant (Shingrix) 09/09/2020, 03/17/2021        Health Maintenance   Topic Date Due    Flu vaccine (1) 09/01/2021    Annual Wellness Visit (AWV)  10/22/2021    Lipid screen  08/18/2022    TSH testing  08/18/2022    Potassium monitoring  08/18/2022    Creatinine monitoring  08/18/2022    DTaP/Tdap/Td vaccine (3 - Td or Tdap) 02/20/2029    DEXA (modify frequency per FRAX score)  Completed    Shingles Vaccine  Completed    Pneumococcal 65+ years Vaccine  Completed    COVID-19 Vaccine  Completed    Hepatitis C screen  Completed    Hepatitis A vaccine  Aged Out    Hepatitis B vaccine  Aged Out    Hib vaccine  Aged Out    Meningococcal (ACWY) vaccine  Aged Out     Recommendations for [place] Due: see orders and patient instructions/AVS.  . Recommended screening schedule for the next 5-10 years is provided to the patient in written form: see Patient Jina Almonte was seen today for medicare awv. Diagnoses and all orders for this visit:    Routine general medical examination at a health care facility               Leonel Chan is a 67 y.o. female being evaluated by a Virtual Visit (video and audio) encounter to address concerns as mentioned above. A caregiver was present when appropriate. Due to this being a TeleHealth encounter (During [facility]-98 public health emergency), evaluation of the following organ systems was limited: Vitals/Constitutional/EENT/Resp/CV/GI//MS/Neuro/Skin/Heme-Lymph-Imm. Pursuant to the emergency declaration under the [facility], [address] [place] [address] waiver authority and the [employer] and [employer] Act, this Virtual Visit was conducted with patient's (and/or legal guardian's) consent, to reduce the patient's risk of exposure to COVID-19 and provide necessary medical care.   The patient (and/or legal guardian) has also been advised to contact this office for worsening conditions or problems, and seek emergency medical treatment and/or call 911 if deemed necessary. Patient identification was verified at the start of the visit: Yes    Services were provided through a video synchronous discussion virtually to substitute for in-person clinic visit. Patient and provider were located at their individual homes. --Juan Newman MD on 9/8/2021 at 11:12 AM    An electronic signature was used to authenticate this note.

## 2021-10-01 RX ORDER — ESOMEPRAZOLE MAGNESIUM 40 MG/1
CAPSULE, DELAYED RELEASE ORAL
Qty: 30 CAPSULE | Refills: 3 | Status: SHIPPED | OUTPATIENT
Start: 2021-10-01 | End: 2022-01-28

## 2021-10-01 RX ORDER — PROPRANOLOL HYDROCHLORIDE 120 MG/1
CAPSULE, EXTENDED RELEASE ORAL
Qty: 30 CAPSULE | Refills: 3 | Status: SHIPPED | OUTPATIENT
Start: 2021-10-01 | End: 2022-01-28

## 2021-10-01 RX ORDER — INDAPAMIDE 2.5 MG/1
TABLET, FILM COATED ORAL
Qty: 30 TABLET | Refills: 3 | Status: SHIPPED | OUTPATIENT
Start: 2021-10-01 | End: 2022-01-28

## 2021-10-01 RX ORDER — CANDESARTAN 32 MG/1
TABLET ORAL
Qty: 30 TABLET | Refills: 3 | Status: SHIPPED | OUTPATIENT
Start: 2021-10-01 | End: 2022-01-28

## 2021-10-01 RX ORDER — PRAVASTATIN SODIUM 10 MG
TABLET ORAL
Qty: 30 TABLET | Refills: 3 | Status: SHIPPED | OUTPATIENT
Start: 2021-10-01 | End: 2022-01-28

## 2021-10-05 ENCOUNTER — IMMUNIZATION (OUTPATIENT)
Dept: PRIMARY CARE CLINIC | Age: 76
End: 2021-10-05
Payer: MEDICARE

## 2021-10-05 DIAGNOSIS — Z23 FLU VACCINE NEED: Primary | ICD-10-CM

## 2021-10-05 PROCEDURE — G0008 ADMIN INFLUENZA VIRUS VAC: HCPCS | Performed by: NURSE PRACTITIONER

## 2021-10-05 PROCEDURE — 90694 VACC AIIV4 NO PRSRV 0.5ML IM: CPT | Performed by: NURSE PRACTITIONER

## 2021-11-29 RX ORDER — DICYCLOMINE HYDROCHLORIDE 10 MG/1
CAPSULE ORAL
Qty: 60 CAPSULE | Refills: 3 | Status: SHIPPED | OUTPATIENT
Start: 2021-11-29 | End: 2022-02-16

## 2021-11-29 RX ORDER — SERTRALINE HYDROCHLORIDE 100 MG/1
TABLET, FILM COATED ORAL
Qty: 30 TABLET | Refills: 5 | Status: SHIPPED | OUTPATIENT
Start: 2021-11-29 | End: 2022-05-27

## 2021-12-06 RX ORDER — MONTELUKAST SODIUM 4 MG/1
TABLET, CHEWABLE ORAL
Qty: 60 TABLET | Refills: 3 | Status: SHIPPED | OUTPATIENT
Start: 2021-12-06 | End: 2022-03-29

## 2022-01-28 RX ORDER — PRAVASTATIN SODIUM 10 MG
TABLET ORAL
Qty: 30 TABLET | Refills: 3 | Status: SHIPPED | OUTPATIENT
Start: 2022-01-28 | End: 2022-05-27

## 2022-01-28 RX ORDER — ESOMEPRAZOLE MAGNESIUM 40 MG/1
CAPSULE, DELAYED RELEASE ORAL
Qty: 30 CAPSULE | Refills: 3 | Status: SHIPPED | OUTPATIENT
Start: 2022-01-28 | End: 2022-05-27

## 2022-01-28 RX ORDER — CANDESARTAN 32 MG/1
TABLET ORAL
Qty: 30 TABLET | Refills: 3 | Status: SHIPPED | OUTPATIENT
Start: 2022-01-28 | End: 2022-05-27

## 2022-01-28 RX ORDER — PROPRANOLOL HYDROCHLORIDE 120 MG/1
CAPSULE, EXTENDED RELEASE ORAL
Qty: 30 CAPSULE | Refills: 3 | Status: SHIPPED | OUTPATIENT
Start: 2022-01-28 | End: 2022-05-27

## 2022-01-28 RX ORDER — INDAPAMIDE 2.5 MG/1
TABLET, FILM COATED ORAL
Qty: 30 TABLET | Refills: 3 | Status: SHIPPED | OUTPATIENT
Start: 2022-01-28 | End: 2022-05-27

## 2022-02-16 ENCOUNTER — OFFICE VISIT (OUTPATIENT)
Dept: CARDIOLOGY CLINIC | Age: 77
End: 2022-02-16
Payer: MEDICARE

## 2022-02-16 VITALS
HEART RATE: 62 BPM | WEIGHT: 216 LBS | BODY MASS INDEX: 38.27 KG/M2 | HEIGHT: 63 IN | DIASTOLIC BLOOD PRESSURE: 70 MMHG | SYSTOLIC BLOOD PRESSURE: 126 MMHG

## 2022-02-16 DIAGNOSIS — Z84.89 FAMILY HISTORY OF EARLY DEATH: ICD-10-CM

## 2022-02-16 DIAGNOSIS — E78.2 MIXED HYPERLIPIDEMIA: Primary | ICD-10-CM

## 2022-02-16 DIAGNOSIS — I10 ESSENTIAL HYPERTENSION: ICD-10-CM

## 2022-02-16 DIAGNOSIS — E78.00 HYPERCHOLESTEROLEMIA: Primary | ICD-10-CM

## 2022-02-16 PROCEDURE — 1123F ACP DISCUSS/DSCN MKR DOCD: CPT | Performed by: INTERNAL MEDICINE

## 2022-02-16 PROCEDURE — G8427 DOCREV CUR MEDS BY ELIG CLIN: HCPCS | Performed by: INTERNAL MEDICINE

## 2022-02-16 PROCEDURE — G8399 PT W/DXA RESULTS DOCUMENT: HCPCS | Performed by: INTERNAL MEDICINE

## 2022-02-16 PROCEDURE — 1090F PRES/ABSN URINE INCON ASSESS: CPT | Performed by: INTERNAL MEDICINE

## 2022-02-16 PROCEDURE — 4040F PNEUMOC VAC/ADMIN/RCVD: CPT | Performed by: INTERNAL MEDICINE

## 2022-02-16 PROCEDURE — 93000 ELECTROCARDIOGRAM COMPLETE: CPT | Performed by: INTERNAL MEDICINE

## 2022-02-16 PROCEDURE — 1036F TOBACCO NON-USER: CPT | Performed by: INTERNAL MEDICINE

## 2022-02-16 PROCEDURE — G8484 FLU IMMUNIZE NO ADMIN: HCPCS | Performed by: INTERNAL MEDICINE

## 2022-02-16 PROCEDURE — 99214 OFFICE O/P EST MOD 30 MIN: CPT | Performed by: INTERNAL MEDICINE

## 2022-02-16 PROCEDURE — G8417 CALC BMI ABV UP PARAM F/U: HCPCS | Performed by: INTERNAL MEDICINE

## 2022-02-16 NOTE — PROGRESS NOTES
HISTORY  71-year-old lady with a strong family history of coronary disease and a personal history of dyslipidemia, mild obstructive sleep apnea, and hypertension returns for routine follow-up. Her familial disease includes multiple siblings [somewhat sudden death] and her father. She has undergone angiographic assessment twice in the past, most recently in 2014 without evidence of atherosclerotic involvement. Her medical history has been dominated most recently by a right femur fracture that occurred 3 years ago. Her blood pressures been controlled as have her lipids with August 2021 values LDL 71, HDL 65, and triglycerides 156. Her sleep apnea was not of sufficient severity to justify CPAP. On return today she relates no cardiac symptoms. She has been vaccinated and boosted for COVID-19. PHYSICAL EXAM  On exam she carries 216 pounds in a 5 foot 3 inch frame. Pressure is 126/70 pulse is 62. Very pleasant endomorphic lady. EOMs full, sclerae and conjunctiva normal. PERRLA. Mask in place. Trachea midline with no neck masses. Assessment of internal jugular veins reveals no elevation of central venous pressure at 45 degrees. Carotid pulses normal without delay or bruit. Thyroid normal to palpation. Chest exam reveals normal respiratory effort, no abnormal breath sounds and normal expiratory phase. No skin lesions seen. PMI normal. S1, S2 normal without murmur or josh or click. Obese abdomen. Normal bowel sounds without palpable mass or bruit. No clubbing or acrocyanosis. No significant lower extremity edema or signs of venous insufficiency. General motor strength appears to be within normal limits. Normal range of motion with normal gait. Alert, oriented x 3, memory and cognition normal as reflected by history and conversation. EKG reveals normal sinus rhythm with possible left atrial enlargement. ASSESSMENT/PLAN:   1.   Coronary disease risk -she continues to harbor some concerns based on the family history referenced above [including a sister sudden death without prior symptoms]. Will obtain some calcium screening to hopefully provide a nice dose of reassurance. Continue risk factor modification with addressed to her lipids and blood pressure. 2.  Dyslipidemia -well controlled as seen above. Continue pravastatin  3. Hypertension -good control. Continue propranolol, indapamide, and Atacand.   4.  Pandemic response -appropriate/vaccinated/boosted

## 2022-02-22 ENCOUNTER — HOSPITAL ENCOUNTER (OUTPATIENT)
Dept: CT IMAGING | Age: 77
Discharge: HOME | End: 2022-02-22
Payer: MEDICARE

## 2022-02-22 DIAGNOSIS — R94.31 ABNORMAL EKG: ICD-10-CM

## 2022-02-22 DIAGNOSIS — Z84.89 FAMILY HISTORY OF EARLY DEATH: ICD-10-CM

## 2022-02-22 DIAGNOSIS — E78.00 HYPERCHOLESTEROLEMIA: ICD-10-CM

## 2022-02-22 DIAGNOSIS — R93.1 ELEVATED CORONARY ARTERY CALCIUM SCORE: Primary | ICD-10-CM

## 2022-02-22 DIAGNOSIS — I10 ESSENTIAL HYPERTENSION: ICD-10-CM

## 2022-02-22 PROCEDURE — 75571 CT HRT W/O DYE W/CA TEST: CPT

## 2022-03-10 ENCOUNTER — HOSPITAL ENCOUNTER (OUTPATIENT)
Dept: NUCLEAR MEDICINE | Age: 77
Discharge: HOME | End: 2022-03-12
Payer: MEDICARE

## 2022-03-10 DIAGNOSIS — R93.1 ELEVATED CORONARY ARTERY CALCIUM SCORE: ICD-10-CM

## 2022-03-10 DIAGNOSIS — E78.00 HYPERCHOLESTEROLEMIA: ICD-10-CM

## 2022-03-10 DIAGNOSIS — R94.31 ABNORMAL EKG: ICD-10-CM

## 2022-03-10 LAB
LV EF: 52 %
LVEF MODALITY: NORMAL

## 2022-03-10 PROCEDURE — A9502 TC99M TETROFOSMIN: HCPCS | Performed by: INTERNAL MEDICINE

## 2022-03-10 PROCEDURE — 3430000000 HC RX DIAGNOSTIC RADIOPHARMACEUTICAL: Performed by: INTERNAL MEDICINE

## 2022-03-10 PROCEDURE — 6360000002 HC RX W HCPCS: Performed by: INTERNAL MEDICINE

## 2022-03-10 PROCEDURE — 93017 CV STRESS TEST TRACING ONLY: CPT

## 2022-03-10 RX ADMIN — REGADENOSON 0.4 MG: 0.08 INJECTION, SOLUTION INTRAVENOUS at 08:29

## 2022-03-10 RX ADMIN — TETROFOSMIN 24 MILLICURIE: 1.38 INJECTION, POWDER, LYOPHILIZED, FOR SOLUTION INTRAVENOUS at 09:03

## 2022-03-10 RX ADMIN — TETROFOSMIN 8 MILLICURIE: 1.38 INJECTION, POWDER, LYOPHILIZED, FOR SOLUTION INTRAVENOUS at 09:04

## 2022-03-29 RX ORDER — MONTELUKAST SODIUM 4 MG/1
TABLET, CHEWABLE ORAL
Qty: 60 TABLET | Refills: 3 | Status: SHIPPED | OUTPATIENT
Start: 2022-03-29 | End: 2022-08-26

## 2022-04-29 DIAGNOSIS — K58.0 IRRITABLE BOWEL SYNDROME WITH DIARRHEA: Primary | ICD-10-CM

## 2022-04-29 RX ORDER — DIPHENOXYLATE HYDROCHLORIDE AND ATROPINE SULFATE 2.5; .025 MG/1; MG/1
1 TABLET ORAL 4 TIMES DAILY PRN
Qty: 30 TABLET | Refills: 2 | Status: SHIPPED | OUTPATIENT
Start: 2022-04-29 | End: 2022-05-12

## 2022-05-12 ENCOUNTER — OFFICE VISIT (OUTPATIENT)
Dept: PRIMARY CARE CLINIC | Age: 77
End: 2022-05-12
Payer: MEDICARE

## 2022-05-12 VITALS
OXYGEN SATURATION: 97 % | TEMPERATURE: 96.2 F | HEART RATE: 61 BPM | SYSTOLIC BLOOD PRESSURE: 132 MMHG | HEIGHT: 63 IN | BODY MASS INDEX: 38.66 KG/M2 | WEIGHT: 218.2 LBS | RESPIRATION RATE: 16 BRPM | DIASTOLIC BLOOD PRESSURE: 86 MMHG

## 2022-05-12 DIAGNOSIS — E78.00 HYPERCHOLESTEROLEMIA: ICD-10-CM

## 2022-05-12 DIAGNOSIS — I10 ESSENTIAL HYPERTENSION: ICD-10-CM

## 2022-05-12 DIAGNOSIS — E03.9 ACQUIRED HYPOTHYROIDISM: Primary | ICD-10-CM

## 2022-05-12 DIAGNOSIS — N89.8 VAGINAL ITCHING: ICD-10-CM

## 2022-05-12 DIAGNOSIS — E78.2 MIXED HYPERLIPIDEMIA: ICD-10-CM

## 2022-05-12 LAB
ALBUMIN SERPL-MCNC: 4.2 G/DL (ref 3.5–5.2)
ALP BLD-CCNC: 91 U/L (ref 35–104)
ALT SERPL-CCNC: 10 U/L (ref 5–33)
ANION GAP SERPL CALCULATED.3IONS-SCNC: 15 MMOL/L (ref 7–19)
AST SERPL-CCNC: 17 U/L (ref 5–32)
BASOPHILS ABSOLUTE: 0 K/UL (ref 0–0.2)
BASOPHILS RELATIVE PERCENT: 0.7 % (ref 0–1)
BILIRUB SERPL-MCNC: 0.3 MG/DL (ref 0.2–1.2)
BUN BLDV-MCNC: 29 MG/DL (ref 8–23)
CALCIUM SERPL-MCNC: 9.9 MG/DL (ref 8.8–10.2)
CHLORIDE BLD-SCNC: 101 MMOL/L (ref 98–111)
CHOLESTEROL, TOTAL: 196 MG/DL (ref 160–199)
CO2: 23 MMOL/L (ref 22–29)
CREAT SERPL-MCNC: 1.2 MG/DL (ref 0.5–0.9)
EOSINOPHILS ABSOLUTE: 0.2 K/UL (ref 0–0.6)
EOSINOPHILS RELATIVE PERCENT: 3 % (ref 0–5)
GFR AFRICAN AMERICAN: 53
GFR NON-AFRICAN AMERICAN: 44
GLUCOSE BLD-MCNC: 98 MG/DL (ref 74–109)
HCT VFR BLD CALC: 38.2 % (ref 37–47)
HDLC SERPL-MCNC: 67 MG/DL (ref 65–121)
HEMOGLOBIN: 11.8 G/DL (ref 12–16)
IMMATURE GRANULOCYTES #: 0 K/UL
LDL CHOLESTEROL CALCULATED: 85 MG/DL
LYMPHOCYTES ABSOLUTE: 0.9 K/UL (ref 1.1–4.5)
LYMPHOCYTES RELATIVE PERCENT: 15.4 % (ref 20–40)
MCH RBC QN AUTO: 28.7 PG (ref 27–31)
MCHC RBC AUTO-ENTMCNC: 30.9 G/DL (ref 33–37)
MCV RBC AUTO: 92.9 FL (ref 81–99)
MONOCYTES ABSOLUTE: 0.5 K/UL (ref 0–0.9)
MONOCYTES RELATIVE PERCENT: 9 % (ref 0–10)
NEUTROPHILS ABSOLUTE: 4 K/UL (ref 1.5–7.5)
NEUTROPHILS RELATIVE PERCENT: 71.2 % (ref 50–65)
PDW BLD-RTO: 13.9 % (ref 11.5–14.5)
PLATELET # BLD: 250 K/UL (ref 130–400)
PMV BLD AUTO: 11.1 FL (ref 9.4–12.3)
POTASSIUM SERPL-SCNC: 4.6 MMOL/L (ref 3.5–5)
RBC # BLD: 4.11 M/UL (ref 4.2–5.4)
SODIUM BLD-SCNC: 139 MMOL/L (ref 136–145)
T4 FREE: 1.39 NG/DL (ref 0.93–1.7)
TOTAL PROTEIN: 7 G/DL (ref 6.6–8.7)
TRIGL SERPL-MCNC: 220 MG/DL (ref 0–149)
TSH SERPL DL<=0.05 MIU/L-ACNC: 3.08 UIU/ML (ref 0.27–4.2)
WBC # BLD: 5.7 K/UL (ref 4.8–10.8)

## 2022-05-12 PROCEDURE — 4040F PNEUMOC VAC/ADMIN/RCVD: CPT | Performed by: FAMILY MEDICINE

## 2022-05-12 PROCEDURE — 1036F TOBACCO NON-USER: CPT | Performed by: FAMILY MEDICINE

## 2022-05-12 PROCEDURE — G8427 DOCREV CUR MEDS BY ELIG CLIN: HCPCS | Performed by: FAMILY MEDICINE

## 2022-05-12 PROCEDURE — G8417 CALC BMI ABV UP PARAM F/U: HCPCS | Performed by: FAMILY MEDICINE

## 2022-05-12 PROCEDURE — 99214 OFFICE O/P EST MOD 30 MIN: CPT | Performed by: FAMILY MEDICINE

## 2022-05-12 PROCEDURE — G8399 PT W/DXA RESULTS DOCUMENT: HCPCS | Performed by: FAMILY MEDICINE

## 2022-05-12 PROCEDURE — 1123F ACP DISCUSS/DSCN MKR DOCD: CPT | Performed by: FAMILY MEDICINE

## 2022-05-12 PROCEDURE — 1090F PRES/ABSN URINE INCON ASSESS: CPT | Performed by: FAMILY MEDICINE

## 2022-05-12 RX ORDER — NYSTATIN 100000 U/G
OINTMENT TOPICAL
Qty: 60 G | Refills: 0 | Status: SHIPPED | OUTPATIENT
Start: 2022-05-12

## 2022-05-12 RX ORDER — CANDESARTAN CILEXETIL AND HYDROCHLOROTHIAZIDE 32; 12.5 MG/1; MG/1
TABLET ORAL
COMMUNITY
Start: 2021-08-30 | End: 2022-05-12

## 2022-05-12 ASSESSMENT — ENCOUNTER SYMPTOMS
VOMITING: 0
BACK PAIN: 0
WHEEZING: 0
ABDOMINAL PAIN: 0
COLOR CHANGE: 0
EYE DISCHARGE: 0
COUGH: 0
NAUSEA: 0
DIARRHEA: 0

## 2022-05-12 NOTE — PROGRESS NOTES
SUBJECTIVE:    Patient ID: Muna Zhang is a 67 y.o. female. HPI:   Patient is seen today for problems with what she feels like is a white area on her vaginal area. She states that she has not had any discharge or drainage from this area. She states that it is burning some and slightly itching. She states that she thinks that she has this because she does have some urinary incontinence occasionally. She has not noticed any vaginal discharge. She denies any fevers or chills. She has not tried using anything on it to this point. She does have a history of hyperlipidemia and hypothyroidism. She is fasting today for blood work. She states that she is taking her Synthroid regularly. She is currently on 88 mcg. She is also on pravastatin 10 mg and seems to be tolerating this well. Blood pressure was well controlled in office today.     Past Medical History:   Diagnosis Date    Carpal tunnel syndrome, right     HIGH CHOLESTEROL     Hypertension     Hypothyroidism     Osteoporosis       Current Outpatient Medications on File Prior to Visit   Medication Sig Dispense Refill    colestipol (COLESTID) 1 g tablet TAKE ONE TABLET BY MOUTH TWICE A DAY 60 tablet 3    indapamide (LOZOL) 2.5 MG tablet TAKE ONE TABLET BY MOUTH EVERY DAY 30 tablet 3    propranolol (INDERAL LA) 120 MG extended release capsule TAKE ONE CAPSULE BY MOUTH EVERY DAY 30 capsule 3    candesartan (ATACAND) 32 MG tablet TAKE ONE TABLET BY MOUTH EVERY DAY 30 tablet 3    esomeprazole (NEXIUM) 40 MG delayed release capsule TAKE ONE CAPSULE BY MOUTH EVERY MORNING BEFORE BREAKFAST 30 capsule 3    pravastatin (PRAVACHOL) 10 MG tablet TAKE ONE TABLET BY MOUTH EVERY DAY 30 tablet 3    sertraline (ZOLOFT) 100 MG tablet TAKE ONE TABLET BY MOUTH EVERY DAY 30 tablet 5    Calcium-Vitamin D-Vitamin K (VIACTIV CALCIUM PLUS D) 650-12.5-40 MG-MCG-MCG CHEW Take by mouth Viactiv      TYMLOS 3120 MCG/1.56ML SOPN daily       levothyroxine (SYNTHROID) 88 MCG tablet Take 88 mcg by mouth Daily Patient takes 88 mcg every day but Sunday she takes 1.5 pills.  Magic Mouthwash (MIRACLE MOUTHWASH) Swish and spit 5 mLs 4 times daily as needed for Irritation 240 mL 1    aspirin 81 MG tablet Take 81 mg by mouth daily       No current facility-administered medications on file prior to visit. Allergies   Allergen Reactions    Codeine Shortness Of Breath     Unknown       Review of Systems   Constitutional: Negative for activity change, appetite change and fever. HENT: Negative for congestion and nosebleeds. Eyes: Negative for discharge. Respiratory: Negative for cough and wheezing. Cardiovascular: Negative for chest pain and leg swelling. Gastrointestinal: Negative for abdominal pain, diarrhea, nausea and vomiting. Genitourinary: Negative for difficulty urinating, frequency and urgency. Musculoskeletal: Negative for back pain and gait problem. Skin: Negative for color change and rash. Neurological: Negative for dizziness and headaches. Hematological: Does not bruise/bleed easily. Psychiatric/Behavioral: Negative for sleep disturbance and suicidal ideas. OBJECTIVE:    Physical Exam  Constitutional:       Appearance: She is well-developed. HENT:      Head: Normocephalic and atraumatic. Neck:      Thyroid: No thyroid mass or thyromegaly. Vascular: No carotid bruit. Cardiovascular:      Rate and Rhythm: Normal rate and regular rhythm. Pulses: Normal pulses. Heart sounds: Normal heart sounds. No murmur heard. No friction rub. No gallop. Pulmonary:      Effort: Pulmonary effort is normal. No respiratory distress. Breath sounds: Normal breath sounds. Abdominal:      General: Abdomen is flat. Bowel sounds are normal.      Palpations: Abdomen is soft. Tenderness: There is no abdominal tenderness.    Genitourinary:     Comments: No thickened areas noted on exam.  There were no abnormalities that I can tell on exam other than she does have some postmenopausal vaginal atrophy. There was no areas of lichen identified on her exam.  There were no other lesions seen. Musculoskeletal:      Cervical back: Normal range of motion and neck supple. Lymphadenopathy:      Cervical: No cervical adenopathy. Skin:     General: Skin is warm and dry. Findings: No rash. Neurological:      General: No focal deficit present. Mental Status: She is alert and oriented to person, place, and time. Mental status is at baseline. Psychiatric:         Mood and Affect: Mood normal.         Behavior: Behavior normal.         Thought Content: Thought content normal.         Judgment: Judgment normal.        /86   Pulse 61   Temp 96.2 °F (35.7 °C) (Temporal)   Resp 16   Ht 5' 3\" (1.6 m)   Wt 218 lb 3.2 oz (99 kg)   SpO2 97%   BMI 38.65 kg/m²      ASSESSMENT/PLAN:    1. Acquired hypothyroidism  -     Lipid Panel  -     T4, Free  -     TSH  -     Comprehensive Metabolic Panel  -     CBC with Auto Differential  2. Essential hypertension  -     Lipid Panel  -     T4, Free  -     TSH  -     Comprehensive Metabolic Panel  -     CBC with Auto Differential  3. Mixed hyperlipidemia  -     Lipid Panel  -     T4, Free  -     TSH  -     Comprehensive Metabolic Panel  -     CBC with Auto Differential  4. Hypercholesterolemia  5. Vaginal itching       I am going to send over some nystatin ointment as well as a steroid ointment for her to mix together to put on the vaginal area that is bothering her. If this is not getting better she is to let us know we will put a referral in for GYN. Fasting labs are drawn today. Thyroid labs are drawn today. Continue current dose of thyroid medication at this time. Continue current blood pressure medications as blood pressure is currently staying well controlled. Follow-up with us in August for her yearly physical unless needed sooner.     PDMP Monitoring:    Last PDMP Venson Bonus as Reviewed [facility]):  Review User Review Instant Review Result            Urine Drug Screenings (1 yr)    No resulted procedures found. Medication Contract and Consent for Opioid Use Documents Filed     Patient Documents     Type of Document Status Date Received Received By Description    Medication Contract [Status Missing]  CUEVAS, [address] [facility] 7.5mg                  EMR Dragon/transcription disclaimer:  Much of this encounter note is electronic transcription/translation of spoken language toprinted texts. The electronic translation of spoken language may be erroneous, or at times, nonsensical words or phrases may be inadvertently transcribed.   Although I have reviewed the note for such errors, some may stillexist.

## 2022-05-19 ENCOUNTER — NURSE ONLY (OUTPATIENT)
Dept: PRIMARY CARE CLINIC | Age: 77
End: 2022-05-19

## 2022-05-19 DIAGNOSIS — E78.00 HYPERCHOLESTEROLEMIA: ICD-10-CM

## 2022-05-19 DIAGNOSIS — E78.2 MIXED HYPERLIPIDEMIA: ICD-10-CM

## 2022-05-19 DIAGNOSIS — D64.9 ANEMIA, UNSPECIFIED TYPE: ICD-10-CM

## 2022-05-19 DIAGNOSIS — E03.9 ACQUIRED HYPOTHYROIDISM: ICD-10-CM

## 2022-05-19 DIAGNOSIS — I10 ESSENTIAL HYPERTENSION: Primary | ICD-10-CM

## 2022-05-19 LAB
ALBUMIN SERPL-MCNC: 4.3 G/DL (ref 3.5–5.2)
ALP BLD-CCNC: 90 U/L (ref 35–104)
ALT SERPL-CCNC: 9 U/L (ref 5–33)
ANION GAP SERPL CALCULATED.3IONS-SCNC: 14 MMOL/L (ref 7–19)
AST SERPL-CCNC: 17 U/L (ref 5–32)
BASOPHILS ABSOLUTE: 0.1 K/UL (ref 0–0.2)
BASOPHILS RELATIVE PERCENT: 0.8 % (ref 0–1)
BILIRUB SERPL-MCNC: 0.3 MG/DL (ref 0.2–1.2)
BUN BLDV-MCNC: 35 MG/DL (ref 8–23)
CALCIUM SERPL-MCNC: 9.8 MG/DL (ref 8.8–10.2)
CHLORIDE BLD-SCNC: 103 MMOL/L (ref 98–111)
CO2: 24 MMOL/L (ref 22–29)
CREAT SERPL-MCNC: 1.2 MG/DL (ref 0.5–0.9)
EOSINOPHILS ABSOLUTE: 0.2 K/UL (ref 0–0.6)
EOSINOPHILS RELATIVE PERCENT: 2.8 % (ref 0–5)
FERRITIN: 17.3 NG/ML (ref 13–150)
GFR AFRICAN AMERICAN: 53
GFR NON-AFRICAN AMERICAN: 44
GLUCOSE BLD-MCNC: 93 MG/DL (ref 74–109)
HCT VFR BLD CALC: 39.3 % (ref 37–47)
HEMOGLOBIN: 11.8 G/DL (ref 12–16)
IMMATURE GRANULOCYTES #: 0 K/UL
IRON SATURATION: 12 % (ref 14–50)
IRON: 48 UG/DL (ref 37–145)
LYMPHOCYTES ABSOLUTE: 1.1 K/UL (ref 1.1–4.5)
LYMPHOCYTES RELATIVE PERCENT: 17.9 % (ref 20–40)
MCH RBC QN AUTO: 28.7 PG (ref 27–31)
MCHC RBC AUTO-ENTMCNC: 30 G/DL (ref 33–37)
MCV RBC AUTO: 95.6 FL (ref 81–99)
MONOCYTES ABSOLUTE: 0.6 K/UL (ref 0–0.9)
MONOCYTES RELATIVE PERCENT: 9.1 % (ref 0–10)
NEUTROPHILS ABSOLUTE: 4.2 K/UL (ref 1.5–7.5)
NEUTROPHILS RELATIVE PERCENT: 68.7 % (ref 50–65)
PDW BLD-RTO: 14.3 % (ref 11.5–14.5)
PLATELET # BLD: 264 K/UL (ref 130–400)
PMV BLD AUTO: 11.2 FL (ref 9.4–12.3)
POTASSIUM SERPL-SCNC: 4.7 MMOL/L (ref 3.5–5)
RBC # BLD: 4.11 M/UL (ref 4.2–5.4)
SODIUM BLD-SCNC: 141 MMOL/L (ref 136–145)
TOTAL IRON BINDING CAPACITY: 387 UG/DL (ref 250–400)
TOTAL PROTEIN: 6.9 G/DL (ref 6.6–8.7)
VITAMIN B-12: 333 PG/ML (ref 211–946)
WBC # BLD: 6.1 K/UL (ref 4.8–10.8)

## 2022-05-23 ENCOUNTER — NURSE ONLY (OUTPATIENT)
Dept: PRIMARY CARE CLINIC | Age: 77
End: 2022-05-23
Payer: MEDICARE

## 2022-05-23 DIAGNOSIS — D64.9 ANEMIA, UNSPECIFIED TYPE: Primary | ICD-10-CM

## 2022-05-23 LAB
HEMOCCULT STL QL: NEGATIVE

## 2022-05-23 PROCEDURE — 82270 OCCULT BLOOD FECES: CPT | Performed by: FAMILY MEDICINE

## 2022-05-23 NOTE — PROGRESS NOTES
Patient was seen today for a nurse's visit to have POCT occult blood stool test. Results showed negative on all 3 cards. Results forwarded to PCP.

## 2022-05-27 RX ORDER — INDAPAMIDE 2.5 MG/1
TABLET, FILM COATED ORAL
Qty: 30 TABLET | Refills: 3 | Status: SHIPPED | OUTPATIENT
Start: 2022-05-27 | End: 2022-09-27

## 2022-05-27 RX ORDER — PRAVASTATIN SODIUM 10 MG
TABLET ORAL
Qty: 30 TABLET | Refills: 3 | Status: SHIPPED | OUTPATIENT
Start: 2022-05-27 | End: 2022-09-27

## 2022-05-27 RX ORDER — CANDESARTAN 32 MG/1
TABLET ORAL
Qty: 30 TABLET | Refills: 3 | Status: SHIPPED | OUTPATIENT
Start: 2022-05-27 | End: 2022-09-27

## 2022-05-27 RX ORDER — PROPRANOLOL HYDROCHLORIDE 120 MG/1
CAPSULE, EXTENDED RELEASE ORAL
Qty: 30 CAPSULE | Refills: 3 | Status: SHIPPED | OUTPATIENT
Start: 2022-05-27 | End: 2022-09-27

## 2022-05-27 RX ORDER — SERTRALINE HYDROCHLORIDE 100 MG/1
TABLET, FILM COATED ORAL
Qty: 30 TABLET | Refills: 5 | Status: SHIPPED | OUTPATIENT
Start: 2022-05-27

## 2022-05-27 RX ORDER — ESOMEPRAZOLE MAGNESIUM 40 MG/1
CAPSULE, DELAYED RELEASE ORAL
Qty: 30 CAPSULE | Refills: 3 | Status: SHIPPED | OUTPATIENT
Start: 2022-05-27 | End: 2022-09-27

## 2022-06-17 ENCOUNTER — HOSPITAL ENCOUNTER (OUTPATIENT)
Dept: MAMMOGRAPHY | Facility: HOSPITAL | Age: 77
Discharge: HOME | End: 2022-06-17
Admitting: SPECIALIST

## 2022-06-17 DIAGNOSIS — Z12.31 ENCOUNTER FOR SCREENING MAMMOGRAM FOR MALIGNANT NEOPLASM OF BREAST: ICD-10-CM

## 2022-06-17 PROCEDURE — 77067 SCR MAMMO BI INCL CAD: CPT

## 2022-06-17 PROCEDURE — 77063 BREAST TOMOSYNTHESIS BI: CPT

## 2022-06-20 ENCOUNTER — HOSPITAL ENCOUNTER (OUTPATIENT)
Dept: MAMMOGRAPHY | Facility: HOSPITAL | Age: 77
Discharge: HOME | End: 2022-06-20
Admitting: SPECIALIST

## 2022-06-20 ENCOUNTER — PREP FOR SURGERY (OUTPATIENT)
Dept: OTHER | Facility: HOSPITAL | Age: 77
End: 2022-06-20

## 2022-06-20 ENCOUNTER — OFFICE VISIT (OUTPATIENT)
Dept: SURGERY | Facility: CLINIC | Age: 77
End: 2022-06-20

## 2022-06-20 VITALS
DIASTOLIC BLOOD PRESSURE: 62 MMHG | HEIGHT: 64 IN | SYSTOLIC BLOOD PRESSURE: 118 MMHG | WEIGHT: 215 LBS | BODY MASS INDEX: 36.7 KG/M2

## 2022-06-20 DIAGNOSIS — R92.1 CALCIFICATION OF LEFT BREAST ON MAMMOGRAPHY: ICD-10-CM

## 2022-06-20 DIAGNOSIS — R92.1 BREAST CALCIFICATIONS: Primary | ICD-10-CM

## 2022-06-20 DIAGNOSIS — R92.1 CALCIFICATION OF LEFT BREAST ON MAMMOGRAPHY: Primary | ICD-10-CM

## 2022-06-20 DIAGNOSIS — R92.1 BREAST CALCIFICATIONS: ICD-10-CM

## 2022-06-20 PROCEDURE — 99213 OFFICE O/P EST LOW 20 MIN: CPT | Performed by: SPECIALIST

## 2022-06-20 PROCEDURE — G0279 TOMOSYNTHESIS, MAMMO: HCPCS

## 2022-06-20 PROCEDURE — 77065 DX MAMMO INCL CAD UNI: CPT

## 2022-06-20 RX ORDER — MONTELUKAST SODIUM 4 MG/1
TABLET, CHEWABLE ORAL
COMMUNITY
Start: 2022-06-01

## 2022-06-20 NOTE — PROGRESS NOTES
"Zahra Paredes MD FACS Breast follow up note    Referring Provider: No ref. provider found      Chief complaint   Chief Complaint   Patient presents with   • Follow-up        Subjective .     History of present illness:  Vanessa Kamara  is a 76 y.o. female who presents for breast follow up.  She denies any breast, nipple, or skin changes.      History    The following portions of the patient's history were reviewed and updated as appropriate: allergies, current medications, past family history, past medical history, past social history, past surgical history, and problem list.    Objective     Vital Signs   /62 (BP Location: Right arm, Patient Position: Sitting, Cuff Size: Adult)   Ht 162.6 cm (64\")   Wt 97.5 kg (215 lb)   LMP  (LMP Unknown)   BMI 36.90 kg/m²      Physical Exam:    General The patient is well-developed, well-nourished, and in no acute distress.  Breast  Nipples everted without expressible discharge.  No erythema, edema, induration, or dimpling.  No palpable masses.  No axillary adenopathy.      Imaging:  B Screening mammogram 06-17-22:  Indeterminate microcalcifications on left    Class 2 Severe Obesity (BMI >=35 and <=39.9). Obesity-related health conditions include the following: none. Obesity is newly identified. BMI is is above average; BMI management plan is completed. We discussed portion control and increasing exercise.    Assessment & Plan       Diagnoses and all orders for this visit:    1. Breast calcifications (Primary)  -     Mammo Diagnostic Digital Tomosynthesis Left With CAD; Future           An extensive review of patient intake forms, referring physician documents, laboratories, and imaging was performed in the medical decision making and surgical planning of this patient.     The patient will be scheduled for left diagnostic mammogram with magnifications views.    ADDENDUM:  She underwent the left diagnostic mammogram and biopsy was suggested. She will be scheduled for " left stereotactic biopsy.  She will return to discuss the pathology and plan any further required surgical treatment.    Zahra Paredes MD  06/20/22  13:30 CDT

## 2022-06-23 ENCOUNTER — TRANSCRIBE ORDERS (OUTPATIENT)
Dept: ADMINISTRATIVE | Facility: HOSPITAL | Age: 77
End: 2022-06-23

## 2022-06-23 DIAGNOSIS — R92.1 CALCIFICATION OF LEFT BREAST ON MAMMOGRAPHY: Primary | ICD-10-CM

## 2022-06-27 RX ORDER — TRIAMCINOLONE ACETONIDE 1 MG/G
CREAM TOPICAL
Qty: 60 G | Refills: 0 | Status: SHIPPED | OUTPATIENT
Start: 2022-06-27

## 2022-06-30 ENCOUNTER — HOSPITAL ENCOUNTER (OUTPATIENT)
Dept: MAMMOGRAPHY | Facility: HOSPITAL | Age: 77
Discharge: HOME | End: 2022-06-30

## 2022-06-30 DIAGNOSIS — D64.9 ANEMIA, UNSPECIFIED TYPE: Primary | ICD-10-CM

## 2022-06-30 DIAGNOSIS — R92.1 CALCIFICATION OF LEFT BREAST ON MAMMOGRAPHY: ICD-10-CM

## 2022-06-30 PROCEDURE — A4648 IMPLANTABLE TISSUE MARKER: HCPCS

## 2022-06-30 PROCEDURE — 88305 TISSUE EXAM BY PATHOLOGIST: CPT | Performed by: SPECIALIST

## 2022-06-30 RX ORDER — LIDOCAINE HYDROCHLORIDE AND EPINEPHRINE 10; 10 MG/ML; UG/ML
10 INJECTION, SOLUTION INFILTRATION; PERINEURAL ONCE
Status: DISPENSED | OUTPATIENT
Start: 2022-06-30

## 2022-06-30 RX ORDER — LIDOCAINE HYDROCHLORIDE 10 MG/ML
10 INJECTION, SOLUTION INFILTRATION; PERINEURAL ONCE
Status: DISPENSED | OUTPATIENT
Start: 2022-06-30

## 2022-07-01 LAB
CYTO UR: NORMAL
LAB AP CASE REPORT: NORMAL
LAB AP CLINICAL INFORMATION: NORMAL
Lab: NORMAL
PATH REPORT.FINAL DX SPEC: NORMAL
PATH REPORT.GROSS SPEC: NORMAL

## 2022-07-06 DIAGNOSIS — D64.9 ANEMIA, UNSPECIFIED TYPE: ICD-10-CM

## 2022-07-06 LAB
BASOPHILS ABSOLUTE: 0.1 K/UL (ref 0–0.2)
BASOPHILS RELATIVE PERCENT: 0.7 % (ref 0–1)
EOSINOPHILS ABSOLUTE: 0.2 K/UL (ref 0–0.6)
EOSINOPHILS RELATIVE PERCENT: 3.3 % (ref 0–5)
HCT VFR BLD CALC: 37 % (ref 37–47)
HEMOGLOBIN: 11.4 G/DL (ref 12–16)
IMMATURE GRANULOCYTES #: 0 K/UL
LYMPHOCYTES ABSOLUTE: 1.1 K/UL (ref 1.1–4.5)
LYMPHOCYTES RELATIVE PERCENT: 16.3 % (ref 20–40)
MCH RBC QN AUTO: 28.7 PG (ref 27–31)
MCHC RBC AUTO-ENTMCNC: 30.8 G/DL (ref 33–37)
MCV RBC AUTO: 93.2 FL (ref 81–99)
MONOCYTES ABSOLUTE: 0.6 K/UL (ref 0–0.9)
MONOCYTES RELATIVE PERCENT: 9 % (ref 0–10)
NEUTROPHILS ABSOLUTE: 4.8 K/UL (ref 1.5–7.5)
NEUTROPHILS RELATIVE PERCENT: 70.3 % (ref 50–65)
PDW BLD-RTO: 13.8 % (ref 11.5–14.5)
PLATELET # BLD: 269 K/UL (ref 130–400)
PMV BLD AUTO: 10.9 FL (ref 9.4–12.3)
RBC # BLD: 3.97 M/UL (ref 4.2–5.4)
WBC # BLD: 6.9 K/UL (ref 4.8–10.8)

## 2022-07-14 DIAGNOSIS — N60.22 FIBROADENOSIS OF BREAST, LEFT: Primary | ICD-10-CM

## 2022-07-14 DIAGNOSIS — Z12.31 SCREENING MAMMOGRAM FOR HIGH-RISK PATIENT: ICD-10-CM

## 2022-07-14 DIAGNOSIS — R92.2 DENSE BREAST TISSUE ON MAMMOGRAM: ICD-10-CM

## 2022-08-26 RX ORDER — MONTELUKAST SODIUM 4 MG/1
TABLET, CHEWABLE ORAL
Qty: 60 TABLET | Refills: 3 | Status: SHIPPED | OUTPATIENT
Start: 2022-08-26

## 2022-09-08 ENCOUNTER — OFFICE VISIT (OUTPATIENT)
Dept: PRIMARY CARE CLINIC | Age: 77
End: 2022-09-08
Payer: MEDICARE

## 2022-09-08 VITALS
SYSTOLIC BLOOD PRESSURE: 120 MMHG | OXYGEN SATURATION: 97 % | HEIGHT: 63 IN | WEIGHT: 218 LBS | DIASTOLIC BLOOD PRESSURE: 80 MMHG | BODY MASS INDEX: 38.62 KG/M2 | HEART RATE: 61 BPM | TEMPERATURE: 97.1 F

## 2022-09-08 DIAGNOSIS — E78.2 MIXED HYPERLIPIDEMIA: ICD-10-CM

## 2022-09-08 DIAGNOSIS — E03.9 ACQUIRED HYPOTHYROIDISM: ICD-10-CM

## 2022-09-08 DIAGNOSIS — I10 ESSENTIAL HYPERTENSION: Primary | ICD-10-CM

## 2022-09-08 DIAGNOSIS — N28.9 DECREASED RENAL FUNCTION: ICD-10-CM

## 2022-09-08 DIAGNOSIS — E78.00 HYPERCHOLESTEROLEMIA: ICD-10-CM

## 2022-09-08 LAB
ALBUMIN SERPL-MCNC: 4.4 G/DL (ref 3.5–5.2)
ALP BLD-CCNC: 99 U/L (ref 35–104)
ALT SERPL-CCNC: 12 U/L (ref 5–33)
ANION GAP SERPL CALCULATED.3IONS-SCNC: 14 MMOL/L (ref 7–19)
AST SERPL-CCNC: 18 U/L (ref 5–32)
BASOPHILS ABSOLUTE: 0.1 K/UL (ref 0–0.2)
BASOPHILS RELATIVE PERCENT: 0.9 % (ref 0–1)
BILIRUB SERPL-MCNC: 0.4 MG/DL (ref 0.2–1.2)
BUN BLDV-MCNC: 27 MG/DL (ref 8–23)
CALCIUM SERPL-MCNC: 9.6 MG/DL (ref 8.8–10.2)
CHLORIDE BLD-SCNC: 100 MMOL/L (ref 98–111)
CHOLESTEROL, TOTAL: 193 MG/DL (ref 160–199)
CO2: 22 MMOL/L (ref 22–29)
CREAT SERPL-MCNC: 1.1 MG/DL (ref 0.5–0.9)
EOSINOPHILS ABSOLUTE: 0.2 K/UL (ref 0–0.6)
EOSINOPHILS RELATIVE PERCENT: 2.2 % (ref 0–5)
GFR AFRICAN AMERICAN: 58
GFR NON-AFRICAN AMERICAN: 48
GLUCOSE BLD-MCNC: 105 MG/DL (ref 74–109)
HCT VFR BLD CALC: 38.6 % (ref 37–47)
HDLC SERPL-MCNC: 63 MG/DL (ref 65–121)
HEMOGLOBIN: 12.1 G/DL (ref 12–16)
IMMATURE GRANULOCYTES #: 0.1 K/UL
LDL CHOLESTEROL CALCULATED: 90 MG/DL
LYMPHOCYTES ABSOLUTE: 1.1 K/UL (ref 1.1–4.5)
LYMPHOCYTES RELATIVE PERCENT: 16.6 % (ref 20–40)
MCH RBC QN AUTO: 28.9 PG (ref 27–31)
MCHC RBC AUTO-ENTMCNC: 31.3 G/DL (ref 33–37)
MCV RBC AUTO: 92.1 FL (ref 81–99)
MONOCYTES ABSOLUTE: 0.6 K/UL (ref 0–0.9)
MONOCYTES RELATIVE PERCENT: 8.8 % (ref 0–10)
NEUTROPHILS ABSOLUTE: 4.7 K/UL (ref 1.5–7.5)
NEUTROPHILS RELATIVE PERCENT: 70.8 % (ref 50–65)
PDW BLD-RTO: 14.1 % (ref 11.5–14.5)
PLATELET # BLD: 264 K/UL (ref 130–400)
PMV BLD AUTO: 10.8 FL (ref 9.4–12.3)
POTASSIUM SERPL-SCNC: 4.6 MMOL/L (ref 3.5–5)
RBC # BLD: 4.19 M/UL (ref 4.2–5.4)
SODIUM BLD-SCNC: 136 MMOL/L (ref 136–145)
TOTAL PROTEIN: 7 G/DL (ref 6.6–8.7)
TRIGL SERPL-MCNC: 202 MG/DL (ref 0–149)
WBC # BLD: 6.7 K/UL (ref 4.8–10.8)

## 2022-09-08 PROCEDURE — 1090F PRES/ABSN URINE INCON ASSESS: CPT | Performed by: FAMILY MEDICINE

## 2022-09-08 PROCEDURE — 1123F ACP DISCUSS/DSCN MKR DOCD: CPT | Performed by: FAMILY MEDICINE

## 2022-09-08 PROCEDURE — 99213 OFFICE O/P EST LOW 20 MIN: CPT | Performed by: FAMILY MEDICINE

## 2022-09-08 PROCEDURE — 1036F TOBACCO NON-USER: CPT | Performed by: FAMILY MEDICINE

## 2022-09-08 PROCEDURE — G8399 PT W/DXA RESULTS DOCUMENT: HCPCS | Performed by: FAMILY MEDICINE

## 2022-09-08 PROCEDURE — G8417 CALC BMI ABV UP PARAM F/U: HCPCS | Performed by: FAMILY MEDICINE

## 2022-09-08 PROCEDURE — G8427 DOCREV CUR MEDS BY ELIG CLIN: HCPCS | Performed by: FAMILY MEDICINE

## 2022-09-08 SDOH — ECONOMIC STABILITY: FOOD INSECURITY: WITHIN THE PAST 12 MONTHS, YOU WORRIED THAT YOUR FOOD WOULD RUN OUT BEFORE YOU GOT MONEY TO BUY MORE.: NEVER TRUE

## 2022-09-08 SDOH — ECONOMIC STABILITY: FOOD INSECURITY: WITHIN THE PAST 12 MONTHS, THE FOOD YOU BOUGHT JUST DIDN'T LAST AND YOU DIDN'T HAVE MONEY TO GET MORE.: NEVER TRUE

## 2022-09-08 ASSESSMENT — ENCOUNTER SYMPTOMS
ABDOMINAL PAIN: 0
BACK PAIN: 0
VOMITING: 0
COUGH: 0
EYE DISCHARGE: 0
DIARRHEA: 0
NAUSEA: 0
WHEEZING: 0
COLOR CHANGE: 0

## 2022-09-08 ASSESSMENT — SOCIAL DETERMINANTS OF HEALTH (SDOH): HOW HARD IS IT FOR YOU TO PAY FOR THE VERY BASICS LIKE FOOD, HOUSING, MEDICAL CARE, AND HEATING?: NOT HARD AT ALL

## 2022-09-08 NOTE — PROGRESS NOTES
SUBJECTIVE:    Patient ID: Todd Wood is a 67 y.o. female. HPI:   Patient is seen today for follow-up visit. She has a history of hypertension. Blood pressures currently well controlled. She is currently taking candesartan, indapamide, propranolol. She is tolerating these well. She is checking blood pressure at home occasionally and it stays well controlled. She denies any chest pain or palpitations. She states that she is currently walking on a treadmill and feels like she is try to get her strength back up with doing this. She had a femur fracture couple of years ago and states that is taken wildly her strength back up but she does feel like her stamina is getting better especially since she has started walking on the treadmill. She is currently followed by someone at [facility] for her osteoporosis. They have recently stopped her osteoporosis medication because she completed her therapy with the 1 she was on and they talked about putting her on Reclast however they state opted not to do that because of her decreased kidney function. She would like to have her levels checked today and she is fasting. She states that she was scheduled today originally for Medicare annual wellness but her last was done on September 8 of last year so she technically is not due for it yet.   Past Medical History:   Diagnosis Date    Carpal tunnel syndrome, right     HIGH CHOLESTEROL     Hypertension     Hypothyroidism     Osteoporosis       Current Outpatient Medications on File Prior to Visit   Medication Sig Dispense Refill    colestipol (COLESTID) 1 g tablet TAKE ONE TABLET BY MOUTH TWICE A DAY 60 tablet 3    triamcinolone (KENALOG) 0.1 % cream APPLY TOPICALLY TWO TIMES A DAY FOR 7 DAYS 60 g 0    sertraline (ZOLOFT) 100 MG tablet TAKE ONE TABLET BY MOUTH EVERY DAY 30 tablet 5    propranolol (INDERAL LA) 120 MG extended release capsule TAKE ONE CAPSULE BY MOUTH EVERY DAY 30 capsule 3    pravastatin (PRAVACHOL) 10 MG tablet TAKE ONE TABLET BY MOUTH EVERY DAY 30 tablet 3    candesartan (ATACAND) 32 MG tablet TAKE ONE TABLET BY MOUTH EVERY DAY 30 tablet 3    esomeprazole (NEXIUM) 40 MG delayed release capsule TAKE ONE CAPSULE BY MOUTH EVERY MORNING BEFORE BREAKFAST 30 capsule 3    indapamide (LOZOL) 2.5 MG tablet TAKE ONE TABLET BY MOUTH EVERY DAY 30 tablet 3    nystatin (MYCOSTATIN) 265298 UNIT/GM ointment Apply topically 2 times daily. 60 g 0    Calcium-Vitamin D-Vitamin K (VIACTIV CALCIUM PLUS D) 650-12.5-40 MG-MCG-MCG CHEW Take by mouth Viactiv      levothyroxine (SYNTHROID) 88 MCG tablet Take 88 mcg by mouth Daily Patient takes 88 mcg every day but Sunday she takes 1.5 pills. Magic Mouthwash (MIRACLE MOUTHWASH) Swish and spit 5 mLs 4 times daily as needed for Irritation 240 mL 1    aspirin 81 MG tablet Take 81 mg by mouth daily       No current facility-administered medications on file prior to visit. Allergies   Allergen Reactions    Codeine Shortness Of Breath     Unknown       Review of Systems   Constitutional:  Negative for activity change, appetite change and fever. HENT:  Negative for congestion and nosebleeds. Eyes:  Negative for discharge. Respiratory:  Negative for cough and wheezing. Cardiovascular:  Negative for chest pain and leg swelling. Gastrointestinal:  Negative for abdominal pain, diarrhea, nausea and vomiting. Genitourinary:  Negative for difficulty urinating, frequency and urgency. Musculoskeletal:  Negative for back pain and gait problem. Skin:  Negative for color change and rash. Neurological:  Negative for dizziness and headaches. Hematological:  Does not bruise/bleed easily. Psychiatric/Behavioral:  Negative for sleep disturbance and suicidal ideas. OBJECTIVE:    Physical Exam  Vitals reviewed. Constitutional:       General: She is not in acute distress. Appearance: Normal appearance. She is well-developed. She is not diaphoretic.    HENT:      Head: Normocephalic and atraumatic. Right Ear: External ear normal.      Left Ear: External ear normal.   Cardiovascular:      Rate and Rhythm: Normal rate and regular rhythm. Pulses: Normal pulses. Heart sounds: Normal heart sounds. No murmur heard. Pulmonary:      Effort: Pulmonary effort is normal. No respiratory distress. Breath sounds: Normal breath sounds. Musculoskeletal:      Cervical back: Normal range of motion and neck supple. Skin:     General: Skin is warm and dry. Neurological:      General: No focal deficit present. Mental Status: She is alert and oriented to person, place, and time. Mental status is at baseline. Psychiatric:         Mood and Affect: Mood normal.         Behavior: Behavior normal.         Thought Content: Thought content normal.         Judgment: Judgment normal.      /80   Pulse 61   Temp 97.1 °F (36.2 °C)   Ht 5' 3\" (1.6 m)   Wt 218 lb (98.9 kg)   SpO2 97%   BMI 38.62 kg/m²      ASSESSMENT/PLAN:    1. Essential hypertension  -     CBC with Auto Differential  -     Comprehensive Metabolic Panel  -     Lipid Panel  2. Mixed hyperlipidemia  -     CBC with Auto Differential  -     Comprehensive Metabolic Panel  -     Lipid Panel  3. Hypercholesterolemia  -     CBC with Auto Differential  -     Comprehensive Metabolic Panel  -     Lipid Panel  4. Decreased renal function  5. Acquired hypothyroidism       Continue candesartan, indapamide, propranolol for blood pressure control. Continue sertraline for anxiety. We are going to check labs on her today and we will notify her of the results. She was fasting today. Continue to follow with Mindi Buck regarding her osteoporosis. Continue to follow with Mindi Buck regarding her mammograms. Follow-up with us in 6 months for a checkup unless needed sooner. I am going to have Lady Mukherjee reach out to her to do her Medicare annual wellness.     PDMP Monitoring:    Last PDMP Elnor Power as Reviewed [facility]):  Review User Review Instant Review Result            Urine Drug Screenings (1 yr)    No resulted procedures found. Medication Contract and Consent for Opioid Use Documents Filed       Patient Documents       Type of Document Status Date Received Received By Description    Medication Contract [Status Missing]  CUEVAS, [address] 7.5                      EMR Dragon/transcription disclaimer:  Much of this encounter note is electronic transcription/translation of spoken language toprinted texts. The electronic translation of spoken language may be erroneous, or at times, nonsensical words or phrases may be inadvertently transcribed.   Although I have reviewed the note for such errors, some may stillexist.

## 2022-09-09 ENCOUNTER — PATIENT MESSAGE (OUTPATIENT)
Dept: PRIMARY CARE CLINIC | Age: 77
End: 2022-09-09

## 2022-09-09 NOTE — TELEPHONE ENCOUNTER
From: Wicho Gillis  To: Dr. Mark Hinton: 9/9/2022 9:16 AM CDT  Subject: Prescriptions for place on face     3 questions:  1) I was supposed to get samples for my incontinence issue. Doctor forgot and I did too but I can  anytime. 2) Doctor was supposed to call in two prescriptions for a break out on my face. 3) I saw from her notes that I am supposed to come back in 6 months so I need that appointment. Thank you.

## 2022-09-14 ENCOUNTER — TELEMEDICINE (OUTPATIENT)
Dept: PRIMARY CARE CLINIC | Age: 77
End: 2022-09-14
Payer: MEDICARE

## 2022-09-14 DIAGNOSIS — Z00.00 MEDICARE ANNUAL WELLNESS VISIT, SUBSEQUENT: Primary | ICD-10-CM

## 2022-09-14 PROBLEM — S72.401A CLOSED FRACTURE OF RIGHT DISTAL FEMUR (HCC): Status: ACTIVE | Noted: 2019-03-25

## 2022-09-14 PROBLEM — K44.9 HIATAL HERNIA: Status: ACTIVE | Noted: 2022-09-14

## 2022-09-14 PROBLEM — S72.451A CLOSED DISPLACED SUPRACONDYLAR FRACTURE WITHOUT INTRACONDYLAR EXTENSION OF LOWER END OF RIGHT FEMUR (HCC): Status: ACTIVE | Noted: 2022-09-14

## 2022-09-14 PROBLEM — I10 BENIGN ESSENTIAL HYPERTENSION: Status: ACTIVE | Noted: 2022-09-14

## 2022-09-14 PROBLEM — G47.20 SLEEP PATTERN DISTURBANCE: Status: ACTIVE | Noted: 2022-09-14

## 2022-09-14 PROBLEM — H43.819 VITREOUS DEGENERATION: Status: ACTIVE | Noted: 2021-08-30

## 2022-09-14 PROBLEM — H04.129 TEAR FILM INSUFFICIENCY: Status: ACTIVE | Noted: 2021-08-30

## 2022-09-14 PROBLEM — F41.1 ANXIETY STATE: Status: ACTIVE | Noted: 2022-09-14

## 2022-09-14 PROBLEM — S63.269A: Status: ACTIVE | Noted: 2022-09-14

## 2022-09-14 PROBLEM — J34.2 ACQUIRED DEVIATED NASAL SEPTUM: Status: ACTIVE | Noted: 2020-01-09

## 2022-09-14 PROBLEM — H91.93 BILATERAL HEARING LOSS: Status: ACTIVE | Noted: 2022-09-14

## 2022-09-14 PROBLEM — H92.09 OTALGIA: Status: ACTIVE | Noted: 2022-09-14

## 2022-09-14 PROBLEM — S63.269A: Status: ACTIVE | Noted: 2018-08-21

## 2022-09-14 PROBLEM — H02.839 DERMATOCHALASIS: Status: ACTIVE | Noted: 2021-10-01

## 2022-09-14 PROBLEM — K58.9 IRRITABLE BOWEL SYNDROME: Status: ACTIVE | Noted: 2022-09-14

## 2022-09-14 PROBLEM — H00.19 CHALAZION: Status: ACTIVE | Noted: 2021-08-30

## 2022-09-14 PROBLEM — M26.629 ARTHRALGIA OF TEMPOROMANDIBULAR JOINT: Status: ACTIVE | Noted: 2022-09-14

## 2022-09-14 PROBLEM — D23.9 BENIGN NEOPLASM OF SKIN: Status: ACTIVE | Noted: 2022-09-14

## 2022-09-14 PROBLEM — S72.309A CLOSED FRACTURE OF SHAFT OF FEMUR (HCC): Status: ACTIVE | Noted: 2019-04-22

## 2022-09-14 PROBLEM — S02.2XXA CLOSED FRACTURE OF NASAL BONES: Status: ACTIVE | Noted: 2019-04-30

## 2022-09-14 PROBLEM — Z96.1 PSEUDOPHAKIA: Status: ACTIVE | Noted: 2021-08-30

## 2022-09-14 PROBLEM — M25.519 SHOULDER PAIN: Status: ACTIVE | Noted: 2022-09-14

## 2022-09-14 PROBLEM — S72.451D: Status: ACTIVE | Noted: 2022-09-14

## 2022-09-14 PROBLEM — T14.90XA INJURY: Status: ACTIVE | Noted: 2018-04-22

## 2022-09-14 PROBLEM — M85.80 OTHER SPECIFIED DISORDERS OF BONE DENSITY AND STRUCTURE, UNSPECIFIED SITE: Status: ACTIVE | Noted: 2019-06-05

## 2022-09-14 PROBLEM — M85.852 OSTEOPENIA OF NECK OF LEFT FEMUR: Status: ACTIVE | Noted: 2022-09-14

## 2022-09-14 PROBLEM — S72.351D CLOSED DISPLACED COMMINUTED FRACTURE OF SHAFT OF RIGHT FEMUR WITH ROUTINE HEALING: Status: ACTIVE | Noted: 2022-09-14

## 2022-09-14 PROBLEM — S72.351A: Status: ACTIVE | Noted: 2022-09-14

## 2022-09-14 PROBLEM — K57.32 DIVERTICULITIS OF COLON: Status: ACTIVE | Noted: 2022-09-14

## 2022-09-14 PROBLEM — N76.0 VAGINITIS AND VULVOVAGINITIS: Status: ACTIVE | Noted: 2022-09-14

## 2022-09-14 PROBLEM — K21.9 GASTROESOPHAGEAL REFLUX DISEASE: Status: ACTIVE | Noted: 2022-09-14

## 2022-09-14 PROBLEM — H02.9 LESION OF UPPER EYELID: Status: ACTIVE | Noted: 2022-09-14

## 2022-09-14 PROBLEM — E66.9 OBESITY: Status: ACTIVE | Noted: 2022-09-14

## 2022-09-14 PROBLEM — K57.12 DIVERTICULITIS OF SMALL INTESTINE: Status: ACTIVE | Noted: 2022-09-14

## 2022-09-14 PROBLEM — H35.372 PUCKERING OF MACULA, LEFT EYE: Status: ACTIVE | Noted: 2021-08-30

## 2022-09-14 PROBLEM — E78.5 HYPERLIPIDEMIA: Status: ACTIVE | Noted: 2022-09-14

## 2022-09-14 PROCEDURE — 1123F ACP DISCUSS/DSCN MKR DOCD: CPT | Performed by: FAMILY MEDICINE

## 2022-09-14 PROCEDURE — G0439 PPPS, SUBSEQ VISIT: HCPCS | Performed by: FAMILY MEDICINE

## 2022-09-14 SDOH — HEALTH STABILITY: PHYSICAL HEALTH: ON AVERAGE, HOW MANY DAYS PER WEEK DO YOU ENGAGE IN MODERATE TO STRENUOUS EXERCISE (LIKE A BRISK WALK)?: 5 DAYS

## 2022-09-14 SDOH — HEALTH STABILITY: PHYSICAL HEALTH: ON AVERAGE, HOW MANY MINUTES DO YOU ENGAGE IN EXERCISE AT THIS LEVEL?: 20 MIN

## 2022-09-14 ASSESSMENT — LIFESTYLE VARIABLES
HOW OFTEN DO YOU HAVE A DRINK CONTAINING ALCOHOL: 1
HOW OFTEN DO YOU HAVE A DRINK CONTAINING ALCOHOL: NEVER
HOW MANY STANDARD DRINKS CONTAINING ALCOHOL DO YOU HAVE ON A TYPICAL DAY: 0
HOW MANY STANDARD DRINKS CONTAINING ALCOHOL DO YOU HAVE ON A TYPICAL DAY: PATIENT DOES NOT DRINK
HOW OFTEN DO YOU HAVE SIX OR MORE DRINKS ON ONE OCCASION: 1

## 2022-09-14 ASSESSMENT — PATIENT HEALTH QUESTIONNAIRE - PHQ9
2. FEELING DOWN, DEPRESSED OR HOPELESS: 0
SUM OF ALL RESPONSES TO PHQ QUESTIONS 1-9: 0
SUM OF ALL RESPONSES TO PHQ9 QUESTIONS 1 & 2: 0
1. LITTLE INTEREST OR PLEASURE IN DOING THINGS: 0
SUM OF ALL RESPONSES TO PHQ QUESTIONS 1-9: 0

## 2022-09-14 NOTE — PATIENT INSTRUCTIONS
Personalized Preventive Plan for [place] - 9/14/2022  Medicare offers a range of preventive health benefits. Some of the tests and screenings are paid in full while other may be subject to a deductible, co-insurance, and/or copay. Some of these benefits include a comprehensive review of your medical history including lifestyle, illnesses that may run in your family, and various assessments and screenings as appropriate. After reviewing your medical record and screening and assessments performed today your provider may have ordered immunizations, labs, imaging, and/or referrals for you. A list of these orders (if applicable) as well as your Preventive Care list are included within your After Visit Summary for your review. Other Preventive Recommendations:    A preventive eye exam performed by an eye specialist is recommended every 1-2 years to screen for glaucoma; cataracts, macular degeneration, and other eye disorders. A preventive dental visit is recommended every 6 months. Try to get at least 150 minutes of exercise per week or 10,000 steps per day on a pedometer . Order or download the FREE \"Exercise & Physical Activity: Your Everyday Guide\" from [employer]. Call 6-606.326.7241 or search [employer] online. You need 9479-0491 mg of calcium and 5006-2790 IU of vitamin D per day. It is possible to meet your calcium requirement with diet alone, but a vitamin D supplement is usually necessary to meet this goal.  When exposed to the sun, use a sunscreen that protects against both UVA and UVB radiation with an SPF of 30 or greater. Reapply every 2 to 3 hours or after sweating, drying off with a towel, or swimming. Always wear a seat belt when traveling in a car. Always wear a helmet when riding a bicycle or motorcycle. Heart-Healthy Diet   Sodium, Fat, and Cholesterol Controlled Diet       What Is a Heart Healthy Diet?    A heart-healthy diet is one that limits sodium , certain types of fat , and cholesterol . This type of diet is recommended for:   People with any form of cardiovascular disease (eg, coronary heart disease , peripheral vascular disease , previous heart attack , previous stroke )   People with risk factors for cardiovascular disease, such as high blood pressure , high cholesterol , or diabetes   Anyone who wants to lower their risk of developing cardiovascular disease   Sodium    Sodium is a mineral found in many foods. In general, most people consume much more sodium than they need. Diets high in sodium can increase blood pressure and lead to edema (water retention). On a heart-healthy diet, you should consume no more than 2,300 mg (milligrams) of sodium per dayabout the amount in one teaspoon of table salt. The foods highest in sodium include table salt (about 50% sodium), processed foods, convenience foods, and preserved foods. Cholesterol    Cholesterol is a fat-like, waxy substance in your blood. Our bodies make some cholesterol. It is also found in animal products, with the highest amounts in fatty meat, egg yolks, whole milk, cheese, shellfish, and organ meats. On a heart-healthy diet, you should limit your cholesterol intake to less than 200 mg per day. It is normal and important to have some cholesterol in your bloodstream. But too much cholesterol can cause plaque to build up within your arteries, which can eventually lead to a heart attack or stroke. The two types of cholesterol that are most commonly referred to are:   Low-density lipoprotein (LDL) cholesterol  Also known as bad cholesterol, this is the cholesterol that tends to build up along your arteries. Bad cholesterol levels are increased by eating fats that are saturated or hydrogenated. Optimal level of this cholesterol is less than 100. Over 130 starts to get risky for heart disease.    High-density lipoprotein (HDL) cholesterol  Also known as good cholesterol, this type of cholesterol actually carries cholesterol away from your arteries and may, therefore, help lower your risk of having a heart attack. You want this level to be high (ideally greater than 60). It is a risk to have a level less than 40. You can raise this good cholesterol by eating olive oil, canola oil, avocados, or nuts. Exercise raises this level, too. Fat    Fat is calorie dense and packs a lot of calories into a small amount of food. Even though fats should be limited due to their high calorie content, not all fats are bad. In fact, some fats are quite healthful. Fat can be broken down into four main types. The good-for-you fats are:   Monounsaturated fat  found in oils such as olive and canola, avocados, and nuts and natural nut butters; can decrease cholesterol levels, while keeping levels of HDL cholesterol high   Polyunsaturated fat  found in oils such as safflower, sunflower, soybean, corn, and sesame; can decrease total cholesterol and LDL cholesterol   Omega-3 fatty acids  particularly those found in fatty fish (such as salmon, trout, tuna, mackerel, herring, and sardines); can decrease risk of arrhythmias, decrease triglyceride levels, and slightly lower blood pressure   The fats that you want to limit are:   Saturated fat  found in animal products, many fast foods, and a few vegetables; increases total blood cholesterol, including LDL levels   Animal fats that are saturated include: butter, lard, whole-milk dairy products, meat fat, and poultry skin   Vegetable fats that are saturated include: hydrogenated shortening, palm oil, coconut oil, cocoa butter   Hydrogenated or trans fat  found in margarine and vegetable shortening, most shelf stable snack foods, and fried foods; increases LDL and decreases HDL     It is generally recommended that you limit your total fat for the day to less than 30% of your total calories.  If you follow an 1800-calorie heart healthy diet, for example, this would mean 60 grams of fat or less per day. Saturated fat and trans fat in your diet raises your blood cholesterol the most, much more than dietary cholesterol does. For this reason, on a heart-healthy diet, less than 7% of your calories should come from saturated fat and ideally 0% from trans fat. On an 1800-calorie diet, this translates into less than 14 grams of saturated fat per day, leaving 46 grams of fat to come from mono- and polyunsaturated fats.    Food Choices on a Heart Healthy Diet   Food Category   Foods Recommended   Foods to Avoid   Grains   Breads and rolls without salted tops Most dry and cooked cereals Unsalted crackers and breadsticks Low-sodium or homemade breadcrumbs or stuffing All rice and pastas   Breads, rolls, and crackers with salted tops High-fat baked goods (eg, muffins, donuts, pastries) Quick breads, self-rising flour, and biscuit mixes Regular bread crumbs Instant hot cereals Commercially prepared rice, pasta, or stuffing mixes   Vegetables   Most fresh, frozen, and low-sodium canned vegetables Low-sodium and salt-free vegetable juices Canned vegetables if unsalted or rinsed   Regular canned vegetables and juices, including sauerkraut and pickled vegetables Frozen vegetables with sauces Commercially prepared potato and vegetable mixes   Fruits   Most fresh, frozen, and canned fruits All fruit juices   Fruits processed with salt or sodium   Milk   Nonfat or low-fat (1%) milk Nonfat or low-fat yogurt Cottage cheese, low-fat ricotta, cheeses labeled as low-fat and low-sodium   Whole milk Reduced-fat (2%) milk Malted and chocolate milk Full fat yogurt Most cheeses (unless low-fat and low salt) Buttermilk (no more than 1 cup per week)   Meats and Beans   Lean cuts of fresh or frozen beef, veal, lamb, or pork (look for the word loin) Fresh or frozen poultry without the skin Fresh or frozen fish and some shellfish Egg whites and egg substitutes (Limit whole eggs to three per week) Tofu Nuts or seeds (unsalted, dry-roasted), low-sodium peanut butter Dried peas, beans, and lentils   Any smoked, cured, salted, or canned meat, fish, or poultry (including corey, chipped beef, cold cuts, hot dogs, sausages, sardines, and anchovies) Poultry skins Breaded and/or fried fish or meats Canned peas, beans, and lentils Salted nuts   Fats and Oils   Olive oil and canola oil Low-sodium, low-fat salad dressings and mayonnaise   Butter, margarine, coconut and palm oils, corey fat   Snacks, Sweets, and Condiments   Low-sodium or unsalted versions of broths, soups, soy sauce, and condiments Pepper, herbs, and spices; vinegar, lemon, or lime juice Low-fat frozen desserts (yogurt, sherbet, fruit bars) Sugar, cocoa powder, honey, syrup, jam, and preserves Low-fat, trans-fat free cookies, cakes, and pies Speedy and animal crackers, fig bars, bogdan snaps   High-fat desserts Broth, soups, gravies, and sauces, made from instant mixes or other high-sodium ingredients Salted snack foods Canned olives Meat tenderizers, seasoning salt, and most flavored vinegars   Beverages   Low-sodium carbonated beverages Tea and coffee in moderation Soy milk   Commercially softened water   Suggestions   Make whole grains, fruits, and vegetables the base of your diet. Choose heart-healthy fats such as canola, olive, and flaxseed oil, and foods high in heart-healthy fats, such as nuts, seeds, soybeans, tofu, and fish. Eat fish at least twice per week; the fish highest in omega-3 fatty acids and lowest in mercury include salmon, herring, mackerel, sardines, and canned chunk light tuna. If you eat fish less than twice per week or have high triglycerides, talk to your doctor about taking fish oil supplements. Read food labels. For products low in fat and cholesterol, look for fat free, low-fat, cholesterol free, saturated fat free, and trans fat freeAlso scan the Nutrition Facts Label, which lists saturated fat, trans fat, and cholesterol amounts. For products low in sodium, look for sodium free, very low sodium, low sodium, no added salt, and unsalted   Skip the salt when cooking or at the table; if food needs more flavor, get creative and try out different herbs and spices. Garlic and onion also add substantial flavor to foods. Trim any visible fat off meat and poultry before cooking, and drain the fat off after kraus. Use cooking methods that require little or no added fat, such as grilling, boiling, baking, poaching, broiling, roasting, steaming, stir-frying, and sauting. Avoid fast food and convenience food. They tend to be high in saturated and trans fat and have a lot of added salt. Talk to a registered dietitian for individualized diet advice. Last Reviewed: March 2011 Brita Dubin, MS, MPH, RD   Updated: 3/29/2011     Heart-Healthy Diet   Sodium, Fat, and Cholesterol Controlled Diet       What Is a Heart Healthy Diet? A heart-healthy diet is one that limits sodium , certain types of fat , and cholesterol . This type of diet is recommended for:   People with any form of cardiovascular disease (eg, coronary heart disease , peripheral vascular disease , previous heart attack , previous stroke )   People with risk factors for cardiovascular disease, such as high blood pressure , high cholesterol , or diabetes   Anyone who wants to lower their risk of developing cardiovascular disease   Sodium    Sodium is a mineral found in many foods. In general, most people consume much more sodium than they need. Diets high in sodium can increase blood pressure and lead to edema (water retention). On a heart-healthy diet, you should consume no more than 2,300 mg (milligrams) of sodium per dayabout the amount in one teaspoon of table salt. The foods highest in sodium include table salt (about 50% sodium), processed foods, convenience foods, and preserved foods. Cholesterol    Cholesterol is a fat-like, waxy substance in your blood.  Our bodies make some cholesterol. It is also found in animal products, with the highest amounts in fatty meat, egg yolks, whole milk, cheese, shellfish, and organ meats. On a heart-healthy diet, you should limit your cholesterol intake to less than 200 mg per day. It is normal and important to have some cholesterol in your bloodstream. But too much cholesterol can cause plaque to build up within your arteries, which can eventually lead to a heart attack or stroke. The two types of cholesterol that are most commonly referred to are:   Low-density lipoprotein (LDL) cholesterol  Also known as bad cholesterol, this is the cholesterol that tends to build up along your arteries. Bad cholesterol levels are increased by eating fats that are saturated or hydrogenated. Optimal level of this cholesterol is less than 100. Over 130 starts to get risky for heart disease. High-density lipoprotein (HDL) cholesterol  Also known as good cholesterol, this type of cholesterol actually carries cholesterol away from your arteries and may, therefore, help lower your risk of having a heart attack. You want this level to be high (ideally greater than 60). It is a risk to have a level less than 40. You can raise this good cholesterol by eating olive oil, canola oil, avocados, or nuts. Exercise raises this level, too. Fat    Fat is calorie dense and packs a lot of calories into a small amount of food. Even though fats should be limited due to their high calorie content, not all fats are bad. In fact, some fats are quite healthful. Fat can be broken down into four main types.    The good-for-you fats are:   Monounsaturated fat  found in oils such as olive and canola, avocados, and nuts and natural nut butters; can decrease cholesterol levels, while keeping levels of HDL cholesterol high   Polyunsaturated fat  found in oils such as safflower, sunflower, soybean, corn, and sesame; can decrease total cholesterol and LDL cholesterol   Omega-3 fatty acids  particularly those found in fatty fish (such as salmon, trout, tuna, mackerel, herring, and sardines); can decrease risk of arrhythmias, decrease triglyceride levels, and slightly lower blood pressure   The fats that you want to limit are:   Saturated fat  found in animal products, many fast foods, and a few vegetables; increases total blood cholesterol, including LDL levels   Animal fats that are saturated include: butter, lard, whole-milk dairy products, meat fat, and poultry skin   Vegetable fats that are saturated include: hydrogenated shortening, palm oil, coconut oil, cocoa butter   Hydrogenated or trans fat  found in margarine and vegetable shortening, most shelf stable snack foods, and fried foods; increases LDL and decreases HDL     It is generally recommended that you limit your total fat for the day to less than 30% of your total calories. If you follow an 1800-calorie heart healthy diet, for example, this would mean 60 grams of fat or less per day. Saturated fat and trans fat in your diet raises your blood cholesterol the most, much more than dietary cholesterol does. For this reason, on a heart-healthy diet, less than 7% of your calories should come from saturated fat and ideally 0% from trans fat. On an 1800-calorie diet, this translates into less than 14 grams of saturated fat per day, leaving 46 grams of fat to come from mono- and polyunsaturated fats.    Food Choices on a Heart Healthy Diet   Food Category   Foods Recommended   Foods to Avoid   Grains   Breads and rolls without salted tops Most dry and cooked cereals Unsalted crackers and breadsticks Low-sodium or homemade breadcrumbs or stuffing All rice and pastas   Breads, rolls, and crackers with salted tops High-fat baked goods (eg, muffins, donuts, pastries) Quick breads, self-rising flour, and biscuit mixes Regular bread crumbs Instant hot cereals Commercially prepared rice, pasta, or stuffing mixes   Vegetables   Most fresh, frozen, tenderizers, seasoning salt, and most flavored vinegars   Beverages   Low-sodium carbonated beverages Tea and coffee in moderation Soy milk   Commercially softened water   Suggestions   Make whole grains, fruits, and vegetables the base of your diet. Choose heart-healthy fats such as canola, olive, and flaxseed oil, and foods high in heart-healthy fats, such as nuts, seeds, soybeans, tofu, and fish. Eat fish at least twice per week; the fish highest in omega-3 fatty acids and lowest in mercury include salmon, herring, mackerel, sardines, and canned chunk light tuna. If you eat fish less than twice per week or have high triglycerides, talk to your doctor about taking fish oil supplements. Read food labels. For products low in fat and cholesterol, look for fat free, low-fat, cholesterol free, saturated fat free, and trans fat freeAlso scan the Nutrition Facts Label, which lists saturated fat, trans fat, and cholesterol amounts. For products low in sodium, look for sodium free, very low sodium, low sodium, no added salt, and unsalted   Skip the salt when cooking or at the table; if food needs more flavor, get creative and try out different herbs and spices. Garlic and onion also add substantial flavor to foods. Trim any visible fat off meat and poultry before cooking, and drain the fat off after kraus. Use cooking methods that require little or no added fat, such as grilling, boiling, baking, poaching, broiling, roasting, steaming, stir-frying, and sauting. Avoid fast food and convenience food. They tend to be high in saturated and trans fat and have a lot of added salt. Talk to a registered dietitian for individualized diet advice. Last Reviewed: March 2011 Tavares Oneal MS, MPH, RD   Updated: 3/29/2011       Preventing Osteoporosis: After Your Visit  Your Care Instructions  Osteoporosis means the bones are weak and thin enough that they can break easily.  The older you are, the more likely you are to get osteoporosis. But with plenty of calcium, vitamin D, and exercise, you can help prevent osteoporosis. The preteen and teen years are a key time for bone building. With the help of calcium, vitamin D, and exercise in those early years and beyond, the bones reach their peak density and strength by age 27. After age 27, your bones naturally start to thin and weaken. The stronger your bones are at around age 27, the lower your risk for osteoporosis. But no matter what your age and risk are, your bones still need calcium, vitamin D, and exercise to stay strong. Also avoid smoking, and limit alcohol. Smoking and heavy alcohol use can make your bones thinner. Talk to your doctor about any special risks you might have, such as having a close relative with osteoporosis or taking a medicine that can weaken bones. Your doctor can tell you the best ways to protect your bones from thinning. Follow-up care is a key part of your treatment and safety. Be sure to make and go to all appointments, and call your doctor if you are having problems. It's also a good idea to know your test results and keep a list of the medicines you take. How can you care for yourself at home? Get enough calcium and vitamin D. The [employer] recommends adults younger than age 46 need 1,000 mg of calcium and 600 IU of vitamin D each day. Women ages 46 to 79 need 1,200 mg of calcium and 600 IU of vitamin D each day. Men ages 46 to 79 need 1,000 mg of calcium and 600 IU of vitamin D each day. Adults 71 and older need 1,200 mg of calcium and 800 IU of vitamin D each day. Eat foods rich in calcium, like yogurt, cheese, milk, and dark green vegetables. Eat foods rich in vitamin D, like eggs, fatty fish, cereal, and fortified milk. Get some sunshine. Your body uses sunshine to make its own vitamin D. The safest time to be out in the sun is before 10 a.m. or after 3 p.m. Avoid getting sunburned.  Sunburn can increase your risk of skin cancer. Talk to your doctor about taking a calcium plus vitamin D supplement. Ask about what type of calcium is right for you, and how much to take at a time. Adults ages 23 to 48 should not get more than 2,500 mg of calcium and 4,000 IU of vitamin D each day, whether it is from supplements and/or food. Adults ages 46 and older should not get more than 2,000 mg of calcium and 4,000 IU of vitamin D each day from supplements and/or food. Get regular bone-building exercise. Weight-bearing and resistance exercises keep bones healthy by working the muscles and bones against gravity. Start out at an exercise level that feels right for you. Add a little at a time until you can do the following:  Do 30 minutes of weight-bearing exercise on most days of the week. Walking, jogging, stair climbing, and dancing are good choices. Do resistance exercises with weights or elastic bands 2 to 3 days a week. Limit alcohol. Drink no more than 1 alcohol drink a day if you are a woman. Drink no more than 2 alcohol drinks a day if you are a man. Do not smoke. Smoking can make bones thin faster. If you need help quitting, talk to your doctor about stop-smoking programs and medicines. These can increase your chances of quitting for good. When should you call for help? Watch closely for changes in your health, and be sure to contact your doctor if:  You need help with a healthy eating plan. You need help with an exercise plan    © 5923-6174 [employer]. Care instructions adapted under license by [facility]. This care instruction is for use with your licensed healthcare professional. If you have questions about a medical condition or this instruction, always ask your healthcare professional. [place] [address] any warranty or liability for your use of this information. Content Version: 9.4.30390;  Last Revised: June 20, 2011                Keep Your Memory Britany Coffman factors can affect your ability to remembera hectic lifestyle, aging, stress, chronic disease, and certain medicines. But, there are steps you can take to sharpen your mind and help preserve your memory. Challenge Your Brain   Regularly challenging your mind may help keeps it in top shape. Good mental exercises include:   Crossword puzzlesUse a dictionary if you need it; you will learn more that way. Brainteasers Try some! Crafts, such as wood working and sewing   Hobbies, such as gardening and building model airplanes   SocializingVisit old friends or join groups to meet new ones. Reading   Learning a new language   Taking a class, whether it be art history or bobo chi   TravelingExperience the food, history, and culture of your destination   Learning to use a computer   Going to museums, the theater, or thought-provoking movies   Changing things in your daily life, such as reversing your pattern in the grocery store or brushing your teeth using your nondominant hand   Use Memory Aids   There is no need to remember every detail on your own. These memory aids can help:   Calendars and day planners   Electronic organizers to store all sorts of helpful informationThese devices can \"beep\" to remind you of appointments. A book of days to record birthdays, anniversaries, and other occasions that occur on the same date every year   Detailed \"to-do\" lists and strategically placed sticky notes   Quick \"study\" sessionsBefore a gathering, review who will be there so their names will be fresh in your mind. Establish routinesFor example, keep your keys, wallet, and umbrella in the same place all the time or take medicine with your 8:00 AM glass of juice   Live a Healthy Life   Many actions that will keep your body strong will do the same for your mind. For example:   Talk to Your Doctor About Herbs and Supplements    Malnutrition and vitamin deficiencies can impair your mental function.  For example, vitamin B12 deficiency can cause a range of symptoms, including confusion. But, what if your nutritional needs are being met? Can herbs and supplements still offer a benefit? Researchers have investigated a range of natural remedies, such as ginkgo , ginseng , and the supplement phosphatidylserine (PS). So far, though, the evidence is inconsistent as to whether these products can improve memory or thinking. If you are interested in taking herbs and supplements, talk to your doctor first because they may interact with other medicines that you are taking. Exercise Regularly    Among the many benefits of regular exercise are increased blood flow to the brain and decreased risk of certain diseases that can interfere with memory function. One study found that even moderate exercise has a beneficial effect. Examples of \"moderate\" exercise include:   Playing 18 holes of golf once a week, without a cart   Playing tennis twice a week   Walking one mile per day   Manage Stress    It can be tough to remember what is important when your mind is cluttered. Make time for relaxation. Choose activities that calm you down, and make it routine. Manage Chronic Conditions    Side effects of high blood pressure , diabetes, and heart disease can interfere with mental function. Many of the lifestyle steps discussed here can help manage these conditions. Strive to eat a healthy diet, exercise regularly, get stress under control, and follow your doctor's advice for your condition. Minimize Medications    Talk to your doctor about the medicines that you take. Some may be unnecessary. Also, healthy lifestyle habits may lower the need for certain drugs. Last Reviewed: April 2010 Juliann Stevenson MD   Updated: 4/13/2010     Keeping Home a [address] [facility]       As we get older, changes in balance, gait, strength, vision, hearing, and cognition make even the most youthful senior more prone to accidents.  Falls are one of the leading health risks for And remember, proper lighting is an essential factor in home safety. If you cannot see clearly, you are more likely to fall. Important questions to ask yourself include:   Are lamp, electric, extension, and telephone cords placed out of the flow of traffic and maintained in good condition? Have frayed cords been replaced? Are all small rugs and runners slip resistant? If not, you can secure them to the floor with a special double-sided carpet tape. Are smoke detectors properly locatedone on every floor of your home and one outside of every sleeping area? Are they in good working order? Are batteries replaced at least once a year? Do you have a well-maintained carbon monoxide detector outside every sleeping are in your home? Does your furniture layout leave plenty of space to maneuver between and around chairs, tables, beds, and sofas? Are hallways, stairs and passages between rooms well lit? Can you reach a lamp without getting out of bed? Are floor surfaces well maintained? Shag rugs, high-pile carpeting, tile floors, and polished wood floors can be particularly slippery. Stairs should always have handrails and be carpeted or fitted with a non-skid tread. Is your telephone easily reachable. Is the cord safely tucked away? Room by Room   According to the Association of Aging, bathrooms and lacie are the two most potentially hazardous rooms in your home. In the Kitchen    Be sure your stove is in proper working order and always make sure burners and the oven are off before you go out or go to sleep. Keep pots on the back burners, turn handles away from the front of the stove, and keep stove clean and free of grease build-up. Kitchen ventilation systems and range exhausts should be working properly. Keep flammable objects such as towels and pot holders away from the cooking area except when in use. Make sure kitchen curtains are tied back.     Move cords and appliances away from the sink and hot surfaces. If extension cords are needed, install wiring guides so they do not hang over the sink, range, or working areas. Look for coffee pots, kettles and toaster ovens with automatic shut-offs. Keep a mop handy in the kitchen so you can wipe up spills instantly. You should also have a small fire extinguisher. Arrange your kitchen with frequently used items on lower shelves to avoid the need to stand on a stepstool to reach them. Make sure countertops are well-lit to avoid injuries while cutting and preparing food. In the Bathroom    Use a non-slip mat or decals in the tub and shower, since wet, soapy tile or porcelain surfaces are extremely slippery. Make sure bathroom rugs are non-skid or tape them firmly to the floor. Bathtubs should have at least one, preferably two, grab bars, firmly attached to structural supports in the wall. (Do not use built-in soap holders or glass shower doors as grab bars.)    Tub seats fitted with non-slip material on the legs allow you to wash sitting down. For people with limited mobility, bathtub transfer benches allow you to slide safely into the tub. Raised toilet seats and toilet safety rails are helpful for those with knee or hip problems. In the [facility]    Make sure you use a nightlight and that the area around your bed is clear of potential obstacles. Be careful with electric blankets and never go to sleep with a heating pad, which can cause serious burns even if on a low setting. Use fire-resistant mattress covers and pillows, and NEVER smoke in bed. Keep a phone next to the bed that is programmed to dial 911 at the push of a button. If you have a chronic condition, you may want to sign on with an automatic call-in service. Typically the system includes a small pendant that connects directly to an emergency medical voice-response system.  You should also make arrangements to stay in contact with someonefriend, neighbor, family memberon a regular schedule. Fire Prevention   According to the [employer]. (Smoke Alarms for Every) [address] [facility] [place], senior citizens are one of the two highest risk groups for death and serious injuries due to residential fires. When cooking, wear short-sleeved items, never a bulky long-sleeved robe. The [facility]'s Safety Checklist for Older Consumers emphasizes the importance of checking basements, garages, workshops and storage areas for fire hazards, such as volatile liquids, piles of old rags or clothing and overloaded circuits. Never smoke in bed or when lying down on a couch or recliner chair. Small portable electric or kerosene heaters are responsible for many home fires and should be used cautiously if at all. If you do use one, be sure to keep them away from flammable materials. In case of fire, make sure you have a pre-established emergency exit plan. Have a professional check your fireplace and other fuel-burning appliances yearly. Helping Hands   Baby boomers entering the ware years will continue to see the development of new products to help older adults live safely and independently in spite of age-related changes. Making Life More Livable  , by Antonio Carr, lists over 1,000 products for \"living well in the mature years,\" such as bathing and mobility aids, household security devices, ergonomically designed knives and peelers, and faucet valves and knobs for temperature control. Medical supply stores and organizations are good sources of information about products that improve your quality of life and insure your safety.      Last Reviewed: November 2009 Tunde Otero MD   Updated: 3/7/2011

## 2022-09-14 NOTE — PROGRESS NOTES
asked to bring a copy to the office at the next visit. Health Habits/Nutrition:  Physical Activity: Insufficiently Active    Days of Exercise per Week: 5 days    Minutes of Exercise per Session: 20 min     Have you lost any weight without trying in the past 3 months?: No     Have you seen the dentist within the past year?: Yes  Health Habits/Nutrition Interventions:  Patient is not able to exercise like she did prior to the fall. Objective      Patient-Reported Vitals  No data recorded     Recent memory is intact. Remote memory is not tested at this time due to the VV per phone. Words were recalled without diff. Allergies   Allergen Reactions    Codeine Shortness Of Breath     Unknown     Prior to Visit Medications    Medication Sig Taking? Authorizing Provider   triamcinolone (KENALOG) 0.1 % ointment Apply topically 2 times daily for 7 days Yes Luz Layton MD   mupirocin (BACTROBAN) 2 % ointment Apply topically 2 times daily Apply topically 3 times daily.  Yes Luz Layton MD   mirabegron (MYRBETRIQ) 50 MG TB24 Take 50 mg by mouth daily Yes Luz Layton MD   colestipol (COLESTID) 1 g tablet TAKE ONE TABLET BY MOUTH TWICE A DAY Yes DANNY Dumont CNP   triamcinolone (KENALOG) 0.1 % cream APPLY TOPICALLY TWO TIMES A DAY FOR 7 DAYS Yes Luz Layton MD   sertraline (ZOLOFT) 100 MG tablet TAKE ONE TABLET BY MOUTH EVERY DAY Yes Luz Layton MD   propranolol (INDERAL LA) 120 MG extended release capsule TAKE ONE CAPSULE BY MOUTH EVERY DAY Yes Luz Layton MD   pravastatin (PRAVACHOL) 10 MG tablet TAKE ONE TABLET BY MOUTH EVERY DAY Yes Luz Layton MD   candesartan (ATACAND) 32 MG tablet TAKE ONE TABLET BY MOUTH EVERY DAY Yes Luz Layton MD   esomeprazole ([address]) 40 MG delayed release capsule TAKE ONE Berta Rued Yes Luz Layton MD indapamide (LOZOL) 2.5 MG tablet TAKE ONE TABLET BY MOUTH EVERY DAY Yes Sharmila Harper MD   nystatin (MYCOSTATIN) 689881 UNIT/GM ointment Apply topically 2 times daily. Yes Paige Swanson MD   Calcium-Vitamin D-Vitamin K (VIACTIV CALCIUM PLUS D) 897-40.1-42 MG-MCG-MCG CHEW Take by mouth Viactiv Yes Historical Provider, MD   levothyroxine (SYNTHROID) 88 MCG tablet Take 88 mcg by mouth Daily Patient takes 88 mcg every day but Sunday she takes 1.5 pills. Yes Historical Provider, MD   Magic Mouthwash (MIRACLE MOUTHWASH) Swish and spit 5 mLs 4 times daily as needed for Irritation Yes Paige Swanson MD   aspirin 81 MG tablet Take 81 mg by mouth daily Yes Historical Provider, MD Stephens (Including outside providers/suppliers regularly involved in providing care):   Patient Care Team:  Paige Swanson MD as PCP - General (Family Medicine)  Paige Swanson MD as PCP - [facility]  Mic Fang MD as Consulting Physician (Cardiology)     Reviewed and updated this visit:  Allergies  Meds       Lab Work was done on 9/8/2022. Health Maintenance was reviewed with the patient and updated. Noble Delvalle, was evaluated through a synchronous (real-time) audio-video encounter. The patient (or guardian if applicable) is aware that this is a billable service, which includes applicable co-pays. This Virtual Visit was conducted with patient's (and/or legal guardian's) consent. The visit was conducted pursuant to the emergency declaration under the [address] [facility], [address] [place] [address] [facility] authority and the [employer] and [employer] General Act. Patient identification was verified, and a caregiver was present when appropriate. The patient was located at Home: [address] [place] [address] [place] [id number]. Provider was located at [facility] (Appt Dept): [address],  [address].      Betty Duggan, LPN, 3/38/2711, performed the documented evaluation under the direct supervision of the attending physician.

## 2022-09-15 NOTE — PROGRESS NOTES
Mom is calling to speak with a nurse. Hernán has a WCC scheduled for feb10th and mom would like his blood drawn at that appt to check his ferritin levels. Mom says he recently had it checked at a sleep dr appt and it was low. Please call mom back. Thanks.    This encounter was performed under my, Suki Little, direct supervision, 9/14/2022.

## 2022-09-27 RX ORDER — PRAVASTATIN SODIUM 10 MG
TABLET ORAL
Qty: 30 TABLET | Refills: 3 | Status: SHIPPED | OUTPATIENT
Start: 2022-09-27

## 2022-09-27 RX ORDER — PROPRANOLOL HYDROCHLORIDE 120 MG/1
CAPSULE, EXTENDED RELEASE ORAL
Qty: 30 CAPSULE | Refills: 3 | Status: SHIPPED | OUTPATIENT
Start: 2022-09-27

## 2022-09-27 RX ORDER — ESOMEPRAZOLE MAGNESIUM 40 MG/1
CAPSULE, DELAYED RELEASE ORAL
Qty: 30 CAPSULE | Refills: 3 | Status: SHIPPED | OUTPATIENT
Start: 2022-09-27

## 2022-09-27 RX ORDER — INDAPAMIDE 2.5 MG/1
TABLET, FILM COATED ORAL
Qty: 30 TABLET | Refills: 3 | Status: SHIPPED | OUTPATIENT
Start: 2022-09-27

## 2022-09-27 RX ORDER — CANDESARTAN 32 MG/1
TABLET ORAL
Qty: 30 TABLET | Refills: 3 | Status: SHIPPED | OUTPATIENT
Start: 2022-09-27

## 2022-11-03 DIAGNOSIS — M79.641 RIGHT HAND PAIN: Primary | ICD-10-CM
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
## 2022-11-18 ENCOUNTER — PATIENT MESSAGE (OUTPATIENT)
Dept: PRIMARY CARE CLINIC | Age: 77
End: 2022-11-18

## 2022-11-18 RX ORDER — FLUCONAZOLE 150 MG/1
150 TABLET ORAL
Qty: 2 TABLET | Refills: 0 | Status: SHIPPED | OUTPATIENT
Start: 2022-11-18 | End: 2022-11-24

## 2022-11-18 NOTE — TELEPHONE ENCOUNTER
From: Delana Landau  To: Dr. Mayra Lennox: 11/18/2022 8:20 AM CST  Subject: Yeast infection    Dr Cooper Spears, I havent had one in a long time but I am sure I have a yeast infection. Mostly that telltale itching. Can you call me in something to take? My OB used to give me something called Diflucan. Thnx.

## 2022-11-28 RX ORDER — SERTRALINE HYDROCHLORIDE 100 MG/1
TABLET, FILM COATED ORAL
Qty: 30 TABLET | Refills: 5 | Status: SHIPPED | OUTPATIENT
Start: 2022-11-28

## 2022-12-07 ENCOUNTER — NURSE ONLY (OUTPATIENT)
Dept: PRIMARY CARE CLINIC | Age: 77
End: 2022-12-07
Payer: MEDICARE

## 2022-12-07 DIAGNOSIS — R30.0 DYSURIA: ICD-10-CM

## 2022-12-07 DIAGNOSIS — R82.998 LEUKOCYTES IN URINE: ICD-10-CM

## 2022-12-07 DIAGNOSIS — R35.0 URINARY FREQUENCY: Primary | ICD-10-CM

## 2022-12-07 LAB
APPEARANCE FLUID: NORMAL
BILIRUBIN, POC: NORMAL
BLOOD URINE, POC: NORMAL
CLARITY, POC: NORMAL
COLOR, POC: YELLOW
GLUCOSE URINE, POC: NORMAL
KETONES, POC: NORMAL
LEUKOCYTE EST, POC: NORMAL
NITRITE, POC: NORMAL
PH, POC: 5
PROTEIN, POC: NORMAL
SPECIFIC GRAVITY, POC: 1
UROBILINOGEN, POC: NORMAL

## 2022-12-07 PROCEDURE — 81002 URINALYSIS NONAUTO W/O SCOPE: CPT | Performed by: FAMILY MEDICINE

## 2022-12-09 LAB — URINE CULTURE, ROUTINE: NORMAL

## 2022-12-19 ENCOUNTER — HOSPITAL ENCOUNTER (OUTPATIENT)
Dept: MAMMOGRAPHY | Facility: HOSPITAL | Age: 77
Discharge: HOME | End: 2022-12-19
Admitting: SPECIALIST

## 2022-12-19 DIAGNOSIS — R92.2 DENSE BREAST TISSUE ON MAMMOGRAM: ICD-10-CM

## 2022-12-19 DIAGNOSIS — Z12.31 SCREENING MAMMOGRAM FOR HIGH-RISK PATIENT: ICD-10-CM

## 2022-12-19 DIAGNOSIS — N60.22 FIBROADENOSIS OF BREAST, LEFT: ICD-10-CM

## 2022-12-19 PROCEDURE — G0279 TOMOSYNTHESIS, MAMMO: HCPCS

## 2022-12-19 PROCEDURE — 77065 DX MAMMO INCL CAD UNI: CPT

## 2022-12-24 NOTE — PROGRESS NOTES
Zahra Paredes MD [facility] Breast follow up note    Referring Provider: No ref. provider found      Chief complaint   Chief Complaint   Patient presents with   • Follow-up     Mrs. Kamara is here today for a 6 month breast exam         Subjective .     History of present illness:  Vanessa Kamara  is a 77 y.o. female who presents for six month follow up status post left stereotactic biopsy.  Pathology demonstrated    Final Diagnosis  Left breast tissue, upper outer quadrant, core biopsies:  Benign breast tissue with fibroadenomatoid change and associated coarse microcalcification.  No atypia, in situ or invasive malignancy.  Electronically signed by Cony Lee MD on 7/1/2022 at 0902:   .      She denies any breast, nipple, or skin changes.      History    The following portions of the patient's history were reviewed and updated as appropriate: allergies, current medications, past family history, past medical history, past social history, past surgical history, and problem list.    Objective     Vital Signs   /70   Pulse 80   Temp 98.5 °F (36.9 °C)   Ht 162.6 cm (64\")   Wt 97.5 kg (215 lb)   LMP  (LMP Unknown)   SpO2 96%   BMI 36.90 kg/m²      Physical Exam:    General The patient is well-developed, well-nourished, and in no acute distress.  Breast  Nipples everted without expressible discharge.  No erythema, edema, induration, or dimpling.  No palpable masses.  No axillary adenopathy.      Imaging:  [facility] mammogram 12/19/22:  benign    Class 2 Severe Obesity (BMI >=35 and <=39.9). Obesity-related health conditions include the following: none. Obesity is newly identified. BMI is is above average; BMI management plan is completed. We discussed portion control and increasing exercise.    Assessment & Plan       Diagnoses and all orders for this visit:    1. Screening mammogram for high-risk patient (Primary)  -     Mammo Screening Bilateral With CAD; Future    2. Fibroadenosis of left breast  -      Mammo Screening Bilateral With CAD; Future    3. Dense breast tissue on mammogram  -     Mammo Screening Bilateral With CAD; Future           An extensive review of patient intake forms, referring physician documents, laboratories, and imaging was performed in the medical decision making and surgical planning of this patient.     The patient will be scheduled for bilateral screening mammograms in six months followed by clinical examination.  She will return sooner with breast, nipple, or skin changes.      Zahra Paredes MD  01/05/23  05:25 CST

## 2022-12-28 RX ORDER — MONTELUKAST SODIUM 4 MG/1
TABLET, CHEWABLE ORAL
Qty: 60 TABLET | Refills: 3 | Status: SHIPPED | OUTPATIENT
Start: 2022-12-28

## 2023-01-04 ENCOUNTER — OFFICE VISIT (OUTPATIENT)
Dept: SURGERY | Facility: CLINIC | Age: 78
End: 2023-01-04
Payer: MEDICARE

## 2023-01-04 VITALS
OXYGEN SATURATION: 96 % | HEIGHT: 64 IN | HEART RATE: 80 BPM | DIASTOLIC BLOOD PRESSURE: 70 MMHG | TEMPERATURE: 98.5 F | SYSTOLIC BLOOD PRESSURE: 145 MMHG | BODY MASS INDEX: 36.7 KG/M2 | WEIGHT: 215 LBS

## 2023-01-04 DIAGNOSIS — Z12.31 SCREENING MAMMOGRAM FOR HIGH-RISK PATIENT: Primary | ICD-10-CM

## 2023-01-04 DIAGNOSIS — R92.2 DENSE BREAST TISSUE ON MAMMOGRAM: ICD-10-CM

## 2023-01-04 DIAGNOSIS — N60.22 FIBROADENOSIS OF LEFT BREAST: ICD-10-CM

## 2023-01-04 PROCEDURE — 99213 OFFICE O/P EST LOW 20 MIN: CPT | Performed by: SPECIALIST

## 2023-01-23 RX ORDER — CANDESARTAN 32 MG/1
TABLET ORAL
Qty: 30 TABLET | Refills: 3 | Status: SHIPPED | OUTPATIENT
Start: 2023-01-23

## 2023-01-23 RX ORDER — PROPRANOLOL HYDROCHLORIDE 120 MG/1
CAPSULE, EXTENDED RELEASE ORAL
Qty: 30 CAPSULE | Refills: 3 | Status: SHIPPED | OUTPATIENT
Start: 2023-01-23

## 2023-01-23 RX ORDER — PRAVASTATIN SODIUM 10 MG
TABLET ORAL
Qty: 30 TABLET | Refills: 3 | Status: SHIPPED | OUTPATIENT
Start: 2023-01-23

## 2023-01-23 RX ORDER — ESOMEPRAZOLE MAGNESIUM 40 MG/1
CAPSULE, DELAYED RELEASE ORAL
Qty: 30 CAPSULE | Refills: 3 | Status: SHIPPED | OUTPATIENT
Start: 2023-01-23

## 2023-01-23 RX ORDER — INDAPAMIDE 2.5 MG/1
TABLET, FILM COATED ORAL
Qty: 30 TABLET | Refills: 3 | Status: SHIPPED | OUTPATIENT
Start: 2023-01-23

## 2023-01-26 ENCOUNTER — OFFICE VISIT (OUTPATIENT)
Dept: OBSTETRICS AND GYNECOLOGY | Facility: CLINIC | Age: 78
End: 2023-01-26
Payer: MEDICARE

## 2023-01-26 VITALS
DIASTOLIC BLOOD PRESSURE: 82 MMHG | SYSTOLIC BLOOD PRESSURE: 122 MMHG | WEIGHT: 214 LBS | BODY MASS INDEX: 37.92 KG/M2 | HEIGHT: 63 IN

## 2023-01-26 DIAGNOSIS — Z01.419 WOMEN'S ANNUAL ROUTINE GYNECOLOGICAL EXAMINATION: Primary | ICD-10-CM

## 2023-01-26 DIAGNOSIS — N39.46 MIXED STRESS AND URGE URINARY INCONTINENCE: ICD-10-CM

## 2023-01-26 DIAGNOSIS — N90.4 LICHEN SCLEROSUS OF FEMALE GENITALIA: ICD-10-CM

## 2023-01-26 DIAGNOSIS — N89.8 VAGINAL ITCHING: ICD-10-CM

## 2023-01-26 PROCEDURE — 99203 OFFICE O/P NEW LOW 30 MIN: CPT | Performed by: NURSE PRACTITIONER

## 2023-01-26 PROCEDURE — G0101 CA SCREEN;PELVIC/BREAST EXAM: HCPCS | Performed by: NURSE PRACTITIONER

## 2023-01-26 PROCEDURE — 2014F MENTAL STATUS ASSESS: CPT | Performed by: NURSE PRACTITIONER

## 2023-01-26 PROCEDURE — 3008F BODY MASS INDEX DOCD: CPT | Performed by: NURSE PRACTITIONER

## 2023-01-26 RX ORDER — LEVOTHYROXINE SODIUM 88 UG/1
TABLET ORAL
COMMUNITY
Start: 2023-01-18

## 2023-01-26 RX ORDER — OXYBUTYNIN CHLORIDE 5 MG/1
5 TABLET, EXTENDED RELEASE ORAL DAILY
Qty: 30 TABLET | Refills: 3 | Status: SHIPPED | OUTPATIENT
Start: 2023-01-26

## 2023-01-26 RX ORDER — CANDESARTAN 32 MG/1
TABLET ORAL
COMMUNITY
Start: 2023-01-23

## 2023-01-26 RX ORDER — CLOBETASOL PROPIONATE 0.5 MG/G
1 CREAM TOPICAL 2 TIMES DAILY
Qty: 60 G | Refills: 3 | Status: SHIPPED | OUTPATIENT
Start: 2023-01-26

## 2023-01-26 NOTE — PROGRESS NOTES
"Chief Complaint   Patient presents with   • Gynecologic Exam     Patient is new to our office and here to establish GYN care.  She states her last GYN exam was many years ago.  Total hyst in the past.  She complains of urinary incontinence, all times through the day for about the last year.  She describes some vaginal irritation.  PCP sent creams and tested urine, ruling out infection.  She has been wearing period panties to catch urine.  She feels the discomfort when she wipes.     • Urinary Frequency       History:  Vanessa Kamara is a 77 y.o. female who presents today for follow-up for evaluation of the above:    HPI     Vanessa Kamara is a 77 y.o. female here today for annual GYN examination. She is menopausal and has not had any recent abnormal Pap smears. She denies any vaginal discharge or bleeding. She is not on hormone replacement therapy.  Her last mammogram was in 12/19/2022 and read as BIRADS 2.   She has c/o urinary incontinence and vaginal irritation.   Has tried myrbetriq in the past that caused headaches and did not see improvement in symptoms but did not take longer than 1 week.  Vaginal irritation has tried \"creams\" but has not seen improvement.         ROS:  Review of Systems   Constitutional: Negative for fatigue and unexpected weight change.   HENT: Negative.    Eyes: Negative.    Respiratory: Negative.    Cardiovascular: Negative.    Gastrointestinal: Negative for abdominal pain, constipation and diarrhea.   Endocrine: Negative.    Genitourinary: Positive for frequency and vaginal pain (irritation). Negative for difficulty urinating, dyspareunia, genital sores, menstrual problem, pelvic pain, vaginal bleeding and vaginal discharge.   Musculoskeletal: Negative.    Skin: Negative.    Neurological: Negative.    Psychiatric/Behavioral: Negative.        Ms. Kamara  reports that she has never smoked. She has never used smokeless tobacco. She reports that she does not drink alcohol and does not use " "drugs.      Current Outpatient Medications:   •  aspirin 81 MG chewable tablet, Chew 81 mg Daily., Disp: , Rfl:   •  candesartan (ATACAND) 32 MG tablet, , Disp: , Rfl:   •  cholecalciferol (VITAMIN D3) 1000 units tablet, Take 2,000 Units by mouth Daily., Disp: , Rfl:   •  colestipol (COLESTID) 1 g tablet, , Disp: , Rfl:   •  diphenoxylate-atropine (LOMOTIL) 2.5-0.025 MG per tablet, Take 1 tablet by mouth 4 (Four) Times a Day As Needed for Diarrhea., Disp: , Rfl:   •  esomeprazole (nexIUM) 40 MG capsule, Take 40 mg by mouth Every Morning Before Breakfast., Disp: , Rfl:   •  indapamide (LOZOL) 2.5 MG tablet, Take 2.5 mg by mouth Daily., Disp: , Rfl:   •  levothyroxine (SYNTHROID, LEVOTHROID) 125 MCG tablet, Take 62.5 mcg by mouth Daily., Disp: , Rfl:   •  levothyroxine (SYNTHROID, LEVOTHROID) 88 MCG tablet, , Disp: , Rfl:   •  ondansetron (ZOFRAN) 4 MG tablet, , Disp: , Rfl:   •  pravastatin (PRAVACHOL) 10 MG tablet, Take 10 mg by mouth Daily., Disp: , Rfl:   •  propranolol XL (INNOPRAN XL) 120 MG 24 hr capsule, Take 1 capsule by mouth Daily., Disp: , Rfl:   •  sertraline (ZOLOFT) 100 MG tablet, Take 100 mg by mouth Daily., Disp: , Rfl:   •  clobetasol (Temovate) 0.05 % cream, Apply 1 application topically to the appropriate area as directed 2 (Two) Times a Day., Disp: 60 g, Rfl: 3  •  oxybutynin XL (DITROPAN-XL) 5 MG 24 hr tablet, Take 1 tablet by mouth Daily., Disp: 30 tablet, Rfl: 3    Current Facility-Administered Medications:   •  lidocaine (XYLOCAINE) 1 % injection 10 mL, 10 mL, Subcutaneous, Once, Shun Joe MD  •  lidocaine 1% - EPINEPHrine 1:924980 (XYLOCAINE W/EPI) 1 %-1:848642 injection 10 mL, 10 mL, Injection, Once, Shun Joe MD      OBJECTIVE:  /82   Ht 160 cm (63\")   Wt 97.1 kg (214 lb)   LMP  (LMP Unknown)   BMI 37.91 kg/m²    Physical Exam  Exam conducted with a chaperone present.   Constitutional:       Appearance: She is well-developed.   HENT:      Head: Normocephalic and " atraumatic.   Eyes:      General: Lids are normal.      Conjunctiva/sclera: Conjunctivae normal.      Pupils: Pupils are equal, round, and reactive to light.   Neck:      Thyroid: No thyromegaly.   Cardiovascular:      Rate and Rhythm: Normal rate and regular rhythm.      Heart sounds: Normal heart sounds.   Pulmonary:      Effort: Pulmonary effort is normal.      Breath sounds: Normal breath sounds.   Chest:   Breasts:     Breasts are symmetrical.      Right: No inverted nipple, mass, nipple discharge, skin change or tenderness.      Left: No inverted nipple, mass, nipple discharge, skin change or tenderness.   Abdominal:      General: Bowel sounds are normal.      Palpations: Abdomen is soft.   Genitourinary:     Exam position: Supine.      Pubic Area: No rash.       Labia:         Right: No rash, tenderness, lesion or injury.         Left: No rash, tenderness, lesion or injury.       Urethra: No prolapse.      Vagina: No signs of injury and foreign body. No vaginal discharge, erythema, tenderness or bleeding.      Uterus: Absent.       Adnexa:         Right: No mass, tenderness or fullness.          Left: No mass, tenderness or fullness.        Rectum: Normal. No tenderness or external hemorrhoid.      Comments: Fusion of labia minor to majora  Vaginal atrophy  Musculoskeletal:         General: Normal range of motion.      Cervical back: Normal range of motion and neck supple.   Skin:     General: Skin is warm and dry.   Neurological:      Mental Status: She is alert and oriented to person, place, and time.         Assessment/Plan    Diagnoses and all orders for this visit:    1. Women's annual routine gynecological examination (Primary)  Immunizations:      - Tetanus: Unknown or >10 years ago. Recommend to have at pharmacy or on injury.      - Influenza: recommended annually      - Pneumovax:once after age 65      - Prevnar: Once after age 65      - Zostavax: Once after age 60  Colon Cancer Screening: discuss  with PCP   Mammogram: Due 12/2023  PAP: s/p hysterectomy   DEXA: done through [employer] vaccine information is available at vaccine.[place].gov     2. Vaginal itching  -     NuSwab BV & Candida - Swab, Vagina    3. Mixed stress and urge urinary incontinence  -     oxybutynin XL (DITROPAN-XL) 5 MG 24 hr tablet; Take 1 tablet by mouth Daily.  Dispense: 30 tablet; Refill: 3  Discussed pessary and poise impressa       4. 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  Dispense: 60 g; Refill: 3    She will schedule a mammogram.  She will followup in one year or sooner if needed.         An After Visit Summary was printed and given to the patient at discharge.  Return in about 6 months (around 7/26/2023). Sooner if problems arise.          Lucy Gaines APRN. 1/27/2023   Electronically Signed

## 2023-01-27 LAB
A VAGINAE DNA VAG QL NAA+PROBE: NORMAL SCORE
BVAB2 DNA VAG QL NAA+PROBE: NORMAL SCORE
C ALBICANS DNA VAG QL NAA+PROBE: NEGATIVE
C GLABRATA DNA VAG QL NAA+PROBE: NEGATIVE
MEGA1 DNA VAG QL NAA+PROBE: NORMAL SCORE

## 2023-02-23 ENCOUNTER — TRANSCRIBE ORDERS (OUTPATIENT)
Dept: ADMINISTRATIVE | Facility: HOSPITAL | Age: 78
End: 2023-02-23
Payer: MEDICARE

## 2023-02-23 ENCOUNTER — OFFICE VISIT (OUTPATIENT)
Dept: PRIMARY CARE CLINIC | Age: 78
End: 2023-02-23
Payer: MEDICARE

## 2023-02-23 VITALS
RESPIRATION RATE: 16 BRPM | OXYGEN SATURATION: 94 % | HEIGHT: 63 IN | SYSTOLIC BLOOD PRESSURE: 112 MMHG | BODY MASS INDEX: 38.84 KG/M2 | DIASTOLIC BLOOD PRESSURE: 86 MMHG | TEMPERATURE: 96 F | HEART RATE: 60 BPM | WEIGHT: 219.2 LBS

## 2023-02-23 DIAGNOSIS — I83.811 VARICOSE VEINS OF RIGHT LOWER EXTREMITY WITH PAIN: Primary | ICD-10-CM

## 2023-02-23 DIAGNOSIS — M79.604 RIGHT LEG PAIN: Primary | ICD-10-CM

## 2023-02-23 PROCEDURE — G8399 PT W/DXA RESULTS DOCUMENT: HCPCS | Performed by: FAMILY MEDICINE

## 2023-02-23 PROCEDURE — 3074F SYST BP LT 130 MM HG: CPT | Performed by: FAMILY MEDICINE

## 2023-02-23 PROCEDURE — G8427 DOCREV CUR MEDS BY ELIG CLIN: HCPCS | Performed by: FAMILY MEDICINE

## 2023-02-23 PROCEDURE — G8484 FLU IMMUNIZE NO ADMIN: HCPCS | Performed by: FAMILY MEDICINE

## 2023-02-23 PROCEDURE — G8417 CALC BMI ABV UP PARAM F/U: HCPCS | Performed by: FAMILY MEDICINE

## 2023-02-23 PROCEDURE — 1123F ACP DISCUSS/DSCN MKR DOCD: CPT | Performed by: FAMILY MEDICINE

## 2023-02-23 PROCEDURE — 1090F PRES/ABSN URINE INCON ASSESS: CPT | Performed by: FAMILY MEDICINE

## 2023-02-23 PROCEDURE — 1036F TOBACCO NON-USER: CPT | Performed by: FAMILY MEDICINE

## 2023-02-23 PROCEDURE — 3078F DIAST BP <80 MM HG: CPT | Performed by: FAMILY MEDICINE

## 2023-02-23 PROCEDURE — 99212 OFFICE O/P EST SF 10 MIN: CPT | Performed by: FAMILY MEDICINE

## 2023-02-23 RX ORDER — OXYBUTYNIN CHLORIDE 5 MG/1
5 TABLET, EXTENDED RELEASE ORAL DAILY
COMMUNITY
Start: 2023-01-26

## 2023-02-23 RX ORDER — CLOBETASOL PROPIONATE 0.5 MG/G
CREAM TOPICAL
COMMUNITY
Start: 2023-01-26 | End: 2023-02-23

## 2023-02-23 SDOH — ECONOMIC STABILITY: FOOD INSECURITY: WITHIN THE PAST 12 MONTHS, YOU WORRIED THAT YOUR FOOD WOULD RUN OUT BEFORE YOU GOT MONEY TO BUY MORE.: NEVER TRUE

## 2023-02-23 SDOH — ECONOMIC STABILITY: FOOD INSECURITY: WITHIN THE PAST 12 MONTHS, THE FOOD YOU BOUGHT JUST DIDN'T LAST AND YOU DIDN'T HAVE MONEY TO GET MORE.: NEVER TRUE

## 2023-02-23 SDOH — ECONOMIC STABILITY: INCOME INSECURITY: HOW HARD IS IT FOR YOU TO PAY FOR THE VERY BASICS LIKE FOOD, HOUSING, MEDICAL CARE, AND HEATING?: NOT HARD AT ALL

## 2023-02-23 SDOH — ECONOMIC STABILITY: HOUSING INSECURITY
IN THE LAST 12 MONTHS, WAS THERE A TIME WHEN YOU DID NOT HAVE A STEADY PLACE TO SLEEP OR SLEPT IN A SHELTER (INCLUDING NOW)?: NO

## 2023-02-23 ASSESSMENT — PATIENT HEALTH QUESTIONNAIRE - PHQ9
SUM OF ALL RESPONSES TO PHQ QUESTIONS 1-9: 0
1. LITTLE INTEREST OR PLEASURE IN DOING THINGS: 0
SUM OF ALL RESPONSES TO PHQ QUESTIONS 1-9: 0
SUM OF ALL RESPONSES TO PHQ QUESTIONS 1-9: 0
2. FEELING DOWN, DEPRESSED OR HOPELESS: 0
SUM OF ALL RESPONSES TO PHQ9 QUESTIONS 1 & 2: 0
SUM OF ALL RESPONSES TO PHQ QUESTIONS 1-9: 0

## 2023-02-24 ASSESSMENT — ENCOUNTER SYMPTOMS
BACK PAIN: 0
NAUSEA: 0
COLOR CHANGE: 0
COUGH: 0
WHEEZING: 0
VOMITING: 0
EYE DISCHARGE: 0
DIARRHEA: 0
ABDOMINAL PAIN: 0

## 2023-02-24 NOTE — PROGRESS NOTES
SUBJECTIVE:    Patient ID: Sushma Jaimes is a 65 y.o. female. HPI:   Patient is seen today for complaints of pain in her leg. She states that it has been going on for the last few weeks. She states that she has had some swelling behind the right knee which inhibits what she is able to do and bending her knee. She states that she has not had any injury to this area. She states that she has not had any work-up done for it to this point. She has not had any lower leg swelling or pain and has just been mostly behind the knee. She denies any redness or warmth. She denies any rash in that area. She has never been told she has a Baker's cyst or had a blood clot in that leg previously. Of note she did have a femur fracture of that leg previously. Past Medical History:   Diagnosis Date    Carpal tunnel syndrome, right     HIGH CHOLESTEROL     Hypertension     Hypothyroidism     Osteoporosis       Current Outpatient Medications on File Prior to Visit   Medication Sig Dispense Refill    oxybutynin (DITROPAN-XL) 5 MG extended release tablet Take 5 mg by mouth daily      pravastatin (PRAVACHOL) 10 MG tablet TAKE ONE TABLET BY MOUTH EVERY DAY 30 tablet 3    esomeprazole (NEXIUM) 40 MG delayed release capsule TAKE ONE CAPSULE BY MOUTH EVERY MORNING BEFORE BREAKFAST 30 capsule 3    candesartan (ATACAND) 32 MG tablet TAKE ONE TABLET BY MOUTH EVERY DAY 30 tablet 3    indapamide (LOZOL) 2.5 MG tablet TAKE ONE TABLET BY MOUTH EVERY DAY 30 tablet 3    propranolol (INDERAL LA) 120 MG extended release capsule TAKE ONE CAPSULE BY MOUTH EVERY DAY 30 capsule 3    colestipol (COLESTID) 1 g tablet TAKE ONE TABLET BY MOUTH TWICE A DAY 60 tablet 3    sertraline (ZOLOFT) 100 MG tablet TAKE ONE TABLET BY MOUTH EVERY DAY 30 tablet 5    mupirocin (BACTROBAN) 2 % ointment Apply topically 2 times daily Apply topically 3 times daily.  30 g 0    mirabegron (MYRBETRIQ) 50 MG TB24 Take 50 mg by mouth daily 30 tablet 1    triamcinolone (KENALOG) 0.1 % cream APPLY TOPICALLY TWO TIMES A DAY FOR 7 DAYS 60 g 0    nystatin (MYCOSTATIN) 286910 UNIT/GM ointment Apply topically 2 times daily. 60 g 0    Calcium-Vitamin D-Vitamin K (VIACTIV CALCIUM PLUS D) 650-12.5-40 MG-MCG-MCG CHEW Take by mouth Viactiv      levothyroxine (SYNTHROID) 88 MCG tablet Take 88 mcg by mouth Daily Patient takes 88 mcg every day but Sunday she takes 1.5 pills. Magic Mouthwash (MIRACLE MOUTHWASH) Swish and spit 5 mLs 4 times daily as needed for Irritation 240 mL 1    aspirin 81 MG tablet Take 81 mg by mouth daily       No current facility-administered medications on file prior to visit. Allergies   Allergen Reactions    Codeine Shortness Of Breath     Unknown       Review of Systems   Constitutional:  Negative for activity change, appetite change and fever. HENT:  Negative for congestion and nosebleeds. Eyes:  Negative for discharge. Respiratory:  Negative for cough and wheezing. Cardiovascular:  Negative for chest pain and leg swelling. Gastrointestinal:  Negative for abdominal pain, diarrhea, nausea and vomiting. Genitourinary:  Negative for difficulty urinating, frequency and urgency. Musculoskeletal:  Negative for back pain and gait problem. Skin:  Negative for color change and rash. Neurological:  Negative for dizziness and headaches. Hematological:  Does not bruise/bleed easily. Psychiatric/Behavioral:  Negative for sleep disturbance and suicidal ideas. OBJECTIVE:    Physical Exam  Vitals reviewed. Constitutional:       General: She is not in acute distress. Appearance: Normal appearance. She is well-developed. She is not diaphoretic. HENT:      Head: Normocephalic and atraumatic. Right Ear: External ear normal.      Left Ear: External ear normal.   Cardiovascular:      Rate and Rhythm: Normal rate and regular rhythm. Pulses: Normal pulses. Heart sounds: Normal heart sounds. No murmur heard.   Pulmonary: Effort: Pulmonary effort is normal. No respiratory distress. Breath sounds: Normal breath sounds. Musculoskeletal:      Cervical back: Normal range of motion and neck supple. Right knee: Tenderness (posterior knee space with no palpable masses noted) present. Comments: No cords palpable in lower extremity. No signficant swelling in right lower ext compared to left   Skin:     General: Skin is warm and dry. Neurological:      General: No focal deficit present. Mental Status: She is alert and oriented to person, place, and time. Mental status is at baseline. Psychiatric:         Mood and Affect: Mood normal.         Behavior: Behavior normal.         Thought Content: Thought content normal.         Judgment: Judgment normal.      /86   Pulse 60   Temp (!) 96 °F (35.6 °C) (Temporal)   Resp 16   Ht 5' 3\" (1.6 m)   Wt 219 lb 3.2 oz (99.4 kg)   SpO2 94%   BMI 38.83 kg/m²      ASSESSMENT/PLAN:    1. Right leg pain  -     US EXTREMITY RIGHT NON VASC LIMITED; Future       We are going to get her set up for an ultrasound of her leg to rule out a Baker's cyst.  Discussed that most likely this is what is causing the symptoms she is having. Encouraged her to do ice behind her knee and to stretch and elevate it. If it does show the Baker's cyst and the pain is worsening she is to let me know we will put a referral in for orthopedics. PDMP Monitoring:    Last PDMP Bud as Reviewed [facility]):  Review User Review Instant Review Result            Urine Drug Screenings (1 yr)    No resulted procedures found. Medication Contract and Consent for Opioid Use Documents Filed       Patient Documents       Type of Document Status Date Received Received By Description    Medication Contract [Status Missing]  MASON 12[address] [facility] 7.5mg                      EMR Dragon/transcription disclaimer:  Much of this encounter note is electronic transcription/translation of spoken language toprinted texts. The electronic translation of spoken language may be erroneous, or at times, nonsensical words or phrases may be inadvertently transcribed.   Although I have reviewed the note for such errors, some may stillexist.

## 2023-02-28 ENCOUNTER — HOSPITAL ENCOUNTER (OUTPATIENT)
Dept: ULTRASOUND IMAGING | Facility: HOSPITAL | Age: 78
Discharge: HOME | End: 2023-02-28
Admitting: FAMILY MEDICINE
Payer: MEDICARE

## 2023-02-28 DIAGNOSIS — M71.21 SYNOVIAL CYST OF RIGHT POPLITEAL SPACE: ICD-10-CM

## 2023-02-28 DIAGNOSIS — M79.604 RIGHT LEG PAIN: ICD-10-CM

## 2023-02-28 PROCEDURE — 76882 US LMTD JT/FCL EVL NVASC XTR: CPT

## 2023-03-01 DIAGNOSIS — M71.21 SYNOVIAL CYST OF RIGHT POPLITEAL SPACE: Primary | ICD-10-CM

## 2023-04-24 RX ORDER — MONTELUKAST SODIUM 4 MG/1
TABLET, CHEWABLE ORAL
Qty: 60 TABLET | Refills: 3 | Status: SHIPPED | OUTPATIENT
Start: 2023-04-24

## 2023-04-26 ENCOUNTER — OFFICE VISIT (OUTPATIENT)
Dept: CARDIOLOGY CLINIC | Age: 78
End: 2023-04-26
Payer: MEDICARE

## 2023-04-26 VITALS
HEART RATE: 56 BPM | HEIGHT: 63 IN | SYSTOLIC BLOOD PRESSURE: 128 MMHG | BODY MASS INDEX: 41.82 KG/M2 | DIASTOLIC BLOOD PRESSURE: 74 MMHG | WEIGHT: 236 LBS

## 2023-04-26 DIAGNOSIS — I10 ESSENTIAL HYPERTENSION: Primary | ICD-10-CM

## 2023-04-26 PROCEDURE — G8399 PT W/DXA RESULTS DOCUMENT: HCPCS | Performed by: INTERNAL MEDICINE

## 2023-04-26 PROCEDURE — 99213 OFFICE O/P EST LOW 20 MIN: CPT | Performed by: INTERNAL MEDICINE

## 2023-04-26 PROCEDURE — 3074F SYST BP LT 130 MM HG: CPT | Performed by: INTERNAL MEDICINE

## 2023-04-26 PROCEDURE — 93000 ELECTROCARDIOGRAM COMPLETE: CPT | Performed by: INTERNAL MEDICINE

## 2023-04-26 PROCEDURE — 1090F PRES/ABSN URINE INCON ASSESS: CPT | Performed by: INTERNAL MEDICINE

## 2023-04-26 PROCEDURE — 1036F TOBACCO NON-USER: CPT | Performed by: INTERNAL MEDICINE

## 2023-04-26 PROCEDURE — 1123F ACP DISCUSS/DSCN MKR DOCD: CPT | Performed by: INTERNAL MEDICINE

## 2023-04-26 PROCEDURE — G8427 DOCREV CUR MEDS BY ELIG CLIN: HCPCS | Performed by: INTERNAL MEDICINE

## 2023-04-26 PROCEDURE — 3078F DIAST BP <80 MM HG: CPT | Performed by: INTERNAL MEDICINE

## 2023-04-26 PROCEDURE — G8417 CALC BMI ABV UP PARAM F/U: HCPCS | Performed by: INTERNAL MEDICINE

## 2023-04-26 NOTE — PROGRESS NOTES
disease risk -given normal angiographic assessments and good control of modifiable risk factors would appear to be very low.

## 2023-05-04 RX ORDER — LEVOTHYROXINE SODIUM 88 UG/1
TABLET ORAL
Qty: 98 TABLET | Refills: 0 | Status: SHIPPED | OUTPATIENT
Start: 2023-05-04

## 2023-05-22 RX ORDER — ESOMEPRAZOLE MAGNESIUM 40 MG/1
CAPSULE, DELAYED RELEASE ORAL
Qty: 30 CAPSULE | Refills: 3 | Status: SHIPPED | OUTPATIENT
Start: 2023-05-22

## 2023-05-22 RX ORDER — PRAVASTATIN SODIUM 10 MG
TABLET ORAL
Qty: 30 TABLET | Refills: 3 | Status: SHIPPED | OUTPATIENT
Start: 2023-05-22

## 2023-05-22 RX ORDER — PROPRANOLOL HYDROCHLORIDE 120 MG/1
CAPSULE, EXTENDED RELEASE ORAL
Qty: 30 CAPSULE | Refills: 3 | Status: SHIPPED | OUTPATIENT
Start: 2023-05-22

## 2023-05-22 RX ORDER — SERTRALINE HYDROCHLORIDE 100 MG/1
TABLET, FILM COATED ORAL
Qty: 30 TABLET | Refills: 5 | Status: SHIPPED | OUTPATIENT
Start: 2023-05-22

## 2023-05-22 RX ORDER — CANDESARTAN 32 MG/1
TABLET ORAL
Qty: 30 TABLET | Refills: 3 | Status: SHIPPED | OUTPATIENT
Start: 2023-05-22

## 2023-05-22 RX ORDER — INDAPAMIDE 2.5 MG/1
TABLET, FILM COATED ORAL
Qty: 30 TABLET | Refills: 3 | Status: SHIPPED | OUTPATIENT
Start: 2023-05-22

## 2023-08-04 RX ORDER — LEVOTHYROXINE SODIUM 88 UG/1
TABLET ORAL
Qty: 98 TABLET | Refills: 0 | Status: SHIPPED | OUTPATIENT
Start: 2023-08-04

## 2023-08-08 NOTE — PROGRESS NOTES
"Zahra Paredes MD [facility] Breast follow up note    Referring Provider: No ref. provider found      Chief complaint   Chief Complaint   Patient presents with    Follow-up     6 month follow up         Subjective .     History of present illness:  Vanessa Kamara  is a 77 y.o. female who presents for yearly breast follow up.  She is status post left stereotactic biopsy for indeterminate calcifications.  Pathology demonstrated fibroadenomatoid changes.  She denies any breast, nipple, or skin changes.      History    The following portions of the patient's history were reviewed and updated as appropriate: allergies, current medications, past family history, past medical history, past social history, past surgical history, and problem list.    Objective     Vital Signs   /71   Pulse 69   Ht 160 cm (63\")   Wt 97.1 kg (214 lb)   LMP  (LMP Unknown)   SpO2 95%   BMI 37.91 kg/mý      Physical Exam:    General The patient is well-developed, well-nourished, and in no acute distress.  Breast  Nipples everted without expressible discharge.  No erythema, edema, induration, or dimpling.  No palpable masses.  No axillary adenopathy.      Imaging:  [facility] B screening mammogram 07/05/23:  benign          Assessment & Plan       Diagnoses and all orders for this visit:    1. Screening mammogram for high-risk patient (Primary)  -     Mammo Screening Bilateral With CAD; Future    2. Dense breast tissue on mammogram  -     Mammo Screening Bilateral With CAD; Future    3. Personal history of benign breast biopsy  -     Mammo Screening Bilateral With CAD; Future    4. Fibroadenosis of breast, left  -     Mammo Screening Bilateral With CAD; Future           An extensive review of patient intake forms, referring physician documents, laboratories, and imaging was performed in the medical decision making and surgical planning of this patient.     The patient will be scheduled for bilateral screening mammograms in one year followed by " clinical examination by her PCP.  She will notify her PCP with breast, nipple, or skin changes.      Zahra Paredes MD  08/10/23  07:58 CDT

## 2023-08-09 ENCOUNTER — OFFICE VISIT (OUTPATIENT)
Dept: SURGERY | Facility: CLINIC | Age: 78
End: 2023-08-09
Payer: MEDICARE

## 2023-08-09 VITALS
DIASTOLIC BLOOD PRESSURE: 71 MMHG | WEIGHT: 214 LBS | OXYGEN SATURATION: 95 % | BODY MASS INDEX: 37.92 KG/M2 | HEIGHT: 63 IN | HEART RATE: 69 BPM | SYSTOLIC BLOOD PRESSURE: 128 MMHG

## 2023-08-09 DIAGNOSIS — Z12.31 SCREENING MAMMOGRAM FOR HIGH-RISK PATIENT: Primary | ICD-10-CM

## 2023-08-09 DIAGNOSIS — R92.2 DENSE BREAST TISSUE ON MAMMOGRAM: ICD-10-CM

## 2023-08-09 DIAGNOSIS — N60.22 FIBROADENOSIS OF BREAST, LEFT: ICD-10-CM

## 2023-08-09 DIAGNOSIS — Z98.890 PERSONAL HISTORY OF BENIGN BREAST BIOPSY: ICD-10-CM

## 2023-08-09 PROCEDURE — 1159F MED LIST DOCD IN RCRD: CPT | Performed by: SPECIALIST

## 2023-08-09 PROCEDURE — 1160F RVW MEDS BY RX/DR IN RCRD: CPT | Performed by: SPECIALIST

## 2023-08-09 PROCEDURE — 99213 OFFICE O/P EST LOW 20 MIN: CPT | Performed by: SPECIALIST

## 2023-08-21 RX ORDER — MONTELUKAST SODIUM 4 MG/1
TABLET, CHEWABLE ORAL
Qty: 60 TABLET | Refills: 3 | Status: SHIPPED | OUTPATIENT
Start: 2023-08-21

## 2023-09-14 ENCOUNTER — OFFICE VISIT (OUTPATIENT)
Dept: PRIMARY CARE CLINIC | Age: 78
End: 2023-09-14
Payer: MEDICARE

## 2023-09-14 VITALS
WEIGHT: 221.4 LBS | DIASTOLIC BLOOD PRESSURE: 70 MMHG | OXYGEN SATURATION: 97 % | SYSTOLIC BLOOD PRESSURE: 124 MMHG | HEIGHT: 63 IN | HEART RATE: 57 BPM | TEMPERATURE: 97.5 F | BODY MASS INDEX: 39.23 KG/M2

## 2023-09-14 DIAGNOSIS — I10 ESSENTIAL HYPERTENSION: ICD-10-CM

## 2023-09-14 DIAGNOSIS — Z23 NEED FOR INFLUENZA VACCINATION: ICD-10-CM

## 2023-09-14 DIAGNOSIS — R53.1 WEAKNESS: ICD-10-CM

## 2023-09-14 DIAGNOSIS — Z00.00 MEDICARE ANNUAL WELLNESS VISIT, SUBSEQUENT: Primary | ICD-10-CM

## 2023-09-14 DIAGNOSIS — E78.2 MIXED HYPERLIPIDEMIA: ICD-10-CM

## 2023-09-14 DIAGNOSIS — E04.1 THYROID NODULE: ICD-10-CM

## 2023-09-14 LAB
ALBUMIN SERPL-MCNC: 4.6 G/DL (ref 3.5–5.2)
ALP SERPL-CCNC: 96 U/L (ref 35–104)
ALT SERPL-CCNC: 11 U/L (ref 5–33)
ANION GAP SERPL CALCULATED.3IONS-SCNC: 17 MMOL/L (ref 7–19)
AST SERPL-CCNC: 18 U/L (ref 5–32)
BASOPHILS # BLD: 0.1 K/UL (ref 0–0.2)
BASOPHILS NFR BLD: 0.9 % (ref 0–1)
BILIRUB SERPL-MCNC: 0.3 MG/DL (ref 0.2–1.2)
BUN SERPL-MCNC: 24 MG/DL (ref 8–23)
CALCIUM SERPL-MCNC: 10.1 MG/DL (ref 8.8–10.2)
CHLORIDE SERPL-SCNC: 101 MMOL/L (ref 98–111)
CHOLEST SERPL-MCNC: 175 MG/DL (ref 160–199)
CO2 SERPL-SCNC: 21 MMOL/L (ref 22–29)
CREAT SERPL-MCNC: 1.1 MG/DL (ref 0.5–0.9)
EOSINOPHIL # BLD: 0.2 K/UL (ref 0–0.6)
EOSINOPHIL NFR BLD: 3 % (ref 0–5)
ERYTHROCYTE [DISTWIDTH] IN BLOOD BY AUTOMATED COUNT: 15.1 % (ref 11.5–14.5)
GLUCOSE SERPL-MCNC: 100 MG/DL (ref 74–109)
HCT VFR BLD AUTO: 34.3 % (ref 37–47)
HDLC SERPL-MCNC: 70 MG/DL (ref 65–121)
HGB BLD-MCNC: 10.7 G/DL (ref 12–16)
IMM GRANULOCYTES # BLD: 0.1 K/UL
LDLC SERPL CALC-MCNC: 69 MG/DL
LYMPHOCYTES # BLD: 1 K/UL (ref 1.1–4.5)
LYMPHOCYTES NFR BLD: 14.4 % (ref 20–40)
MCH RBC QN AUTO: 27.3 PG (ref 27–31)
MCHC RBC AUTO-ENTMCNC: 31.2 G/DL (ref 33–37)
MCV RBC AUTO: 87.5 FL (ref 81–99)
MONOCYTES # BLD: 0.6 K/UL (ref 0–0.9)
MONOCYTES NFR BLD: 9.4 % (ref 0–10)
NEUTROPHILS # BLD: 4.8 K/UL (ref 1.5–7.5)
NEUTS SEG NFR BLD: 71.4 % (ref 50–65)
PLATELET # BLD AUTO: 289 K/UL (ref 130–400)
PMV BLD AUTO: 10.6 FL (ref 9.4–12.3)
POTASSIUM SERPL-SCNC: 4.8 MMOL/L (ref 3.5–5)
PROT SERPL-MCNC: 7.3 G/DL (ref 6.6–8.7)
RBC # BLD AUTO: 3.92 M/UL (ref 4.2–5.4)
SODIUM SERPL-SCNC: 139 MMOL/L (ref 136–145)
T4 FREE SERPL-MCNC: 1.8 NG/DL (ref 0.93–1.7)
TRIGL SERPL-MCNC: 181 MG/DL (ref 0–149)
TSH SERPL DL<=0.005 MIU/L-ACNC: 1.79 UIU/ML (ref 0.27–4.2)
WBC # BLD AUTO: 6.7 K/UL (ref 4.8–10.8)

## 2023-09-14 PROCEDURE — G0439 PPPS, SUBSEQ VISIT: HCPCS | Performed by: FAMILY MEDICINE

## 2023-09-14 PROCEDURE — 90694 VACC AIIV4 NO PRSRV 0.5ML IM: CPT | Performed by: FAMILY MEDICINE

## 2023-09-14 PROCEDURE — 1123F ACP DISCUSS/DSCN MKR DOCD: CPT | Performed by: FAMILY MEDICINE

## 2023-09-14 PROCEDURE — 3074F SYST BP LT 130 MM HG: CPT | Performed by: FAMILY MEDICINE

## 2023-09-14 PROCEDURE — G0008 ADMIN INFLUENZA VIRUS VAC: HCPCS | Performed by: FAMILY MEDICINE

## 2023-09-14 PROCEDURE — G8417 CALC BMI ABV UP PARAM F/U: HCPCS | Performed by: FAMILY MEDICINE

## 2023-09-14 PROCEDURE — G8427 DOCREV CUR MEDS BY ELIG CLIN: HCPCS | Performed by: FAMILY MEDICINE

## 2023-09-14 PROCEDURE — G8399 PT W/DXA RESULTS DOCUMENT: HCPCS | Performed by: FAMILY MEDICINE

## 2023-09-14 PROCEDURE — 3078F DIAST BP <80 MM HG: CPT | Performed by: FAMILY MEDICINE

## 2023-09-14 PROCEDURE — 99213 OFFICE O/P EST LOW 20 MIN: CPT | Performed by: FAMILY MEDICINE

## 2023-09-14 PROCEDURE — 1036F TOBACCO NON-USER: CPT | Performed by: FAMILY MEDICINE

## 2023-09-14 PROCEDURE — 1090F PRES/ABSN URINE INCON ASSESS: CPT | Performed by: FAMILY MEDICINE

## 2023-09-14 RX ORDER — ERYTHROMYCIN 5 MG/G
OINTMENT OPHTHALMIC
COMMUNITY
Start: 2023-07-14

## 2023-09-14 ASSESSMENT — PATIENT HEALTH QUESTIONNAIRE - PHQ9
SUM OF ALL RESPONSES TO PHQ9 QUESTIONS 1 & 2: 0
SUM OF ALL RESPONSES TO PHQ QUESTIONS 1-9: 0
2. FEELING DOWN, DEPRESSED OR HOPELESS: 0
SUM OF ALL RESPONSES TO PHQ QUESTIONS 1-9: 0
1. LITTLE INTEREST OR PLEASURE IN DOING THINGS: 0

## 2023-09-14 ASSESSMENT — LIFESTYLE VARIABLES
HOW MANY STANDARD DRINKS CONTAINING ALCOHOL DO YOU HAVE ON A TYPICAL DAY: PATIENT DOES NOT DRINK
HOW OFTEN DO YOU HAVE A DRINK CONTAINING ALCOHOL: NEVER

## 2023-09-14 NOTE — PROGRESS NOTES
Medicare Annual Wellness Visit    Sweetie Vicente is here for Medicare AWV (MAW. States she has recently had several procedures that she sees specialists for. She has also had a mammogram recently. She is fasting today. She is following OBGYN in regards to yearly pelvic exam/ pap if necessary. )    Assessment & Plan   Medicare annual wellness visit, subsequent  Need for influenza vaccination  -     Influenza, FLUAD, (age 72 y+), IM, Preservative Free, 0.5 mL  Mixed hyperlipidemia  Essential hypertension  Weakness    Flu shot was given in office today. Continue to follow with GYN regarding her routine Paps. Mammogram will be set up for next year since she had a normal one this year. Recommendations for Preventive Services Due: see orders and patient instructions/AVS.  Recommended screening schedule for the next 5-10 years is provided to the patient in written form: see Patient Instructions/AVS.     Return in 1 year (on 9/14/2024). Subjective   The following acute and/or chronic problems were also addressed today:  Patient is seen today for Medicare annual wellness. She states overall she is doing okay. She has a history of hypothyroidism. She is on Synthroid 88 mcg and is tolerating this well. She does have a history of hypertension. Blood pressure is well controlled in office today. She denies any chest pain or palpitations. She states that she is not having any issues with her blood pressure. She states that her Zoloft is working well for anxiety and depression. Again she feels like she is tolerating this well and denies any side effects to it. She is followed by GYN in [place]. She states that she is having her mammograms done at [facility].  She does have 1 done in July and it was normal and they recommended a 1 year repeat. Patient's complete Health Risk Assessment and screening values have been reviewed and are found in Flowsheets.  The following problems were reviewed today and where

## 2023-09-18 RX ORDER — PROPRANOLOL HYDROCHLORIDE 120 MG/1
CAPSULE, EXTENDED RELEASE ORAL
Qty: 30 CAPSULE | Refills: 3 | Status: SHIPPED | OUTPATIENT
Start: 2023-09-18

## 2023-09-18 RX ORDER — INDAPAMIDE 2.5 MG/1
TABLET ORAL
Qty: 30 TABLET | Refills: 3 | Status: SHIPPED | OUTPATIENT
Start: 2023-09-18

## 2023-09-18 RX ORDER — CANDESARTAN 32 MG/1
TABLET ORAL
Qty: 30 TABLET | Refills: 3 | Status: SHIPPED | OUTPATIENT
Start: 2023-09-18

## 2023-09-18 RX ORDER — ESOMEPRAZOLE MAGNESIUM 40 MG/1
CAPSULE, DELAYED RELEASE ORAL
Qty: 30 CAPSULE | Refills: 3 | Status: SHIPPED | OUTPATIENT
Start: 2023-09-18

## 2023-09-18 RX ORDER — PRAVASTATIN SODIUM 10 MG
TABLET ORAL
Qty: 30 TABLET | Refills: 3 | Status: SHIPPED | OUTPATIENT
Start: 2023-09-18

## 2023-09-22 ENCOUNTER — NURSE ONLY (OUTPATIENT)
Dept: PRIMARY CARE CLINIC | Age: 78
End: 2023-09-22

## 2023-09-22 DIAGNOSIS — D64.9 ANEMIA, UNSPECIFIED TYPE: Primary | ICD-10-CM

## 2023-09-22 LAB
HEMOCCULT STL QL: NEGATIVE

## 2023-10-03 ENCOUNTER — OFFICE VISIT (OUTPATIENT)
Dept: OBSTETRICS AND GYNECOLOGY | Facility: CLINIC | Age: 78
End: 2023-10-03
Payer: MEDICARE

## 2023-10-03 VITALS
BODY MASS INDEX: 38.62 KG/M2 | SYSTOLIC BLOOD PRESSURE: 134 MMHG | HEIGHT: 63 IN | DIASTOLIC BLOOD PRESSURE: 82 MMHG | WEIGHT: 218 LBS

## 2023-10-03 DIAGNOSIS — N89.8 VAGINAL ITCHING: ICD-10-CM

## 2023-10-03 DIAGNOSIS — N90.4 LICHEN SCLEROSUS OF FEMALE GENITALIA: Primary | ICD-10-CM

## 2023-10-03 RX ORDER — ALPRAZOLAM 0.5 MG/1
TABLET ORAL
COMMUNITY

## 2023-10-03 RX ORDER — PROPRANOLOL HYDROCHLORIDE 120 MG/1
1 CAPSULE, EXTENDED RELEASE ORAL DAILY
COMMUNITY
Start: 2023-09-18

## 2023-10-03 RX ORDER — ERYTHROMYCIN 5 MG/G
OINTMENT OPHTHALMIC
COMMUNITY
Start: 2023-07-14

## 2023-10-03 NOTE — PROGRESS NOTES
Chief Complaint   Patient presents with    Gynecologic Exam     Pt is here for a six month f/u on urinary incontinence, vaginal itching and lichen sclerosus.  Pt feels medication given at her last appointment have helped a lot.        History:  Vanessa Kamara is a 77 y.o. female who presents today for follow-up for evaluation of the above:    HPI    Patient is here today for f/u after starting topical treatment for lichen sclerosis and vaginal itching.   She reports symptoms have improved.         ROS:  Review of Systems   Constitutional: Negative.    HENT: Negative.     Eyes: Negative.    Respiratory: Negative.     Cardiovascular: Negative.    Gastrointestinal: Negative.    Endocrine: Negative.    Genitourinary: Negative.    Musculoskeletal: Negative.    Skin: Negative.    Neurological: Negative.    Psychiatric/Behavioral: Negative.       Ms. Kamara  reports that she has never smoked. She has never used smokeless tobacco. She reports that she does not drink alcohol and does not use drugs.      Current Outpatient Medications:     ALPRAZolam (XANAX) 0.5 MG tablet, Take 1 tablet 3 times a day by oral route., Disp: , Rfl:     aspirin 81 MG chewable tablet, Chew 1 tablet Daily., Disp: , Rfl:     Calcium Citrate-Vitamin D 500-10 MG-MCG chewable tablet, Chew., Disp: , Rfl:     candesartan (ATACAND) 32 MG tablet, , Disp: , Rfl:     cholecalciferol (VITAMIN D3) 1000 units tablet, Take 2 tablets by mouth Daily., Disp: , Rfl:     clobetasol (Temovate) 0.05 % cream, Apply 1 application topically to the appropriate area as directed 2 (Two) Times a Day., Disp: 60 g, Rfl: 3    colestipol (COLESTID) 1 g tablet, , Disp: , Rfl:     diphenoxylate-atropine (LOMOTIL) 2.5-0.025 MG per tablet, Take 1 tablet by mouth 4 (Four) Times a Day As Needed for Diarrhea., Disp: , Rfl:     Ergocalciferol 50 MCG (2000 UT) capsule, Take  by mouth., Disp: , Rfl:     esomeprazole (nexIUM) 40 MG capsule, Take 1 capsule by mouth Every Morning Before  "Breakfast., Disp: , Rfl:     indapamide (LOZOL) 2.5 MG tablet, Take 1 tablet by mouth Daily., Disp: , Rfl:     levothyroxine (SYNTHROID, LEVOTHROID) 88 MCG tablet, , Disp: , Rfl:     ondansetron (ZOFRAN) 4 MG tablet, , Disp: , Rfl:     pravastatin (PRAVACHOL) 10 MG tablet, Take 1 tablet by mouth Daily., Disp: , Rfl:     propranolol LA (INDERAL LA) 120 MG 24 hr capsule, Take 1 capsule by mouth Daily., Disp: , Rfl:     sertraline (ZOLOFT) 100 MG tablet, Take 1 tablet by mouth Daily., Disp: , Rfl:     erythromycin (ROMYCIN) 5 MG/GM ophthalmic ointment, , Disp: , Rfl:     propranolol XL (INNOPRAN XL) 120 MG 24 hr capsule, Take 1 capsule by mouth Daily. (Patient not taking: Reported on 10/3/2023), Disp: , Rfl:     Current Facility-Administered Medications:     lidocaine (XYLOCAINE) 1 % injection 10 mL, 10 mL, Subcutaneous, Once, Shun Joe MD    lidocaine 1% - EPINEPHrine 1:[id number] (XYLOCAINE W/EPI) 1 %-1:198996 injection 10 mL, 10 mL, Injection, Once, Shun Joe MD      OBJECTIVE:  /82   Ht 160 cm (63\")   Wt 98.9 kg (218 lb)   LMP  (LMP Unknown)   BMI 38.62 kg/m²    Physical Exam  Exam conducted with a chaperone present.   Constitutional:       Appearance: She is not ill-appearing.   Pulmonary:      Effort: No respiratory distress.   Genitourinary:     Labia:         Right: Lesion present.         Left: Lesion present.       Comments: External lesions much improved.   Neurological:      Mental Status: She is oriented to person, place, and time.   Psychiatric:         Behavior: Behavior normal.       Assessment/Plan    Diagnoses and all orders for this visit:    1. Lichen sclerosus of female genitalia (Primary)  Comments:  much improved    2. Vaginal itching    Continue current therapy.        An After Visit Summary was printed and given to the patient at discharge.  Return in about 1 year (around 10/3/2024) for Annual physical. Sooner if problems arise.          Lucy SOLIS. 10/3/2023 "   Electronically Signed

## 2023-10-11 ENCOUNTER — NURSE ONLY (OUTPATIENT)
Dept: PRIMARY CARE CLINIC | Age: 78
End: 2023-10-11

## 2023-10-11 DIAGNOSIS — D64.9 LOW HEMOGLOBIN: Primary | ICD-10-CM

## 2023-10-11 LAB
BASOPHILS # BLD: 0 K/UL (ref 0–0.2)
BASOPHILS NFR BLD: 0.7 % (ref 0–1)
EOSINOPHIL # BLD: 0.1 K/UL (ref 0–0.6)
EOSINOPHIL NFR BLD: 2.3 % (ref 0–5)
ERYTHROCYTE [DISTWIDTH] IN BLOOD BY AUTOMATED COUNT: 16.5 % (ref 11.5–14.5)
HCT VFR BLD AUTO: 33.8 % (ref 37–47)
HGB BLD-MCNC: 10.6 G/DL (ref 12–16)
IMM GRANULOCYTES # BLD: 0.1 K/UL
LYMPHOCYTES # BLD: 0.9 K/UL (ref 1.1–4.5)
LYMPHOCYTES NFR BLD: 15.9 % (ref 20–40)
MCH RBC QN AUTO: 27.3 PG (ref 27–31)
MCHC RBC AUTO-ENTMCNC: 31.4 G/DL (ref 33–37)
MCV RBC AUTO: 87.1 FL (ref 81–99)
MONOCYTES # BLD: 0.5 K/UL (ref 0–0.9)
MONOCYTES NFR BLD: 7.9 % (ref 0–10)
NEUTROPHILS # BLD: 4.1 K/UL (ref 1.5–7.5)
NEUTS SEG NFR BLD: 72.1 % (ref 50–65)
PLATELET # BLD AUTO: 273 K/UL (ref 130–400)
PMV BLD AUTO: 10.8 FL (ref 9.4–12.3)
RBC # BLD AUTO: 3.88 M/UL (ref 4.2–5.4)
WBC # BLD AUTO: 5.7 K/UL (ref 4.8–10.8)

## 2023-10-30 RX ORDER — LEVOTHYROXINE SODIUM 88 UG/1
TABLET ORAL
Qty: 98 TABLET | Refills: 0 | Status: SHIPPED | OUTPATIENT
Start: 2023-10-30

## 2023-10-31 ENCOUNTER — PATIENT MESSAGE (OUTPATIENT)
Dept: PRIMARY CARE CLINIC | Age: 78
End: 2023-10-31

## 2023-10-31 DIAGNOSIS — Z29.11 NEED FOR RSV IMMUNIZATION: Primary | ICD-10-CM

## 2023-10-31 NOTE — TELEPHONE ENCOUNTER
From: Clarissa Risk  To: Dr. 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 The pharmacist told us we needed a prescription from you in order to get the RSV vaccine. Is this correct? If so, would you please send a script for both Boubacar Rivera and I to get our RSV to [facility] please. Our son had RSV last year and he was extremely sick. Hope you are well. Thanks!

## 2023-11-20 RX ORDER — SERTRALINE HYDROCHLORIDE 100 MG/1
TABLET, FILM COATED ORAL
Qty: 30 TABLET | Refills: 5 | Status: SHIPPED | OUTPATIENT
Start: 2023-11-20

## 2023-11-30 ENCOUNTER — TELEPHONE (OUTPATIENT)
Dept: PRIMARY CARE CLINIC | Age: 78
End: 2023-11-30

## 2023-11-30 NOTE — TELEPHONE ENCOUNTER
Regarding: RSV Shot  Contact: [phone]  Please note that on both mine and [facility] records that we got our RSV shots on November 27, 2023.

## 2024-01-18 RX ORDER — PRAVASTATIN SODIUM 10 MG
TABLET ORAL
Qty: 30 TABLET | Refills: 3 | Status: SHIPPED | OUTPATIENT
Start: 2024-01-18

## 2024-01-18 RX ORDER — CANDESARTAN 32 MG/1
TABLET ORAL
Qty: 30 TABLET | Refills: 3 | Status: SHIPPED | OUTPATIENT
Start: 2024-01-18

## 2024-01-18 RX ORDER — ESOMEPRAZOLE MAGNESIUM 40 MG/1
CAPSULE, DELAYED RELEASE ORAL
Qty: 30 CAPSULE | Refills: 3 | Status: SHIPPED | OUTPATIENT
Start: 2024-01-18

## 2024-01-18 RX ORDER — PROPRANOLOL HYDROCHLORIDE 120 MG/1
CAPSULE, EXTENDED RELEASE ORAL
Qty: 30 CAPSULE | Refills: 3 | Status: SHIPPED | OUTPATIENT
Start: 2024-01-18

## 2024-01-18 RX ORDER — INDAPAMIDE 2.5 MG/1
TABLET ORAL
Qty: 30 TABLET | Refills: 3 | Status: SHIPPED | OUTPATIENT
Start: 2024-01-18

## 2024-01-29 RX ORDER — LEVOTHYROXINE SODIUM 88 UG/1
TABLET ORAL
Qty: 98 TABLET | Refills: 0 | Status: SHIPPED | OUTPATIENT
Start: 2024-01-29

## 2024-04-16 RX ORDER — MONTELUKAST SODIUM 4 MG/1
TABLET, CHEWABLE ORAL
Qty: 60 TABLET | Refills: 3 | Status: SHIPPED | OUTPATIENT
Start: 2024-04-16

## 2024-05-13 ENCOUNTER — PATIENT MESSAGE (OUTPATIENT)
Dept: PRIMARY CARE CLINIC | Age: 79
End: 2024-05-13

## 2024-05-13 NOTE — TELEPHONE ENCOUNTER
From: Linda Durham  To: Dr. Sharmila Harper  Sent: 5/13/2024 9:44 AM CDT  Subject: Persistent cough    I have had this annoying cough for over a month. I am pretty positive that it is allergies and the drainage is clear. Sometimes it just takes my breath away I cough so hard. I will go hours and no cough but then its back, and wakes me up in night. Tested neg for Covid this morning. Been taking Mucinex and Levocetirzine. Doesn’t really seem to help much. Suggestions?

## 2024-05-14 RX ORDER — BENZONATATE 100 MG/1
100 CAPSULE ORAL 3 TIMES DAILY PRN
Qty: 30 CAPSULE | Refills: 0 | Status: SHIPPED | OUTPATIENT
Start: 2024-05-14 | End: 2024-05-24

## 2024-05-14 RX ORDER — DEXTROMETHORPHAN HYDROBROMIDE AND PROMETHAZINE HYDROCHLORIDE 15; 6.25 MG/5ML; MG/5ML
5 SYRUP ORAL 4 TIMES DAILY PRN
Qty: 240 ML | Refills: 0 | Status: SHIPPED | OUTPATIENT
Start: 2024-05-14 | End: 2024-05-21

## 2024-05-14 RX ORDER — PREDNISONE 10 MG/1
TABLET ORAL
Qty: 21 TABLET | Refills: 0 | Status: SHIPPED | OUTPATIENT
Start: 2024-05-14

## 2024-05-17 RX ORDER — PRAVASTATIN SODIUM 10 MG
TABLET ORAL
Qty: 30 TABLET | Refills: 3 | Status: SHIPPED | OUTPATIENT
Start: 2024-05-17

## 2024-05-17 RX ORDER — INDAPAMIDE 2.5 MG/1
TABLET ORAL
Qty: 30 TABLET | Refills: 3 | Status: SHIPPED | OUTPATIENT
Start: 2024-05-17

## 2024-05-17 RX ORDER — SERTRALINE HYDROCHLORIDE 100 MG/1
100 TABLET, FILM COATED ORAL DAILY
Qty: 30 TABLET | Refills: 5 | Status: SHIPPED | OUTPATIENT
Start: 2024-05-17

## 2024-05-17 RX ORDER — CANDESARTAN 32 MG/1
TABLET ORAL
Qty: 30 TABLET | Refills: 3 | Status: SHIPPED | OUTPATIENT
Start: 2024-05-17

## 2024-05-17 RX ORDER — PROPRANOLOL HYDROCHLORIDE 120 MG/1
CAPSULE, EXTENDED RELEASE ORAL
Qty: 30 CAPSULE | Refills: 3 | Status: SHIPPED | OUTPATIENT
Start: 2024-05-17

## 2024-06-14 RX ORDER — LEVOTHYROXINE SODIUM 88 UG/1
TABLET ORAL
Qty: 98 TABLET | Refills: 0 | Status: SHIPPED | OUTPATIENT
Start: 2024-06-14

## 2024-07-01 ENCOUNTER — PATIENT MESSAGE (OUTPATIENT)
Dept: PRIMARY CARE CLINIC | Age: 79
End: 2024-07-01

## 2024-07-01 RX ORDER — TIZANIDINE 4 MG/1
4 TABLET ORAL NIGHTLY PRN
Qty: 30 TABLET | Refills: 0 | Status: SHIPPED | OUTPATIENT
Start: 2024-07-01

## 2024-07-08 SDOH — ECONOMIC STABILITY: FOOD INSECURITY: WITHIN THE PAST 12 MONTHS, THE FOOD YOU BOUGHT JUST DIDN'T LAST AND YOU DIDN'T HAVE MONEY TO GET MORE.: NEVER TRUE

## 2024-07-08 SDOH — ECONOMIC STABILITY: FOOD INSECURITY: WITHIN THE PAST 12 MONTHS, YOU WORRIED THAT YOUR FOOD WOULD RUN OUT BEFORE YOU GOT MONEY TO BUY MORE.: NEVER TRUE

## 2024-07-08 SDOH — ECONOMIC STABILITY: INCOME INSECURITY: HOW HARD IS IT FOR YOU TO PAY FOR THE VERY BASICS LIKE FOOD, HOUSING, MEDICAL CARE, AND HEATING?: NOT HARD AT ALL

## 2024-07-08 SDOH — ECONOMIC STABILITY: TRANSPORTATION INSECURITY
IN THE PAST 12 MONTHS, HAS LACK OF TRANSPORTATION KEPT YOU FROM MEETINGS, WORK, OR FROM GETTING THINGS NEEDED FOR DAILY LIVING?: NO

## 2024-07-08 ASSESSMENT — PATIENT HEALTH QUESTIONNAIRE - PHQ9
2. FEELING DOWN, DEPRESSED OR HOPELESS: NOT AT ALL
SUM OF ALL RESPONSES TO PHQ QUESTIONS 1-9: 0
SUM OF ALL RESPONSES TO PHQ9 QUESTIONS 1 & 2: 0
SUM OF ALL RESPONSES TO PHQ9 QUESTIONS 1 & 2: 0
SUM OF ALL RESPONSES TO PHQ QUESTIONS 1-9: 0
2. FEELING DOWN, DEPRESSED OR HOPELESS: NOT AT ALL
SUM OF ALL RESPONSES TO PHQ QUESTIONS 1-9: 0
1. LITTLE INTEREST OR PLEASURE IN DOING THINGS: NOT AT ALL
SUM OF ALL RESPONSES TO PHQ QUESTIONS 1-9: 0
1. LITTLE INTEREST OR PLEASURE IN DOING THINGS: NOT AT ALL

## 2024-07-10 ENCOUNTER — OFFICE VISIT (OUTPATIENT)
Dept: PRIMARY CARE CLINIC | Age: 79
End: 2024-07-10
Payer: MEDICARE

## 2024-07-10 VITALS
WEIGHT: 226 LBS | SYSTOLIC BLOOD PRESSURE: 136 MMHG | DIASTOLIC BLOOD PRESSURE: 84 MMHG | BODY MASS INDEX: 40.04 KG/M2 | HEART RATE: 68 BPM | RESPIRATION RATE: 16 BRPM | HEIGHT: 63 IN | OXYGEN SATURATION: 97 % | TEMPERATURE: 98.4 F

## 2024-07-10 DIAGNOSIS — S76.212D INGUINAL STRAIN, LEFT, SUBSEQUENT ENCOUNTER: ICD-10-CM

## 2024-07-10 DIAGNOSIS — S16.1XXA STRAIN OF NECK MUSCLE, INITIAL ENCOUNTER: Primary | ICD-10-CM

## 2024-07-10 PROCEDURE — 3079F DIAST BP 80-89 MM HG: CPT | Performed by: FAMILY MEDICINE

## 2024-07-10 PROCEDURE — 99214 OFFICE O/P EST MOD 30 MIN: CPT | Performed by: FAMILY MEDICINE

## 2024-07-10 PROCEDURE — 1090F PRES/ABSN URINE INCON ASSESS: CPT | Performed by: FAMILY MEDICINE

## 2024-07-10 PROCEDURE — G8427 DOCREV CUR MEDS BY ELIG CLIN: HCPCS | Performed by: FAMILY MEDICINE

## 2024-07-10 PROCEDURE — 3075F SYST BP GE 130 - 139MM HG: CPT | Performed by: FAMILY MEDICINE

## 2024-07-10 PROCEDURE — G8399 PT W/DXA RESULTS DOCUMENT: HCPCS | Performed by: FAMILY MEDICINE

## 2024-07-10 PROCEDURE — G8417 CALC BMI ABV UP PARAM F/U: HCPCS | Performed by: FAMILY MEDICINE

## 2024-07-10 PROCEDURE — 1036F TOBACCO NON-USER: CPT | Performed by: FAMILY MEDICINE

## 2024-07-10 PROCEDURE — 1123F ACP DISCUSS/DSCN MKR DOCD: CPT | Performed by: FAMILY MEDICINE

## 2024-07-10 RX ORDER — PANTOPRAZOLE SODIUM 40 MG/1
40 TABLET, DELAYED RELEASE ORAL DAILY
COMMUNITY
Start: 2024-06-28

## 2024-07-10 RX ORDER — METHOCARBAMOL 750 MG/1
750 TABLET, FILM COATED ORAL 4 TIMES DAILY
Qty: 40 TABLET | Refills: 0 | Status: SHIPPED | OUTPATIENT
Start: 2024-07-10 | End: 2024-07-20

## 2024-07-10 ASSESSMENT — ENCOUNTER SYMPTOMS
EYE DISCHARGE: 0
DIARRHEA: 0
WHEEZING: 0
BACK PAIN: 0
COLOR CHANGE: 0
VOMITING: 0
COUGH: 0
ABDOMINAL PAIN: 0
NAUSEA: 0

## 2024-07-10 NOTE — PROGRESS NOTES
SUBJECTIVE:    Patient ID: Linda Durham is a 78 y.o. female.    HPI:   Patient is seen today for a follow-up after she has had a groin strain for the last couple of months.  She states that she was seen at orthopedics and they told her to rest did not do much and that it should heal within 6 to 8 weeks.  She states that she has been resting and not doing a whole lot she states that it really has gotten better but is not completely gone.  She states that she has not had any injury to this area that she can recall.  She states that she has not had any bruising or redness.  She states that the pain did start in the front part of the groin initially and now is located more on the lateral aspect of the hip.  She has not had any recent falls.  She denies any bruising.    She states that she also has developed a left neck strain in the last couple of weeks.  States that she got a new vacuum  and was using this for a long.  And states that the next morning she woke up and had the \"crick in her neck\".  She states that she does have some muscle relaxers that she takes at bedtime but she states that these make her sleepy.  She has not tried taking anything during the day.  She states that it just is not getting a lot better and she is wearing a neck brace today in office.  She states that it does get better but then it comes back and will spasm again.    Past Medical History:   Diagnosis Date    Carpal tunnel syndrome, right     HIGH CHOLESTEROL     Hypertension     Hypothyroidism     Osteoporosis       Current Outpatient Medications on File Prior to Visit   Medication Sig Dispense Refill    pantoprazole (PROTONIX) 40 MG tablet Take 1 tablet by mouth daily      tiZANidine (ZANAFLEX) 4 MG tablet Take 1 tablet by mouth nightly as needed (muscle strain) 30 tablet 0    levothyroxine (SYNTHROID) 88 MCG tablet TAKE ONE TABLET BY MOUTH EVERY DAY PLUS AN EXTRA ONE-HALF TABLET ON SUNDAYS 98 tablet 0    pravastatin (PRAVACHOL)

## 2024-08-05 ENCOUNTER — OFFICE VISIT (OUTPATIENT)
Dept: PRIMARY CARE CLINIC | Age: 79
End: 2024-08-05
Payer: MEDICARE

## 2024-08-05 VITALS
BODY MASS INDEX: 39.51 KG/M2 | HEIGHT: 63 IN | DIASTOLIC BLOOD PRESSURE: 82 MMHG | HEART RATE: 66 BPM | OXYGEN SATURATION: 99 % | SYSTOLIC BLOOD PRESSURE: 134 MMHG | TEMPERATURE: 97.8 F | RESPIRATION RATE: 16 BRPM | WEIGHT: 223 LBS

## 2024-08-05 DIAGNOSIS — S16.1XXA STRAIN OF NECK MUSCLE, INITIAL ENCOUNTER: Primary | ICD-10-CM

## 2024-08-05 DIAGNOSIS — R05.2 SUBACUTE COUGH: ICD-10-CM

## 2024-08-05 PROCEDURE — 1123F ACP DISCUSS/DSCN MKR DOCD: CPT | Performed by: FAMILY MEDICINE

## 2024-08-05 PROCEDURE — G8399 PT W/DXA RESULTS DOCUMENT: HCPCS | Performed by: FAMILY MEDICINE

## 2024-08-05 PROCEDURE — 3079F DIAST BP 80-89 MM HG: CPT | Performed by: FAMILY MEDICINE

## 2024-08-05 PROCEDURE — 1036F TOBACCO NON-USER: CPT | Performed by: FAMILY MEDICINE

## 2024-08-05 PROCEDURE — 1090F PRES/ABSN URINE INCON ASSESS: CPT | Performed by: FAMILY MEDICINE

## 2024-08-05 PROCEDURE — 99214 OFFICE O/P EST MOD 30 MIN: CPT | Performed by: FAMILY MEDICINE

## 2024-08-05 PROCEDURE — 3075F SYST BP GE 130 - 139MM HG: CPT | Performed by: FAMILY MEDICINE

## 2024-08-05 PROCEDURE — G8427 DOCREV CUR MEDS BY ELIG CLIN: HCPCS | Performed by: FAMILY MEDICINE

## 2024-08-05 PROCEDURE — G8417 CALC BMI ABV UP PARAM F/U: HCPCS | Performed by: FAMILY MEDICINE

## 2024-08-05 RX ORDER — CELECOXIB 100 MG/1
100 CAPSULE ORAL 2 TIMES DAILY
COMMUNITY
Start: 2024-07-25

## 2024-08-05 RX ORDER — METHOCARBAMOL 750 MG/1
750 TABLET, FILM COATED ORAL 4 TIMES DAILY
Qty: 40 TABLET | Refills: 0 | Status: SHIPPED | OUTPATIENT
Start: 2024-08-05 | End: 2024-08-15

## 2024-08-05 ASSESSMENT — ENCOUNTER SYMPTOMS
VOMITING: 0
EYE DISCHARGE: 0
DIARRHEA: 0
COUGH: 1
BACK PAIN: 0
NAUSEA: 0
COLOR CHANGE: 0
WHEEZING: 0
ABDOMINAL PAIN: 0

## 2024-08-05 NOTE — PROGRESS NOTES
EVERY DAY 30 capsule 3    indapamide (LOZOL) 2.5 MG tablet TAKE ONE TABLET BY MOUTH EVERY DAY 30 tablet 3    candesartan (ATACAND) 32 MG tablet TAKE ONE TABLET BY MOUTH EVERY DAY 30 tablet 3    sertraline (ZOLOFT) 100 MG tablet TAKE ONE TABLET BY MOUTH ONCE DAILY 30 tablet 5    colestipol (COLESTID) 1 g tablet TAKE ONE TABLET BY MOUTH TWICE A DAY 60 tablet 3    mupirocin (BACTROBAN) 2 % ointment Apply topically 2 times daily Apply topically 3 times daily. 30 g 0    triamcinolone (KENALOG) 0.1 % cream APPLY TOPICALLY TWO TIMES A DAY FOR 7 DAYS 60 g 0    nystatin (MYCOSTATIN) 835694 UNIT/GM ointment Apply topically 2 times daily. 60 g 0    Calcium-Vitamin D-Vitamin K (VIACTIV CALCIUM PLUS D) 650-12.5-40 MG-MCG-MCG CHEW Take by mouth Viactiv      aspirin 81 MG tablet Take 1 tablet by mouth daily      celecoxib (CELEBREX) 100 MG capsule Take 1 capsule by mouth 2 times daily (Patient not taking: Reported on 8/5/2024)      erythromycin (ROMYCIN) 5 MG/GM ophthalmic ointment  (Patient not taking: Reported on 8/5/2024)      Magic Mouthwash (MIRACLE MOUTHWASH) Swish and spit 5 mLs 4 times daily as needed for Irritation (Patient not taking: Reported on 7/10/2024) 240 mL 1     No current facility-administered medications on file prior to visit.     Allergies   Allergen Reactions    Codeine Shortness Of Breath     Unknown    Hydrocodone-Acetaminophen Other (See Comments)       Review of Systems   Constitutional:  Negative for activity change, appetite change and fever.   HENT:  Negative for congestion and nosebleeds.    Eyes:  Negative for discharge.   Respiratory:  Positive for cough. Negative for wheezing.    Cardiovascular:  Negative for chest pain and leg swelling.   Gastrointestinal:  Negative for abdominal pain, diarrhea, nausea and vomiting.   Genitourinary:  Negative for difficulty urinating, frequency and urgency.   Musculoskeletal:  Positive for neck pain. Negative for back pain and gait problem.   Skin:  Negative for

## 2024-08-15 RX ORDER — MONTELUKAST SODIUM 4 MG/1
TABLET, CHEWABLE ORAL
Qty: 60 TABLET | Refills: 3 | Status: SHIPPED | OUTPATIENT
Start: 2024-08-15

## 2024-08-28 RX ORDER — METHOCARBAMOL 750 MG/1
750 TABLET, FILM COATED ORAL 4 TIMES DAILY
Qty: 40 TABLET | Refills: 0 | Status: SHIPPED | OUTPATIENT
Start: 2024-08-28 | End: 2024-09-07

## 2024-09-13 SDOH — HEALTH STABILITY: PHYSICAL HEALTH: ON AVERAGE, HOW MANY DAYS PER WEEK DO YOU ENGAGE IN MODERATE TO STRENUOUS EXERCISE (LIKE A BRISK WALK)?: 3 DAYS

## 2024-09-13 SDOH — HEALTH STABILITY: PHYSICAL HEALTH: ON AVERAGE, HOW MANY MINUTES DO YOU ENGAGE IN EXERCISE AT THIS LEVEL?: 10 MIN

## 2024-09-13 ASSESSMENT — PATIENT HEALTH QUESTIONNAIRE - PHQ9
SUM OF ALL RESPONSES TO PHQ QUESTIONS 1-9: 0
SUM OF ALL RESPONSES TO PHQ9 QUESTIONS 1 & 2: 0
2. FEELING DOWN, DEPRESSED OR HOPELESS: NOT AT ALL
SUM OF ALL RESPONSES TO PHQ QUESTIONS 1-9: 0
1. LITTLE INTEREST OR PLEASURE IN DOING THINGS: NOT AT ALL

## 2024-09-13 ASSESSMENT — LIFESTYLE VARIABLES
HOW MANY STANDARD DRINKS CONTAINING ALCOHOL DO YOU HAVE ON A TYPICAL DAY: PATIENT DOES NOT DRINK
HOW OFTEN DO YOU HAVE A DRINK CONTAINING ALCOHOL: 1
HOW OFTEN DO YOU HAVE SIX OR MORE DRINKS ON ONE OCCASION: 1
HOW OFTEN DO YOU HAVE A DRINK CONTAINING ALCOHOL: NEVER
HOW MANY STANDARD DRINKS CONTAINING ALCOHOL DO YOU HAVE ON A TYPICAL DAY: 0

## 2024-09-16 ENCOUNTER — OFFICE VISIT (OUTPATIENT)
Dept: PRIMARY CARE CLINIC | Age: 79
End: 2024-09-16
Payer: MEDICARE

## 2024-09-16 VITALS
HEIGHT: 63 IN | HEART RATE: 64 BPM | WEIGHT: 223 LBS | OXYGEN SATURATION: 96 % | RESPIRATION RATE: 16 BRPM | TEMPERATURE: 98.2 F | SYSTOLIC BLOOD PRESSURE: 130 MMHG | BODY MASS INDEX: 39.51 KG/M2 | DIASTOLIC BLOOD PRESSURE: 82 MMHG

## 2024-09-16 DIAGNOSIS — E03.9 ACQUIRED HYPOTHYROIDISM: ICD-10-CM

## 2024-09-16 DIAGNOSIS — Z00.00 MEDICARE ANNUAL WELLNESS VISIT, SUBSEQUENT: Primary | ICD-10-CM

## 2024-09-16 DIAGNOSIS — Z23 NEED FOR INFLUENZA VACCINATION: ICD-10-CM

## 2024-09-16 DIAGNOSIS — E78.2 MIXED HYPERLIPIDEMIA: ICD-10-CM

## 2024-09-16 DIAGNOSIS — E04.1 THYROID NODULE: ICD-10-CM

## 2024-09-16 DIAGNOSIS — I10 ESSENTIAL HYPERTENSION: ICD-10-CM

## 2024-09-16 LAB
ALBUMIN SERPL-MCNC: 4.4 G/DL (ref 3.5–5.2)
ALP SERPL-CCNC: 89 U/L (ref 35–104)
ALT SERPL-CCNC: 9 U/L (ref 5–33)
ANION GAP SERPL CALCULATED.3IONS-SCNC: 12 MMOL/L (ref 7–19)
AST SERPL-CCNC: 14 U/L (ref 5–32)
BASOPHILS # BLD: 0.1 K/UL (ref 0–0.2)
BASOPHILS NFR BLD: 0.7 % (ref 0–1)
BILIRUB SERPL-MCNC: 0.4 MG/DL (ref 0.2–1.2)
BUN SERPL-MCNC: 30 MG/DL (ref 8–23)
CALCIUM SERPL-MCNC: 9.5 MG/DL (ref 8.8–10.2)
CHLORIDE SERPL-SCNC: 99 MMOL/L (ref 98–111)
CHOLEST SERPL-MCNC: 184 MG/DL (ref 0–199)
CO2 SERPL-SCNC: 25 MMOL/L (ref 22–29)
CREAT SERPL-MCNC: 1 MG/DL (ref 0.5–0.9)
EOSINOPHIL # BLD: 0.1 K/UL (ref 0–0.6)
EOSINOPHIL NFR BLD: 1.4 % (ref 0–5)
ERYTHROCYTE [DISTWIDTH] IN BLOOD BY AUTOMATED COUNT: 12.4 % (ref 11.5–14.5)
GLUCOSE SERPL-MCNC: 98 MG/DL (ref 70–99)
HCT VFR BLD AUTO: 39.8 % (ref 37–47)
HDLC SERPL-MCNC: 70 MG/DL (ref 40–60)
HGB BLD-MCNC: 12.7 G/DL (ref 12–16)
IMM GRANULOCYTES # BLD: 0.1 K/UL
LDLC SERPL CALC-MCNC: 76 MG/DL
LYMPHOCYTES # BLD: 1.3 K/UL (ref 1.1–4.5)
LYMPHOCYTES NFR BLD: 13.7 % (ref 20–40)
MCH RBC QN AUTO: 29.6 PG (ref 27–31)
MCHC RBC AUTO-ENTMCNC: 31.9 G/DL (ref 33–37)
MCV RBC AUTO: 92.8 FL (ref 81–99)
MONOCYTES # BLD: 0.8 K/UL (ref 0–0.9)
MONOCYTES NFR BLD: 8.3 % (ref 0–10)
NEUTROPHILS # BLD: 6.9 K/UL (ref 1.5–7.5)
NEUTS SEG NFR BLD: 74.5 % (ref 50–65)
PLATELET # BLD AUTO: 268 K/UL (ref 130–400)
PMV BLD AUTO: 10 FL (ref 9.4–12.3)
POTASSIUM SERPL-SCNC: 4.5 MMOL/L (ref 3.5–5)
PROT SERPL-MCNC: 6.7 G/DL (ref 6.4–8.3)
RBC # BLD AUTO: 4.29 M/UL (ref 4.2–5.4)
SODIUM SERPL-SCNC: 136 MMOL/L (ref 136–145)
T4 FREE SERPL-MCNC: 1.65 NG/DL (ref 0.93–1.7)
TRIGL SERPL-MCNC: 189 MG/DL (ref 0–149)
TSH SERPL DL<=0.005 MIU/L-ACNC: 1.98 UIU/ML (ref 0.27–4.2)
WBC # BLD AUTO: 9.2 K/UL (ref 4.8–10.8)

## 2024-09-16 PROCEDURE — 3075F SYST BP GE 130 - 139MM HG: CPT | Performed by: FAMILY MEDICINE

## 2024-09-16 PROCEDURE — 1036F TOBACCO NON-USER: CPT | Performed by: FAMILY MEDICINE

## 2024-09-16 PROCEDURE — 3079F DIAST BP 80-89 MM HG: CPT | Performed by: FAMILY MEDICINE

## 2024-09-16 PROCEDURE — 1090F PRES/ABSN URINE INCON ASSESS: CPT | Performed by: FAMILY MEDICINE

## 2024-09-16 PROCEDURE — G8417 CALC BMI ABV UP PARAM F/U: HCPCS | Performed by: FAMILY MEDICINE

## 2024-09-16 PROCEDURE — G8399 PT W/DXA RESULTS DOCUMENT: HCPCS | Performed by: FAMILY MEDICINE

## 2024-09-16 PROCEDURE — 99214 OFFICE O/P EST MOD 30 MIN: CPT | Performed by: FAMILY MEDICINE

## 2024-09-16 PROCEDURE — G8427 DOCREV CUR MEDS BY ELIG CLIN: HCPCS | Performed by: FAMILY MEDICINE

## 2024-09-16 PROCEDURE — G0439 PPPS, SUBSEQ VISIT: HCPCS | Performed by: FAMILY MEDICINE

## 2024-09-16 PROCEDURE — 1123F ACP DISCUSS/DSCN MKR DOCD: CPT | Performed by: FAMILY MEDICINE

## 2024-09-16 RX ORDER — LEVOTHYROXINE SODIUM 88 UG/1
TABLET ORAL
Qty: 98 TABLET | Refills: 2 | Status: SHIPPED | OUTPATIENT
Start: 2024-09-16

## 2024-09-16 RX ORDER — PRAVASTATIN SODIUM 10 MG
TABLET ORAL
Qty: 30 TABLET | Refills: 3 | Status: SHIPPED | OUTPATIENT
Start: 2024-09-16

## 2024-09-16 RX ORDER — PROPRANOLOL HYDROCHLORIDE 120 MG/1
CAPSULE, EXTENDED RELEASE ORAL
Qty: 30 CAPSULE | Refills: 3 | Status: SHIPPED | OUTPATIENT
Start: 2024-09-16

## 2024-09-16 RX ORDER — CANDESARTAN 32 MG/1
TABLET ORAL
Qty: 30 TABLET | Refills: 3 | Status: SHIPPED | OUTPATIENT
Start: 2024-09-16

## 2024-09-16 RX ORDER — INDAPAMIDE 2.5 MG/1
TABLET ORAL
Qty: 30 TABLET | Refills: 3 | Status: SHIPPED | OUTPATIENT
Start: 2024-09-16

## 2024-11-12 RX ORDER — SERTRALINE HYDROCHLORIDE 100 MG/1
100 TABLET, FILM COATED ORAL DAILY
Qty: 30 TABLET | Refills: 5 | Status: SHIPPED | OUTPATIENT
Start: 2024-11-12

## 2024-12-19 ENCOUNTER — OFFICE VISIT (OUTPATIENT)
Age: 79
End: 2024-12-19

## 2024-12-19 VITALS — RESPIRATION RATE: 20 BRPM | WEIGHT: 223 LBS | BODY MASS INDEX: 39.51 KG/M2 | HEIGHT: 63 IN

## 2024-12-19 DIAGNOSIS — M17.0 BILATERAL PRIMARY OSTEOARTHRITIS OF KNEE: Primary | ICD-10-CM

## 2024-12-19 RX ORDER — BETAMETHASONE SODIUM PHOSPHATE AND BETAMETHASONE ACETATE 3; 3 MG/ML; MG/ML
6 INJECTION, SUSPENSION INTRA-ARTICULAR; INTRALESIONAL; INTRAMUSCULAR; SOFT TISSUE ONCE
Status: COMPLETED | OUTPATIENT
Start: 2024-12-19 | End: 2024-12-19

## 2024-12-19 RX ORDER — BUPIVACAINE HYDROCHLORIDE 5 MG/ML
3 INJECTION, SOLUTION EPIDURAL; INTRACAUDAL ONCE
Status: COMPLETED | OUTPATIENT
Start: 2024-12-19 | End: 2024-12-19

## 2024-12-19 RX ORDER — LIDOCAINE HYDROCHLORIDE 10 MG/ML
1 INJECTION, SOLUTION INFILTRATION; PERINEURAL ONCE
Status: COMPLETED | OUTPATIENT
Start: 2024-12-19 | End: 2024-12-19

## 2024-12-19 RX ADMIN — BETAMETHASONE SODIUM PHOSPHATE AND BETAMETHASONE ACETATE 6 MG: 3; 3 INJECTION, SUSPENSION INTRA-ARTICULAR; INTRALESIONAL; INTRAMUSCULAR; SOFT TISSUE at 10:34

## 2024-12-19 RX ADMIN — LIDOCAINE HYDROCHLORIDE 1 ML: 10 INJECTION, SOLUTION INFILTRATION; PERINEURAL at 10:32

## 2024-12-19 RX ADMIN — LIDOCAINE HYDROCHLORIDE 1 ML: 10 INJECTION, SOLUTION INFILTRATION; PERINEURAL at 10:30

## 2024-12-19 RX ADMIN — BUPIVACAINE HYDROCHLORIDE 15 MG: 5 INJECTION, SOLUTION EPIDURAL; INTRACAUDAL at 10:33

## 2024-12-19 RX ADMIN — BETAMETHASONE SODIUM PHOSPHATE AND BETAMETHASONE ACETATE 6 MG: 3; 3 INJECTION, SUSPENSION INTRA-ARTICULAR; INTRALESIONAL; INTRAMUSCULAR; SOFT TISSUE at 10:35

## 2024-12-19 RX ADMIN — BUPIVACAINE HYDROCHLORIDE 15 MG: 5 INJECTION, SOLUTION EPIDURAL; INTRACAUDAL at 10:34

## 2024-12-19 NOTE — PROGRESS NOTES
Orthopaedic Clinic Note - Established Patient    NAME:  Linda Durham   : 1945  MRN: [id number]      2024      CHIEF COMPLAINT: Increasing bilateral knee pain     HISTORY OF PRESENT ILLNESS: This is a pleasant 78-year-old comes in with increased plaints of bilateral knee pain.  Patient had a previous ORIF right distal femur fracture in the distant past.  No recent injury or trauma.  Patient reports constant pain, moderate to severe, achy throbbing.  She has had injections in the past.  She is here to discuss further treatment options.      Past Medical History:        Diagnosis Date    Carpal tunnel syndrome, right     HIGH CHOLESTEROL     Hypertension     Hypothyroidism     Osteoarthritis     Osteoporosis        Past Surgical History:        Procedure Laterality Date    BREAST BIOPSY Right     BREAST LUMPECTOMY      2 times    CARDIAC CATHETERIZATION      CARDIAC CATHETERIZATION      CHOLECYSTECTOMY  2012    COLECTOMY          COLONOSCOPY      normal, due 10 years    CYST REMOVAL      Cyst removed from ring finger on her right hand     FRACTURE SURGERY  2019    HAND SURGERY      reconstruction, right hand    HAND SURGERY Right 2020    EXCISION OF MASS FROM RING FINGER performed by Trev Doe MD at [facility] OR    HYSTERECTOMY (CERVIX STATUS UNKNOWN)      Total ovaries gone    HYSTERECTOMY, VAGINAL      LEG SURGERY      Broken leg    TUBAL LIGATION      UPPER GASTROINTESTINAL ENDOSCOPY      normal    UPPER GASTROINTESTINAL ENDOSCOPY  2023       Current Medications:   Prior to Admission medications    Medication Sig Start Date End Date Taking? Authorizing Provider   sertraline (ZOLOFT) 100 MG tablet TAKE ONE TABLET BY MOUTH EVERY DAY 24  Yes Sharmila Harper MD   candesartan (ATACAND) 32 MG tablet TAKE ONE TABLET BY MOUTH EVERY DAY 24  Yes Sharmila Harper MD   pravastatin (PRAVACHOL) 10 MG tablet TAKE ONE TABLET BY MOUTH EVERY DAY

## 2025-01-11 SDOH — ECONOMIC STABILITY: FOOD INSECURITY: WITHIN THE PAST 12 MONTHS, YOU WORRIED THAT YOUR FOOD WOULD RUN OUT BEFORE YOU GOT MONEY TO BUY MORE.: NEVER TRUE

## 2025-01-11 SDOH — ECONOMIC STABILITY: FOOD INSECURITY: WITHIN THE PAST 12 MONTHS, THE FOOD YOU BOUGHT JUST DIDN'T LAST AND YOU DIDN'T HAVE MONEY TO GET MORE.: NEVER TRUE

## 2025-01-11 SDOH — ECONOMIC STABILITY: INCOME INSECURITY: IN THE LAST 12 MONTHS, WAS THERE A TIME WHEN YOU WERE NOT ABLE TO PAY THE MORTGAGE OR RENT ON TIME?: NO

## 2025-01-11 SDOH — ECONOMIC STABILITY: TRANSPORTATION INSECURITY
IN THE PAST 12 MONTHS, HAS THE LACK OF TRANSPORTATION KEPT YOU FROM MEDICAL APPOINTMENTS OR FROM GETTING MEDICATIONS?: NO

## 2025-01-13 ENCOUNTER — OFFICE VISIT (OUTPATIENT)
Dept: PRIMARY CARE CLINIC | Age: 80
End: 2025-01-13
Payer: MEDICARE

## 2025-01-13 VITALS
BODY MASS INDEX: 39.87 KG/M2 | DIASTOLIC BLOOD PRESSURE: 80 MMHG | HEART RATE: 64 BPM | RESPIRATION RATE: 18 BRPM | HEIGHT: 63 IN | OXYGEN SATURATION: 97 % | WEIGHT: 225 LBS | TEMPERATURE: 97.1 F | SYSTOLIC BLOOD PRESSURE: 124 MMHG

## 2025-01-13 DIAGNOSIS — K21.9 GASTROESOPHAGEAL REFLUX DISEASE, UNSPECIFIED WHETHER ESOPHAGITIS PRESENT: ICD-10-CM

## 2025-01-13 DIAGNOSIS — G89.29 CHRONIC RIGHT SHOULDER PAIN: Primary | ICD-10-CM

## 2025-01-13 DIAGNOSIS — M19.019 AC JOINT ARTHROPATHY: ICD-10-CM

## 2025-01-13 DIAGNOSIS — M25.511 CHRONIC RIGHT SHOULDER PAIN: Primary | ICD-10-CM

## 2025-01-13 PROCEDURE — 99214 OFFICE O/P EST MOD 30 MIN: CPT | Performed by: FAMILY MEDICINE

## 2025-01-13 PROCEDURE — 1090F PRES/ABSN URINE INCON ASSESS: CPT | Performed by: FAMILY MEDICINE

## 2025-01-13 PROCEDURE — 1123F ACP DISCUSS/DSCN MKR DOCD: CPT | Performed by: FAMILY MEDICINE

## 2025-01-13 PROCEDURE — 1159F MED LIST DOCD IN RCRD: CPT | Performed by: FAMILY MEDICINE

## 2025-01-13 PROCEDURE — G8399 PT W/DXA RESULTS DOCUMENT: HCPCS | Performed by: FAMILY MEDICINE

## 2025-01-13 PROCEDURE — 3079F DIAST BP 80-89 MM HG: CPT | Performed by: FAMILY MEDICINE

## 2025-01-13 PROCEDURE — 3074F SYST BP LT 130 MM HG: CPT | Performed by: FAMILY MEDICINE

## 2025-01-13 PROCEDURE — G8427 DOCREV CUR MEDS BY ELIG CLIN: HCPCS | Performed by: FAMILY MEDICINE

## 2025-01-13 PROCEDURE — G8417 CALC BMI ABV UP PARAM F/U: HCPCS | Performed by: FAMILY MEDICINE

## 2025-01-13 PROCEDURE — 1036F TOBACCO NON-USER: CPT | Performed by: FAMILY MEDICINE

## 2025-01-13 RX ORDER — PRAVASTATIN SODIUM 10 MG
TABLET ORAL
Qty: 30 TABLET | Refills: 3 | Status: SHIPPED | OUTPATIENT
Start: 2025-01-13

## 2025-01-13 RX ORDER — ESOMEPRAZOLE MAGNESIUM 40 MG/1
40 CAPSULE, DELAYED RELEASE ORAL
Qty: 30 CAPSULE | Refills: 5 | Status: SHIPPED | OUTPATIENT
Start: 2025-01-13 | End: 2025-07-12

## 2025-01-13 RX ORDER — CANDESARTAN 32 MG/1
TABLET ORAL
Qty: 30 TABLET | Refills: 3 | Status: SHIPPED | OUTPATIENT
Start: 2025-01-13

## 2025-01-13 RX ORDER — ESOMEPRAZOLE MAGNESIUM 40 MG/1
CAPSULE, DELAYED RELEASE ORAL
COMMUNITY
Start: 2024-12-13 | End: 2025-01-13 | Stop reason: SDUPTHER

## 2025-01-13 RX ORDER — INDAPAMIDE 2.5 MG/1
TABLET ORAL
Qty: 30 TABLET | Refills: 3 | Status: SHIPPED | OUTPATIENT
Start: 2025-01-13

## 2025-01-13 RX ORDER — PROPRANOLOL HYDROCHLORIDE 120 MG/1
CAPSULE, EXTENDED RELEASE ORAL
Qty: 30 CAPSULE | Refills: 3 | Status: SHIPPED | OUTPATIENT
Start: 2025-01-13

## 2025-01-13 RX ORDER — COLESTIPOL HYDROCHLORIDE 1 G/1
TABLET ORAL
Qty: 60 TABLET | Refills: 3 | Status: SHIPPED | OUTPATIENT
Start: 2025-01-13

## 2025-01-13 SDOH — ECONOMIC STABILITY: FOOD INSECURITY: WITHIN THE PAST 12 MONTHS, THE FOOD YOU BOUGHT JUST DIDN'T LAST AND YOU DIDN'T HAVE MONEY TO GET MORE.: NEVER TRUE

## 2025-01-13 SDOH — ECONOMIC STABILITY: FOOD INSECURITY: WITHIN THE PAST 12 MONTHS, YOU WORRIED THAT YOUR FOOD WOULD RUN OUT BEFORE YOU GOT MONEY TO BUY MORE.: NEVER TRUE

## 2025-01-13 ASSESSMENT — ENCOUNTER SYMPTOMS
ABDOMINAL PAIN: 0
BACK PAIN: 0
COLOR CHANGE: 0
VOMITING: 0
COUGH: 0
DIARRHEA: 0
WHEEZING: 0
EYE DISCHARGE: 0
NAUSEA: 0

## 2025-01-13 ASSESSMENT — PATIENT HEALTH QUESTIONNAIRE - PHQ9
SUM OF ALL RESPONSES TO PHQ QUESTIONS 1-9: 0
2. FEELING DOWN, DEPRESSED OR HOPELESS: NOT AT ALL
SUM OF ALL RESPONSES TO PHQ QUESTIONS 1-9: 0
SUM OF ALL RESPONSES TO PHQ QUESTIONS 1-9: 0
SUM OF ALL RESPONSES TO PHQ9 QUESTIONS 1 & 2: 0
1. LITTLE INTEREST OR PLEASURE IN DOING THINGS: NOT AT ALL
SUM OF ALL RESPONSES TO PHQ QUESTIONS 1-9: 0

## 2025-01-13 NOTE — PROGRESS NOTES
SUBJECTIVE:    Patient ID: Linda Durham is a 79 y.o. female.    HPI:   Patient is seen today for complaints of the shoulder swelling and pain.  Her shoulder has bothered her for a long time and she states that she is actually seen orthopedic [place] for this.  They have never done injections in her shoulder.  She states that she had x-rays done back about a year ago.  She states that she got concerned over the last month because she states that it was more swollen and was very tender and she cannot stand for her bra strap to even touch this area.  She states that she has had an injury to this shoulder previously.  She states that she has not noticed any redness or warmth to the shoulder.  She has not run fever.  She states that the shoulder does still bother her especially if she tries to move her shoulder in a certain direction.  She states that she just wanted to have it seen to make sure that it was not a growth or a mass that needed to be looked at.    She does need refills on her medications today including her blood pressure medication.  This was sent over to the pharmacy.  She also needs refills on her Nexium which she takes for GERD    Past Medical History:   Diagnosis Date    Carpal tunnel syndrome, right     HIGH CHOLESTEROL     Hypertension     Hypothyroidism     Osteoarthritis     Osteoporosis       Current Outpatient Medications on File Prior to Visit   Medication Sig Dispense Refill    sertraline (ZOLOFT) 100 MG tablet TAKE ONE TABLET BY MOUTH EVERY DAY 30 tablet 5    levothyroxine (SYNTHROID) 88 MCG tablet TAKE ONE TABLET BY MOUTH EVERY DAY PLUS AN EXTRA ONE-HALF TABLET ON SUNDAYS 98 tablet 2    celecoxib (CELEBREX) 100 MG capsule Take 1 capsule by mouth 2 times daily      mupirocin (BACTROBAN) 2 % ointment Apply topically 2 times daily Apply topically 3 times daily. 30 g 0    triamcinolone (KENALOG) 0.1 % cream APPLY TOPICALLY TWO TIMES A DAY FOR 7 DAYS 60 g 0    nystatin (MYCOSTATIN) 907816

## 2025-01-21 RX ORDER — CELECOXIB 100 MG/1
100 CAPSULE ORAL 2 TIMES DAILY
Qty: 60 CAPSULE | Refills: 2 | Status: SHIPPED | OUTPATIENT
Start: 2025-01-21

## 2025-02-03 ENCOUNTER — OFFICE VISIT (OUTPATIENT)
Dept: PRIMARY CARE CLINIC | Age: 80
End: 2025-02-03

## 2025-02-03 ENCOUNTER — ANCILLARY PROCEDURE (OUTPATIENT)
Dept: PRIMARY CARE CLINIC | Age: 80
End: 2025-02-03
Payer: MEDICARE

## 2025-02-03 VITALS
WEIGHT: 224 LBS | TEMPERATURE: 99 F | DIASTOLIC BLOOD PRESSURE: 78 MMHG | BODY MASS INDEX: 39.69 KG/M2 | SYSTOLIC BLOOD PRESSURE: 128 MMHG | OXYGEN SATURATION: 90 % | HEART RATE: 74 BPM | HEIGHT: 63 IN

## 2025-02-03 DIAGNOSIS — R05.1 ACUTE COUGH: ICD-10-CM

## 2025-02-03 DIAGNOSIS — R09.89 CHEST CONGESTION: ICD-10-CM

## 2025-02-03 DIAGNOSIS — J06.9 URI, ACUTE: Primary | ICD-10-CM

## 2025-02-03 DIAGNOSIS — R50.9 FEVER, UNSPECIFIED FEVER CAUSE: ICD-10-CM

## 2025-02-03 LAB
INFLUENZA A ANTIBODY: NORMAL
INFLUENZA B ANTIBODY: NORMAL
RSV RAPID ANTIGEN: NORMAL

## 2025-02-03 PROCEDURE — 71046 X-RAY EXAM CHEST 2 VIEWS: CPT | Performed by: FAMILY MEDICINE

## 2025-02-03 RX ORDER — GUAIFENESIN 600 MG/1
600 TABLET, EXTENDED RELEASE ORAL 2 TIMES DAILY
Qty: 30 TABLET | Refills: 0 | Status: SHIPPED | OUTPATIENT
Start: 2025-02-03 | End: 2025-02-18

## 2025-02-03 RX ORDER — DEXTROMETHORPHAN HYDROBROMIDE AND PROMETHAZINE HYDROCHLORIDE 15; 6.25 MG/5ML; MG/5ML
5 SYRUP ORAL 4 TIMES DAILY PRN
Qty: 240 ML | Refills: 0 | Status: SHIPPED | OUTPATIENT
Start: 2025-02-03 | End: 2025-02-10

## 2025-02-03 ASSESSMENT — ENCOUNTER SYMPTOMS
COLOR CHANGE: 0
ABDOMINAL PAIN: 0
WHEEZING: 0
EYE DISCHARGE: 0
BACK PAIN: 0
COUGH: 1
NAUSEA: 0
DIARRHEA: 0
VOMITING: 0

## 2025-02-03 NOTE — PROGRESS NOTES
SUBJECTIVE:    Patient ID: Linda Durham is a 79 y.o. female.    HPI:   Patient is seen today for complaints of cough congestion fever fatigue and headaches.  She states that she started feeling bad Friday or Saturday.  She states that her  has been sick with similar symptoms.  She states that she is drinking plenty of fluids.  Her daughter is concerned about the cough and just as bad as she feels.  She states that she has not had any vomiting or diarrhea.  He states that she is taking Mucinex DM, Tessalon Perles but is not getting much relief from the coughing and states that she has not slept in several nights because of the cough and her 's cough    Past Medical History:   Diagnosis Date    Carpal tunnel syndrome, right     HIGH CHOLESTEROL     Hypertension     Hypothyroidism     Osteoarthritis     Osteoporosis       Current Outpatient Medications on File Prior to Visit   Medication Sig Dispense Refill    colestipol (COLESTID) 1 g tablet TAKE ONE TABLET BY MOUTH TWICE A DAY 60 tablet 3    candesartan (ATACAND) 32 MG tablet TAKE ONE TABLET BY MOUTH EVERY DAY 30 tablet 3    pravastatin (PRAVACHOL) 10 MG tablet TAKE ONE TABLET BY MOUTH EVERY DAY 30 tablet 3    propranolol (INDERAL LA) 120 MG extended release capsule TAKE ONE CAPSULE BY MOUTH EVERY DAY 30 capsule 3    indapamide (LOZOL) 2.5 MG tablet TAKE ONE TABLET BY MOUTH EVERY DAY 30 tablet 3    esomeprazole (NEXIUM) 40 MG delayed release capsule Take 1 capsule by mouth every morning (before breakfast) 30 capsule 5    sertraline (ZOLOFT) 100 MG tablet TAKE ONE TABLET BY MOUTH EVERY DAY 30 tablet 5    levothyroxine (SYNTHROID) 88 MCG tablet TAKE ONE TABLET BY MOUTH EVERY DAY PLUS AN EXTRA ONE-HALF TABLET ON SUNDAYS 98 tablet 2    mupirocin (BACTROBAN) 2 % ointment Apply topically 2 times daily Apply topically 3 times daily. 30 g 0    nystatin (MYCOSTATIN) 755567 UNIT/GM ointment Apply topically 2 times daily. 60 g 0    Calcium-Vitamin D-Vitamin K

## 2025-02-20 ENCOUNTER — PATIENT MESSAGE (OUTPATIENT)
Dept: PRIMARY CARE CLINIC | Age: 80
End: 2025-02-20

## 2025-02-20 RX ORDER — AZITHROMYCIN 250 MG/1
TABLET, FILM COATED ORAL
Qty: 6 TABLET | Refills: 0 | Status: SHIPPED | OUTPATIENT
Start: 2025-02-20

## 2025-02-20 RX ORDER — METHYLPREDNISOLONE 4 MG/1
TABLET ORAL
Qty: 1 KIT | Refills: 0 | Status: SHIPPED | OUTPATIENT
Start: 2025-02-20 | End: 2025-02-26

## 2025-03-20 ENCOUNTER — OFFICE VISIT (OUTPATIENT)
Age: 80
End: 2025-03-20

## 2025-03-20 DIAGNOSIS — M17.0 BILATERAL PRIMARY OSTEOARTHRITIS OF KNEE: Primary | ICD-10-CM

## 2025-03-20 DIAGNOSIS — M25.362 INSTABILITY OF LEFT KNEE JOINT: ICD-10-CM

## 2025-03-20 RX ORDER — BETAMETHASONE SODIUM PHOSPHATE AND BETAMETHASONE ACETATE 3; 3 MG/ML; MG/ML
6 INJECTION, SUSPENSION INTRA-ARTICULAR; INTRALESIONAL; INTRAMUSCULAR; SOFT TISSUE ONCE
Status: COMPLETED | OUTPATIENT
Start: 2025-03-20 | End: 2025-03-20

## 2025-03-20 RX ORDER — BUPIVACAINE HYDROCHLORIDE 5 MG/ML
3 INJECTION, SOLUTION EPIDURAL; INTRACAUDAL; PERINEURAL ONCE
Status: COMPLETED | OUTPATIENT
Start: 2025-03-20 | End: 2025-03-20

## 2025-03-20 RX ORDER — LIDOCAINE HYDROCHLORIDE 10 MG/ML
1 INJECTION, SOLUTION INFILTRATION; PERINEURAL ONCE
Status: COMPLETED | OUTPATIENT
Start: 2025-03-20 | End: 2025-03-20

## 2025-03-20 RX ADMIN — BUPIVACAINE HYDROCHLORIDE 15 MG: 5 INJECTION, SOLUTION EPIDURAL; INTRACAUDAL; PERINEURAL at 10:57

## 2025-03-20 RX ADMIN — BETAMETHASONE SODIUM PHOSPHATE AND BETAMETHASONE ACETATE 6 MG: 3; 3 INJECTION, SUSPENSION INTRA-ARTICULAR; INTRALESIONAL; INTRAMUSCULAR; SOFT TISSUE at 10:57

## 2025-03-20 RX ADMIN — LIDOCAINE HYDROCHLORIDE 1 ML: 10 INJECTION, SOLUTION INFILTRATION; PERINEURAL at 10:58

## 2025-03-20 NOTE — PROGRESS NOTES
tolerated the procedure well.       LEFT KNEE INJECTION:   Informed consent obtained from the patient. Risks of injection discussed with the patient to include by are not limited to: incomplete resolution of symptoms, local skin irritation, temporary elevation in blood sugar and blood pressure, tendinopathy, chondrotoxicity.     After sterile prep, understanding the risks, benefits and alternatives, 1 mL of 1% lidocaine, 3 mL to 0.5% bupivacaine and 6 milligrams Celestone was injected with 22-1/2 gauge needle to the left inferior lateral entry portal of knee. Hemostasis was achieved and a Band-Aid applied. The patient tolerated the procedure well.     Greater than 20 minutes were spent with this encounter. Time spent included evaluating the patient's chart prior to arrival.  Evaluating the patient in the office including history, physical examination, imaging reviewing, and counseling on next steps.  Lastly, time was spent discussing orders with my staff as well as providing documentation in the chart.     Return in about 3 months (around 6/20/2025) for Bilateral knee pain.   No orders of the defined types were placed in this encounter.        Electronically signed by Titi Gibbs PA-C on 3/20/2025 at 10:04 AM.    Dragon Disclaimer:   This note was dictated with voice recognition software.  Though review and corrections are routine, we apologize for any errors.

## 2025-04-11 RX ORDER — INDAPAMIDE 2.5 MG/1
2.5 TABLET ORAL DAILY
Qty: 30 TABLET | Refills: 3 | Status: SHIPPED | OUTPATIENT
Start: 2025-04-11

## 2025-05-05 ENCOUNTER — APPOINTMENT (OUTPATIENT)
Dept: CT IMAGING | Facility: HOSPITAL | Age: 80
End: 2025-05-05
Payer: MEDICARE

## 2025-05-05 ENCOUNTER — ANESTHESIA EVENT (OUTPATIENT)
Dept: PERIOP | Facility: HOSPITAL | Age: 80
End: 2025-05-05
Payer: MEDICARE

## 2025-05-05 ENCOUNTER — HOSPITAL ENCOUNTER (OUTPATIENT)
Facility: HOSPITAL | Age: 80
Discharge: HOME | End: 2025-05-05
Attending: EMERGENCY MEDICINE | Admitting: SURGERY
Payer: MEDICARE

## 2025-05-05 ENCOUNTER — ANESTHESIA (OUTPATIENT)
Dept: PERIOP | Facility: HOSPITAL | Age: 80
End: 2025-05-05
Payer: MEDICARE

## 2025-05-05 VITALS
WEIGHT: 201 LBS | RESPIRATION RATE: 16 BRPM | SYSTOLIC BLOOD PRESSURE: 138 MMHG | DIASTOLIC BLOOD PRESSURE: 52 MMHG | HEART RATE: 60 BPM | TEMPERATURE: 98.1 F | OXYGEN SATURATION: 97 % | BODY MASS INDEX: 35.61 KG/M2 | HEIGHT: 63 IN

## 2025-05-05 DIAGNOSIS — S20.219A CONTUSION OF CHEST WALL, UNSPECIFIED LATERALITY, INITIAL ENCOUNTER: ICD-10-CM

## 2025-05-05 DIAGNOSIS — Z74.09 IMPAIRED MOBILITY: ICD-10-CM

## 2025-05-05 DIAGNOSIS — S22.20XA CLOSED FRACTURE OF STERNUM, UNSPECIFIED PORTION OF STERNUM, INITIAL ENCOUNTER: ICD-10-CM

## 2025-05-05 DIAGNOSIS — S01.81XA FACIAL LACERATION, INITIAL ENCOUNTER: Primary | ICD-10-CM

## 2025-05-05 DIAGNOSIS — N28.1 RENAL CYST: ICD-10-CM

## 2025-05-05 DIAGNOSIS — N18.9 CHRONIC KIDNEY DISEASE, UNSPECIFIED CKD STAGE: ICD-10-CM
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
## 2025-05-05 LAB
ANION GAP SERPL CALCULATED.3IONS-SCNC: 12 MMOL/L (ref 5–15)
ANION GAP SERPL CALCULATED.3IONS-SCNC: 9 MMOL/L (ref 5–15)
BASOPHILS # BLD AUTO: 0.06 10*3/MM3 (ref 0–0.2)
BASOPHILS NFR BLD AUTO: 0.6 % (ref 0–1.5)
BUN SERPL-MCNC: 28 MG/DL (ref 8–23)
BUN SERPL-MCNC: 33 MG/DL (ref 8–23)
BUN/CREAT SERPL: 28.3 (ref 7–25)
BUN/CREAT SERPL: 31.1 (ref 7–25)
CALCIUM SPEC-SCNC: 8.7 MG/DL (ref 8.6–10.5)
CALCIUM SPEC-SCNC: 9.2 MG/DL (ref 8.6–10.5)
CHLORIDE SERPL-SCNC: 102 MMOL/L (ref 98–107)
CHLORIDE SERPL-SCNC: 98 MMOL/L (ref 98–107)
CO2 SERPL-SCNC: 25 MMOL/L (ref 22–29)
CO2 SERPL-SCNC: 26 MMOL/L (ref 22–29)
CREAT SERPL-MCNC: 0.99 MG/DL (ref 0.57–1)
CREAT SERPL-MCNC: 1.06 MG/DL (ref 0.57–1)
DEPRECATED RDW RBC AUTO: 45.5 FL (ref 37–54)
DEPRECATED RDW RBC AUTO: 45.8 FL (ref 37–54)
EGFRCR SERPLBLD CKD-EPI 2021: 53.2 ML/MIN/1.73
EGFRCR SERPLBLD CKD-EPI 2021: 57.8 ML/MIN/1.73
EOSINOPHIL # BLD AUTO: 0.16 10*3/MM3 (ref 0–0.4)
EOSINOPHIL NFR BLD AUTO: 1.6 % (ref 0.3–6.2)
ERYTHROCYTE [DISTWIDTH] IN BLOOD BY AUTOMATED COUNT: 13.5 % (ref 12.3–15.4)
ERYTHROCYTE [DISTWIDTH] IN BLOOD BY AUTOMATED COUNT: 13.6 % (ref 12.3–15.4)
GEN 5 1HR TROPONIN T REFLEX: 11 NG/L
GLUCOSE BLDC GLUCOMTR-MCNC: 111 MG/DL (ref 70–130)
GLUCOSE SERPL-MCNC: 102 MG/DL (ref 65–99)
GLUCOSE SERPL-MCNC: 114 MG/DL (ref 65–99)
HCT VFR BLD AUTO: 32.6 % (ref 34–46.6)
HCT VFR BLD AUTO: 36 % (ref 34–46.6)
HGB BLD-MCNC: 10.3 G/DL (ref 12–15.9)
HGB BLD-MCNC: 11.6 G/DL (ref 12–15.9)
IMM GRANULOCYTES # BLD AUTO: 0.12 10*3/MM3 (ref 0–0.05)
IMM GRANULOCYTES NFR BLD AUTO: 1.2 % (ref 0–0.5)
LYMPHOCYTES # BLD AUTO: 1.2 10*3/MM3 (ref 0.7–3.1)
LYMPHOCYTES NFR BLD AUTO: 12.4 % (ref 19.6–45.3)
MCH RBC QN AUTO: 29.3 PG (ref 26.6–33)
MCH RBC QN AUTO: 29.6 PG (ref 26.6–33)
MCHC RBC AUTO-ENTMCNC: 31.6 G/DL (ref 31.5–35.7)
MCHC RBC AUTO-ENTMCNC: 32.2 G/DL (ref 31.5–35.7)
MCV RBC AUTO: 91.8 FL (ref 79–97)
MCV RBC AUTO: 92.9 FL (ref 79–97)
MONOCYTES # BLD AUTO: 0.78 10*3/MM3 (ref 0.1–0.9)
MONOCYTES NFR BLD AUTO: 8 % (ref 5–12)
NEUTROPHILS NFR BLD AUTO: 7.38 10*3/MM3 (ref 1.7–7)
NEUTROPHILS NFR BLD AUTO: 76.2 % (ref 42.7–76)
NRBC BLD AUTO-RTO: 0 /100 WBC (ref 0–0.2)
PLATELET # BLD AUTO: 182 10*3/MM3 (ref 140–450)
PLATELET # BLD AUTO: 251 10*3/MM3 (ref 140–450)
PMV BLD AUTO: 10.6 FL (ref 6–12)
PMV BLD AUTO: 10.8 FL (ref 6–12)
POTASSIUM SERPL-SCNC: 4.3 MMOL/L (ref 3.5–5.2)
POTASSIUM SERPL-SCNC: 4.6 MMOL/L (ref 3.5–5.2)
RBC # BLD AUTO: 3.51 10*6/MM3 (ref 3.77–5.28)
RBC # BLD AUTO: 3.92 10*6/MM3 (ref 3.77–5.28)
SODIUM SERPL-SCNC: 135 MMOL/L (ref 136–145)
SODIUM SERPL-SCNC: 137 MMOL/L (ref 136–145)
TROPONIN T NUMERIC DELTA: 0 NG/L
TROPONIN T SERPL HS-MCNC: 11 NG/L
WBC NRBC COR # BLD AUTO: 7.95 10*3/MM3 (ref 3.4–10.8)
WBC NRBC COR # BLD AUTO: 9.7 10*3/MM3 (ref 3.4–10.8)

## 2025-05-05 PROCEDURE — G0378 HOSPITAL OBSERVATION PER HR: HCPCS

## 2025-05-05 PROCEDURE — 97165 OT EVAL LOW COMPLEX 30 MIN: CPT | Performed by: OCCUPATIONAL THERAPIST

## 2025-05-05 PROCEDURE — 90471 IMMUNIZATION ADMIN: CPT | Performed by: EMERGENCY MEDICINE

## 2025-05-05 PROCEDURE — 25010000002 CEFAZOLIN PER 500 MG: Performed by: EMERGENCY MEDICINE

## 2025-05-05 PROCEDURE — A9270 NON-COVERED ITEM OR SERVICE: HCPCS

## 2025-05-05 PROCEDURE — 63710000001 OXYCODONE-ACETAMINOPHEN 5-325 MG TABLET: Performed by: INTERNAL MEDICINE

## 2025-05-05 PROCEDURE — 99285 EMERGENCY DEPT VISIT HI MDM: CPT | Performed by: EMERGENCY MEDICINE

## 2025-05-05 PROCEDURE — 25010000002 LIDOCAINE 1% - EPINEPHRINE 1:100000 1 %-1:100000 SOLUTION: Performed by: SURGERY

## 2025-05-05 PROCEDURE — A9270 NON-COVERED ITEM OR SERVICE: HCPCS | Performed by: INTERNAL MEDICINE

## 2025-05-05 PROCEDURE — 25810000003 SODIUM CHLORIDE 0.9 % SOLUTION

## 2025-05-05 PROCEDURE — 80048 BASIC METABOLIC PNL TOTAL CA: CPT | Performed by: SURGERY

## 2025-05-05 PROCEDURE — A9270 NON-COVERED ITEM OR SERVICE: HCPCS | Performed by: SURGERY

## 2025-05-05 PROCEDURE — 25010000002 PROPOFOL 10 MG/ML EMULSION: Performed by: NURSE ANESTHETIST, CERTIFIED REGISTERED

## 2025-05-05 PROCEDURE — 96375 TX/PRO/DX INJ NEW DRUG ADDON: CPT

## 2025-05-05 PROCEDURE — 85027 COMPLETE CBC AUTOMATED: CPT | Performed by: SURGERY

## 2025-05-05 PROCEDURE — 63710000001 MUPIROCIN 2 % OINTMENT 22 G TUBE: Performed by: SURGERY

## 2025-05-05 PROCEDURE — 25010000002 ONDANSETRON PER 1 MG: Performed by: EMERGENCY MEDICINE

## 2025-05-05 PROCEDURE — 25010000002 TETANUS-DIPHTH-ACELL PERTUSSIS 5-2.5-18.5 LF-MCG/0.5 SUSPENSION PREFILLED SYRINGE: Performed by: EMERGENCY MEDICINE

## 2025-05-05 PROCEDURE — 90715 TDAP VACCINE 7 YRS/> IM: CPT | Performed by: EMERGENCY MEDICINE

## 2025-05-05 PROCEDURE — 93005 ELECTROCARDIOGRAM TRACING: CPT | Performed by: EMERGENCY MEDICINE

## 2025-05-05 PROCEDURE — 25810000003 LACTATED RINGERS PER 1000 ML: Performed by: NURSE ANESTHETIST, CERTIFIED REGISTERED

## 2025-05-05 PROCEDURE — 25810000003 SODIUM CHLORIDE 0.9 % SOLUTION: Performed by: SURGERY

## 2025-05-05 PROCEDURE — 82948 REAGENT STRIP/BLOOD GLUCOSE: CPT

## 2025-05-05 PROCEDURE — 85025 COMPLETE CBC W/AUTO DIFF WBC: CPT | Performed by: EMERGENCY MEDICINE

## 2025-05-05 PROCEDURE — 25010000002 PROPOFOL 1000 MG/100ML EMULSION: Performed by: NURSE ANESTHETIST, CERTIFIED REGISTERED

## 2025-05-05 PROCEDURE — 70450 CT HEAD/BRAIN W/O DYE: CPT

## 2025-05-05 PROCEDURE — 63710000001 LEVOTHYROXINE 88 MCG TABLET: Performed by: SURGERY

## 2025-05-05 PROCEDURE — 25010000002 FENTANYL CITRATE (PF) 50 MCG/ML SOLUTION: Performed by: EMERGENCY MEDICINE

## 2025-05-05 PROCEDURE — 84484 ASSAY OF TROPONIN QUANT: CPT | Performed by: EMERGENCY MEDICINE

## 2025-05-05 PROCEDURE — 97161 PT EVAL LOW COMPLEX 20 MIN: CPT

## 2025-05-05 PROCEDURE — 96365 THER/PROPH/DIAG IV INF INIT: CPT

## 2025-05-05 PROCEDURE — 63710000001 PANTOPRAZOLE 40 MG TABLET DELAYED-RELEASE: Performed by: SURGERY

## 2025-05-05 PROCEDURE — 25010000002 FENTANYL CITRATE (PF) 100 MCG/2ML SOLUTION: Performed by: NURSE ANESTHETIST, CERTIFIED REGISTERED

## 2025-05-05 PROCEDURE — 63710000001 PROPRANOLOL 40 MG TABLET

## 2025-05-05 PROCEDURE — 63710000001 ACETAMINOPHEN 325 MG TABLET: Performed by: SURGERY

## 2025-05-05 PROCEDURE — 71250 CT THORAX DX C-: CPT

## 2025-05-05 PROCEDURE — 80048 BASIC METABOLIC PNL TOTAL CA: CPT | Performed by: EMERGENCY MEDICINE

## 2025-05-05 PROCEDURE — 72125 CT NECK SPINE W/O DYE: CPT

## 2025-05-05 PROCEDURE — 70486 CT MAXILLOFACIAL W/O DYE: CPT

## 2025-05-05 RX ORDER — PROPOFOL 10 MG/ML
VIAL (ML) INTRAVENOUS AS NEEDED
Status: DISCONTINUED | OUTPATIENT
Start: 2025-05-05 | End: 2025-05-05 | Stop reason: SURG

## 2025-05-05 RX ORDER — MUPIROCIN 20 MG/G
1 OINTMENT TOPICAL 3 TIMES DAILY
Qty: 22 G | Refills: 0 | Status: SHIPPED | OUTPATIENT
Start: 2025-05-05

## 2025-05-05 RX ORDER — FENTANYL CITRATE 50 UG/ML
INJECTION, SOLUTION INTRAMUSCULAR; INTRAVENOUS AS NEEDED
Status: DISCONTINUED | OUTPATIENT
Start: 2025-05-05 | End: 2025-05-05 | Stop reason: SURG

## 2025-05-05 RX ORDER — LABETALOL HYDROCHLORIDE 5 MG/ML
5 INJECTION, SOLUTION INTRAVENOUS
Status: DISCONTINUED | OUTPATIENT
Start: 2025-05-05 | End: 2025-05-05 | Stop reason: HOSPADM

## 2025-05-05 RX ORDER — LEVOTHYROXINE SODIUM 88 UG/1
88 TABLET ORAL
Status: DISCONTINUED | OUTPATIENT
Start: 2025-05-05 | End: 2025-05-05 | Stop reason: HOSPADM

## 2025-05-05 RX ORDER — SODIUM CHLORIDE 9 MG/ML
40 INJECTION, SOLUTION INTRAVENOUS AS NEEDED
Status: DISCONTINUED | OUTPATIENT
Start: 2025-05-05 | End: 2025-05-05 | Stop reason: HOSPADM

## 2025-05-05 RX ORDER — OXYCODONE AND ACETAMINOPHEN 5; 325 MG/1; MG/1
1 TABLET ORAL ONCE
Refills: 0 | Status: COMPLETED | OUTPATIENT
Start: 2025-05-05 | End: 2025-05-05

## 2025-05-05 RX ORDER — ONDANSETRON 2 MG/ML
4 INJECTION INTRAMUSCULAR; INTRAVENOUS ONCE AS NEEDED
Status: DISCONTINUED | OUTPATIENT
Start: 2025-05-05 | End: 2025-05-05 | Stop reason: HOSPADM

## 2025-05-05 RX ORDER — FENTANYL CITRATE 50 UG/ML
25 INJECTION, SOLUTION INTRAMUSCULAR; INTRAVENOUS ONCE
Refills: 0 | Status: COMPLETED | OUTPATIENT
Start: 2025-05-05 | End: 2025-05-05

## 2025-05-05 RX ORDER — OXYCODONE AND ACETAMINOPHEN 5; 325 MG/1; MG/1
1 TABLET ORAL EVERY 6 HOURS PRN
Qty: 12 TABLET | Refills: 0 | Status: SHIPPED | OUTPATIENT
Start: 2025-05-05

## 2025-05-05 RX ORDER — SODIUM CHLORIDE 9 MG/ML
75 INJECTION, SOLUTION INTRAVENOUS CONTINUOUS
Status: DISPENSED | OUTPATIENT
Start: 2025-05-05 | End: 2025-05-05

## 2025-05-05 RX ORDER — SODIUM CHLORIDE 0.9 % (FLUSH) 0.9 %
10 SYRINGE (ML) INJECTION AS NEEDED
Status: DISCONTINUED | OUTPATIENT
Start: 2025-05-05 | End: 2025-05-05 | Stop reason: HOSPADM

## 2025-05-05 RX ORDER — FENTANYL CITRATE 50 UG/ML
50 INJECTION, SOLUTION INTRAMUSCULAR; INTRAVENOUS
Status: DISCONTINUED | OUTPATIENT
Start: 2025-05-05 | End: 2025-05-05 | Stop reason: HOSPADM

## 2025-05-05 RX ORDER — FENTANYL CITRATE 50 UG/ML
25 INJECTION, SOLUTION INTRAMUSCULAR; INTRAVENOUS
Status: DISCONTINUED | OUTPATIENT
Start: 2025-05-05 | End: 2025-05-05 | Stop reason: HOSPADM

## 2025-05-05 RX ORDER — SODIUM CHLORIDE 0.9 % (FLUSH) 0.9 %
10 SYRINGE (ML) INJECTION EVERY 12 HOURS SCHEDULED
Status: DISCONTINUED | OUTPATIENT
Start: 2025-05-05 | End: 2025-05-05 | Stop reason: HOSPADM

## 2025-05-05 RX ORDER — ACETAMINOPHEN 160 MG/5ML
650 SOLUTION ORAL EVERY 4 HOURS PRN
Status: DISCONTINUED | OUTPATIENT
Start: 2025-05-05 | End: 2025-05-05 | Stop reason: HOSPADM

## 2025-05-05 RX ORDER — PROPOFOL 10 MG/ML
INJECTION, EMULSION INTRAVENOUS CONTINUOUS PRN
Status: DISCONTINUED | OUTPATIENT
Start: 2025-05-05 | End: 2025-05-05 | Stop reason: SURG

## 2025-05-05 RX ORDER — LIDOCAINE HYDROCHLORIDE AND EPINEPHRINE 10; 10 MG/ML; UG/ML
INJECTION, SOLUTION INFILTRATION; PERINEURAL AS NEEDED
Status: DISCONTINUED | OUTPATIENT
Start: 2025-05-05 | End: 2025-05-05 | Stop reason: HOSPADM

## 2025-05-05 RX ORDER — FLUMAZENIL 0.1 MG/ML
0.2 INJECTION INTRAVENOUS AS NEEDED
Status: DISCONTINUED | OUTPATIENT
Start: 2025-05-05 | End: 2025-05-05 | Stop reason: HOSPADM

## 2025-05-05 RX ORDER — MAGNESIUM HYDROXIDE 1200 MG/15ML
LIQUID ORAL AS NEEDED
Status: DISCONTINUED | OUTPATIENT
Start: 2025-05-05 | End: 2025-05-05 | Stop reason: HOSPADM

## 2025-05-05 RX ORDER — NALOXONE HCL 0.4 MG/ML
0.4 VIAL (ML) INJECTION AS NEEDED
Status: DISCONTINUED | OUTPATIENT
Start: 2025-05-05 | End: 2025-05-05 | Stop reason: HOSPADM

## 2025-05-05 RX ORDER — MUPIROCIN 20 MG/G
OINTMENT TOPICAL AS NEEDED
Status: DISCONTINUED | OUTPATIENT
Start: 2025-05-05 | End: 2025-05-05 | Stop reason: HOSPADM

## 2025-05-05 RX ORDER — ASPIRIN 81 MG/1
81 TABLET, CHEWABLE ORAL DAILY
Status: DISCONTINUED | OUTPATIENT
Start: 2025-05-05 | End: 2025-05-05 | Stop reason: HOSPADM

## 2025-05-05 RX ORDER — PROPRANOLOL HYDROCHLORIDE 40 MG/1
40 TABLET ORAL EVERY 8 HOURS SCHEDULED
Status: DISCONTINUED | OUTPATIENT
Start: 2025-05-05 | End: 2025-05-05 | Stop reason: HOSPADM

## 2025-05-05 RX ORDER — PROPRANOLOL HYDROCHLORIDE 60 MG/1
120 CAPSULE, EXTENDED RELEASE ORAL DAILY
Status: DISCONTINUED | OUTPATIENT
Start: 2025-05-05 | End: 2025-05-05 | Stop reason: CLARIF

## 2025-05-05 RX ORDER — SODIUM CHLORIDE, SODIUM LACTATE, POTASSIUM CHLORIDE, CALCIUM CHLORIDE 600; 310; 30; 20 MG/100ML; MG/100ML; MG/100ML; MG/100ML
INJECTION, SOLUTION INTRAVENOUS CONTINUOUS PRN
Status: DISCONTINUED | OUTPATIENT
Start: 2025-05-05 | End: 2025-05-05 | Stop reason: SURG

## 2025-05-05 RX ORDER — HYDROCODONE BITARTRATE AND ACETAMINOPHEN 7.5; 325 MG/1; MG/1
2 TABLET ORAL EVERY 4 HOURS PRN
Status: DISCONTINUED | OUTPATIENT
Start: 2025-05-05 | End: 2025-05-05 | Stop reason: HOSPADM

## 2025-05-05 RX ORDER — PANTOPRAZOLE SODIUM 40 MG/1
40 TABLET, DELAYED RELEASE ORAL
Status: DISCONTINUED | OUTPATIENT
Start: 2025-05-05 | End: 2025-05-05 | Stop reason: HOSPADM

## 2025-05-05 RX ORDER — HYDROCODONE BITARTRATE AND ACETAMINOPHEN 5; 325 MG/1; MG/1
1 TABLET ORAL EVERY 4 HOURS PRN
Status: DISCONTINUED | OUTPATIENT
Start: 2025-05-05 | End: 2025-05-05 | Stop reason: HOSPADM

## 2025-05-05 RX ORDER — ONDANSETRON 2 MG/ML
4 INJECTION INTRAMUSCULAR; INTRAVENOUS ONCE
Status: COMPLETED | OUTPATIENT
Start: 2025-05-05 | End: 2025-05-05

## 2025-05-05 RX ORDER — PHENYLEPHRINE HCL IN 0.9% NACL 1 MG/10 ML
SYRINGE (ML) INTRAVENOUS AS NEEDED
Status: DISCONTINUED | OUTPATIENT
Start: 2025-05-05 | End: 2025-05-05 | Stop reason: SURG

## 2025-05-05 RX ORDER — IBUPROFEN 600 MG/1
600 TABLET, FILM COATED ORAL EVERY 6 HOURS PRN
Status: DISCONTINUED | OUTPATIENT
Start: 2025-05-05 | End: 2025-05-05 | Stop reason: HOSPADM

## 2025-05-05 RX ORDER — ACETAMINOPHEN 325 MG/1
650 TABLET ORAL EVERY 4 HOURS PRN
Status: DISCONTINUED | OUTPATIENT
Start: 2025-05-05 | End: 2025-05-05 | Stop reason: HOSPADM

## 2025-05-05 RX ADMIN — ONDANSETRON 4 MG: 2 INJECTION INTRAMUSCULAR; INTRAVENOUS at 04:07

## 2025-05-05 RX ADMIN — ACETAMINOPHEN 650 MG: 325 TABLET, FILM COATED ORAL at 10:18

## 2025-05-05 RX ADMIN — Medication 150 MCG: at 05:17

## 2025-05-05 RX ADMIN — OXYCODONE HYDROCHLORIDE AND ACETAMINOPHEN 1 TABLET: 5; 325 TABLET ORAL at 16:22

## 2025-05-05 RX ADMIN — PROPRANOLOL HYDROCHLORIDE 40 MG: 40 TABLET ORAL at 16:22

## 2025-05-05 RX ADMIN — Medication 10 ML: at 08:13

## 2025-05-05 RX ADMIN — SODIUM CHLORIDE, POTASSIUM CHLORIDE, SODIUM LACTATE AND CALCIUM CHLORIDE: 600; 310; 30; 20 INJECTION, SOLUTION INTRAVENOUS at 04:50

## 2025-05-05 RX ADMIN — Medication 150 MCG: at 05:26

## 2025-05-05 RX ADMIN — PROPOFOL 75 MCG/KG/MIN: 10 INJECTION, EMULSION INTRAVENOUS at 04:56

## 2025-05-05 RX ADMIN — TETANUS TOXOID, REDUCED DIPHTHERIA TOXOID AND ACELLULAR PERTUSSIS VACCINE, ADSORBED 0.5 ML: 5; 2.5; 8; 8; 2.5 SUSPENSION INTRAMUSCULAR at 00:46

## 2025-05-05 RX ADMIN — PROPOFOL 40 MG: 10 INJECTION, EMULSION INTRAVENOUS at 04:52

## 2025-05-05 RX ADMIN — PANTOPRAZOLE SODIUM 40 MG: 40 TABLET, DELAYED RELEASE ORAL at 06:20

## 2025-05-05 RX ADMIN — LEVOTHYROXINE SODIUM 88 MCG: 88 TABLET ORAL at 06:20

## 2025-05-05 RX ADMIN — PROPRANOLOL HYDROCHLORIDE 40 MG: 40 TABLET ORAL at 08:13

## 2025-05-05 RX ADMIN — FENTANYL CITRATE 50 MCG: 50 INJECTION, SOLUTION INTRAMUSCULAR; INTRAVENOUS at 04:50

## 2025-05-05 RX ADMIN — CEFAZOLIN 2000 MG: 2 INJECTION, POWDER, FOR SOLUTION INTRAMUSCULAR; INTRAVENOUS at 03:37

## 2025-05-05 RX ADMIN — SODIUM CHLORIDE 75 ML/HR: 9 INJECTION, SOLUTION INTRAVENOUS at 06:20

## 2025-05-05 RX ADMIN — FENTANYL CITRATE 25 MCG: 50 INJECTION, SOLUTION INTRAMUSCULAR; INTRAVENOUS at 04:04

## 2025-05-05 NOTE — ED NOTES
The following fall interventions were initiated:    [x] Patient and/or family given education   [x] Call light within reach and educated on how to use   [x] Bed rails up per protocol    [x] Bed locked and in the lowest position   [x] Bed alarm set and on loudest setting   [x] Fall wrist band applied   [x] Non skid footwear applied   [x] Room free of clutter   [x] Patient items within reach   [x] Adequate lighting provided  [x] Falls sign present    [x] Patient moved closer to nursing station   [] Restraints applied

## 2025-05-05 NOTE — H&P
[facility] Medicine Services  HISTORY AND PHYSICAL    Date of Admission: 5/5/2025  Primary Care Physician: Deann Ocampo MD    Subjective   Primary Historian: Patient    Chief Complaint: Fall and laceration    History of Present Illness  This is an 79-year-old female patient with a medical history of hypertension, hypothyroidism, presenting to the ED after having a fall at her home.  As she mentioned, she was the bathroom and she lost her balance and she fell hitting her forehead on the sink, and sustaining a large laceration.  She denies having any loss of consciousness.  She denies passing out.  She mentions that she has been having issues with balance for few years.  At baseline, she walks with a walker.  She denies having any chest pain, shortness of breath, fever, chills, abdominal pain, nausea vomiting or diarrhea.        Review of Systems   Otherwise complete ROS reviewed and negative except as mentioned in the HPI.    Past Medical History:   Past Medical History:   Diagnosis Date    Disease of thyroid gland     Elevated lipids     Fracture, femur     Right    GERD (gastroesophageal reflux disease)     Hiatal hernia     Hypertension     Osteoporosis      Past Surgical History:  Past Surgical History:   Procedure Laterality Date    ANCHOVY PROCEDURE      BREAST BIOPSY Left 2022    benign    BREAST EXCISIONAL BIOPSY Right     Benign breast tumors    BREAST EXCISIONAL BIOPSY Right     CHOLECYSTECTOMY      COLON RESECTION      FEMUR OPEN REDUCTION INTERNAL FIXATION Right 03/26/2019    Procedure: OPEN REDUCTION AND INTERNAL FIXATION-FEMUR;  Surgeon: Tu Paredes MD;  Location: [facility];  Service: Orthopedics    HAND SURGERY Right     HYSTERECTOMY       Social History:  reports that she has never smoked. She has never used smokeless tobacco. She reports that she does not drink alcohol and does not use drugs.    Family History: family history includes  Heart failure in her father and mother; Hypertension in her father and mother.       Allergies:  Allergies   Allergen Reactions    Codeine Shortness Of Breath       Medications:  Prior to Admission medications    Medication Sig Start Date End Date Taking? Authorizing Provider   ALPRAZolam (XANAX) 0.5 MG tablet Take 1 tablet 3 times a day by oral route.    Iman Richards MD   aspirin 81 MG chewable tablet Chew 1 tablet Daily.    Iman Richards MD   Calcium Citrate-Vitamin D 500-10 MG-MCG chewable tablet Chew.    Iman Richards MD   candesartan (ATACAND) 32 MG tablet  1/23/23   Iman Richards MD   cholecalciferol (VITAMIN D3) 1000 units tablet Take 2 tablets by mouth Daily.    Iman Richards MD   clobetasol (Temovate) 0.05 % cream Apply 1 application topically to the appropriate area as directed 2 (Two) Times a Day. 1/26/23   Lucy Gaines APRN   colestipol (COLESTID) 1 g tablet  6/1/22   Iman Richards MD   diphenoxylate-atropine (LOMOTIL) 2.5-0.025 MG per tablet Take 1 tablet by mouth 4 (Four) Times a Day As Needed for Diarrhea.    Iman Richards MD   Ergocalciferol 50 MCG (2000 UT) capsule Take  by mouth.    Iman Richards MD   erythromycin (ROMYCIN) 5 MG/GM ophthalmic ointment  7/14/23   Iman Richards MD   esomeprazole (nexIUM) 40 MG capsule Take 1 capsule by mouth Every Morning Before Breakfast.    Iman Richards MD   indapamide (LOZOL) 2.5 MG tablet Take 1 tablet by mouth Daily.    Iman Richards MD   levothyroxine (SYNTHROID, LEVOTHROID) 88 MCG tablet  1/18/23   Iman Richards MD   ondansetron (ZOFRAN) 4 MG tablet  4/25/19   Iman Richards MD   pravastatin (PRAVACHOL) 10 MG tablet Take 1 tablet by mouth Daily.    Iman Richards MD   propranolol LA (INDERAL LA) 120 MG 24 hr capsule Take 1 capsule by mouth Daily. 9/18/23   Iman Richards MD   propranolol XL (INNOPRAN XL) 120 MG 24 hr capsule Take 1 capsule  "by mouth Daily.  Patient not taking: Reported on 10/3/2023 1/23/23   ProviderIman MD   sertraline (ZOLOFT) 100 MG tablet Take 1 tablet by mouth Daily.    Provider, MD Iman     I have utilized all available immediate resources to obtain, update, or review the patient's current medications (including all prescriptions, over-the-counter products, herbals, cannabis/cannabidiol products, and vitamin/mineral/dietary (nutritional) supplements).    Objective     Vital Signs: /98   Pulse 70   Temp 97.8 °F (36.6 °C) (Temporal)   Resp 16   Ht 160 cm (63\")   Wt 91.2 kg (201 lb)   LMP  (LMP Unknown)   SpO2 94%   BMI 35.61 kg/m²   Physical Exam  Constitutional:       Appearance: She is ill-appearing.   HENT:      Head:      Comments: Large laceration noted on her forehead.  See picture below.  Cardiovascular:      Rate and Rhythm: Normal rate and regular rhythm.      Pulses: Normal pulses.      Heart sounds: Normal heart sounds. No murmur heard.  Pulmonary:      Effort: Pulmonary effort is normal. No respiratory distress.      Breath sounds: Normal breath sounds. No wheezing or rales.   Abdominal:      General: Bowel sounds are normal. There is no distension.      Palpations: Abdomen is soft.      Tenderness: There is no abdominal tenderness. There is no guarding.   Musculoskeletal:      Right lower leg: No edema.      Left lower leg: No edema.   Skin:     General: Skin is warm.   Neurological:      General: No focal deficit present.      Mental Status: She is alert and oriented to person, place, and time.              Results Reviewed:  Lab Results (last 24 hours)       Procedure Component Value Units Date/Time    High Sensitivity Troponin T 1Hr [[id number]]  (Normal) Collected: 05/05/25 0333    Specimen: Blood Updated: 05/05/25 0437     HS Troponin T 11 ng/L      Troponin T Numeric Delta 0 ng/L     Narrative:      High Sensitive Troponin T Reference Range:  <14.0 ng/L- Negative Female for AMI  <22.0 " ng/L- Negative Male for AMI  >=14 - Abnormal Female indicating possible myocardial injury.  >=22 - Abnormal Male indicating possible myocardial injury.   Clinicians would have to utilize clinical acumen, EKG, Troponin, and serial changes to determine if it is an Acute Myocardial Infarction or myocardial injury due to an underlying chronic condition.         High Sensitivity Troponin T [[id number]]  (Normal) Collected: 05/05/25 0054    Specimen: Blood from Arm, Left Updated: 05/05/25 0230     HS Troponin T 11 ng/L     Narrative:      High Sensitive Troponin T Reference Range:  <14.0 ng/L- Negative Female for AMI  <22.0 ng/L- Negative Male for AMI  >=14 - Abnormal Female indicating possible myocardial injury.  >=22 - Abnormal Male indicating possible myocardial injury.   Clinicians would have to utilize clinical acumen, EKG, Troponin, and serial changes to determine if it is an Acute Myocardial Infarction or myocardial injury due to an underlying chronic condition.         Basic Metabolic Panel [[id number]]  (Abnormal) Collected: 05/05/25 0054    Specimen: Blood from Arm, Left Updated: 05/05/25 0134     Glucose 114 mg/dL      BUN 33 mg/dL      Creatinine 1.06 mg/dL      Sodium 135 mmol/L      Potassium 4.6 mmol/L      Comment: Slight hemolysis detected by analyzer. Result may be falsely elevated.        Chloride 98 mmol/L      CO2 25.0 mmol/L      Calcium 9.2 mg/dL      BUN/Creatinine Ratio 31.1     Anion Gap 12.0 mmol/L      eGFR 53.2 mL/min/1.73     Narrative:      GFR Categories in Chronic Kidney Disease (CKD)              GFR Category          GFR (mL/min/1.73)    Interpretation  G1                    90 or greater        Normal or high (1)  G2                    60-89                Mild decrease (1)  G3a                   45-59                Mild to moderate decrease  G3b                   30-44                Moderate to severe decrease  G4                    15-29                Severe decrease  G5                     14 or less           Kidney failure    (1)In the absence of evidence of kidney disease, neither GFR category G1 or G2 fulfill the criteria for CKD.    eGFR calculation 2021 CKD-EPI creatinine equation, which does not include race as a factor    CBC & Differential [[id number]]  (Abnormal) Collected: 05/05/25 0054    Specimen: Blood from Arm, Left Updated: 05/05/25 0118    Narrative:      The following orders were created for panel order CBC & Differential.  Procedure                               Abnormality         Status                     ---------                               -----------         ------                     CBC Auto Differential[[id number]]        Abnormal            Final result                 Please view results for these tests on the individual orders.    CBC Auto Differential [[id number]]  (Abnormal) Collected: 05/05/25 0054    Specimen: Blood from Arm, Left Updated: 05/05/25 0118     WBC 9.70 10*3/mm3      RBC 3.92 10*6/mm3      Hemoglobin 11.6 g/dL      Hematocrit 36.0 %      MCV 91.8 fL      MCH 29.6 pg      MCHC 32.2 g/dL      RDW 13.6 %      RDW-SD 45.5 fl      MPV 10.6 fL      Platelets 251 10*3/mm3      Neutrophil % 76.2 %      Lymphocyte % 12.4 %      Monocyte % 8.0 %      Eosinophil % 1.6 %      Basophil % 0.6 %      Immature Grans % 1.2 %      Neutrophils, Absolute 7.38 10*3/mm3      Lymphocytes, Absolute 1.20 10*3/mm3      Monocytes, Absolute 0.78 10*3/mm3      Eosinophils, Absolute 0.16 10*3/mm3      Basophils, Absolute 0.06 10*3/mm3      Immature Grans, Absolute 0.12 10*3/mm3      nRBC 0.0 /100 WBC           Imaging Results (Last 24 Hours)       Procedure Component Value Units Date/Time    CT Head Without Contrast [[id number]] Resulted: 05/05/25 0106     Updated: 05/05/25 0120    CT Cervical Spine Without Contrast [[id number]] Resulted: 05/05/25 0106     Updated: 05/05/25 0120    CT Facial Bones Without Contrast [[id number]] Resulted: 05/05/25 0106     Updated: 05/05/25 0120     CT Chest Without Contrast Diagnostic [[id number]] Resulted: 05/05/25 0106     Updated: 05/05/25 0120          I have personally reviewed and interpreted the radiology studies and ECG obtained at time of admission.     Assessment / Plan   Assessment:   Active Hospital Problems    Diagnosis     **Sternal fracture        Treatment Plan  The patient will be admitted to my service here at [facility].     Assessment and plan:  #Fall.  #Head trauma and facial laceration.  #Possible Sternal Fracture  #Balance disturbance.    - Admitting to floor as observation.  - ED contacted plastic surgery, who will perform repair.  - Patient received a dose of antibiotic empirically.  Day team to follow.  - Patient received a dose of tetanus.  - Pain medication ordered  - Will keep patient n.p.o.  for the OR.  - PT and OT ordered for balance problems.  - Fall precautions ordered.  - DVT prophylaxis with SCDs for now.    To note the CT facial stat/rad did not mention any acute fracture, however the final report this morning 6:21AM suggesting a linear bony fragment in the right hemimandible that could represent calcification or fracture. However, patient was not symptomatic in that area. Day team to re-assess, and if symptoms start to occur, they may consider MR imaging as suggested in the CT report.        Medical Decision Making  Number and Complexity of problems: 2 acute and moderate  Differential Diagnosis: As above    Conditions and Status        Condition is worsening.     [facility] Data  External documents reviewed: Yes  Cardiac tracing (EKG, telemetry) interpretation: Yes  Radiology interpretation: Yes  Labs reviewed: Yes  Any tests that were considered but not ordered: No     Decision rules/scores evaluated (example [id number], Wells, etc): No     Discussed with: Patient and ED provider     Care Planning  Shared decision making: Discussed the plan with the patient and she was agreeable   Code status and discussions:  discussed the CODE STATUS with the patient and she wants to be DNI DNR    Disposition  Social Determinants of Health that impact treatment or disposition: Not at the moment  Estimated length of stay is 1 to 2 days.     I confirmed that the patient's advanced care plan is present, code status is documented, and a surrogate decision maker is listed in the patient's medical record.       The patient was seen and examined by me on 5/5 at 348.    Electronically signed by Mayda Donahue MD, 05/05/25, 05:56 CDT.

## 2025-05-05 NOTE — DISCHARGE SUMMARY
[facility] Medicine Services  DISCHARGE SUMMARY       Date of Admission: 5/5/2025  Date of Discharge:  5/5/2025  Primary Care Physician: Deann Ocampo MD    Presenting Problem/History of Present Illness:  Fall/laceration of forehead    Final Discharge Diagnoses:  Active Hospital Problems    Diagnosis     **Sternal fracture        Consults: Plastic surgery    Procedures Performed:   Repair of right brow, glabellar, and forehead laceration, complex, 10 cm     Pertinent Test Results:       Imaging Results (All)       Procedure Component Value Units Date/Time    CT Cervical Spine Without Contrast [[id number]] Collected: 05/05/25 0914     Updated: 05/05/25 1012    Narrative:      EXAMINATION: CT CERVICAL SPINE WO CONTRAST-      5/5/2025 12:06 AM     HISTORY: Fall; S01.81XA-Laceration without foreign body of other part of  head, initial encounter; W19.XXXA-Unspecified fall, initial encounter;  N18.9-Chronic kidney disease, unspecified; S20.219A-Contusion of  unspecified front wall of thorax, initial encounter;  S22.20XA-Unspecified fracture of sternum, initial encounter for closed  fracture; N28.1-Cyst of kidney, acquired     In order to have a CT radiation dose as low as reasonably achievable  Automated Exposure Control was utilized for adjustment of the mA and/or  KV according to patient size.     Total DLP = 1324.96 mGy.cm     CT scan of the cervical spine is performed without intravenous or  intrathecal contrast enhancement.     The images are acquired in axial plane with subsequent reconstruction in  coronal and sagittal planes.     The comparison is made with the previous study dated 3/5/2019.     There is no evidence of an acute fracture or compression.     No acute displacement or malalignment.     There is evidence of a mild dextroscoliosis.     There is evidence of a mild anterolisthesis of C3 over C4 and C4 over  C5.     Vertebral body heights are normal.      Severe arthropathy of the atlantodental articulation seen. There is  marked narrowing and obliteration of the atlantodental space.     There is severe chronic degenerative changes of the cervical spine in  the form of anterior and posterior osteophytes, loss of disc height  representing degenerative disc disease and facet arthropathy. There is a  resultant moderate left neural foraminal stenosis at C3-4. The remaining  neural foramina are patent. There is mild spinal stenosis at C5-6. The  remaining spinal canal is patent.     Limited visualized prevertebral soft tissues are unremarkable except for  an incompletely visualized low density mass in the right lobe of the  thyroid gland. Limited visualized lung apices are unremarkable.       Impression:      1. No acute fracture or malalignment.  2. Severe cervical spondylosis. Loss of cervical lordosis. Multilevel  alignment disruption. Mild dextroscoliosis.     The above study was initially reviewed and reported by [employer]. I do not  find any discrepancies..                                            This report was signed and finalized on 5/5/2025 10:09 AM by Dr. Angelina Martinez MD.       CT Chest Without Contrast Diagnostic [[id number]] Collected: 05/05/25 0626     Updated: 05/05/25 0916    Narrative:      EXAMINATION: CT CHEST WO CONTRAST DIAGNOSTIC-      5/5/2025 12:06 AM     HISTORY: Fall with chest wall contusion; S01.81XA-Laceration without  foreign body of other part of head, initial encounter;  W19.XXXA-Unspecified fall, initial encounter; N18.9-Chronic kidney  disease, unspecified; S20.219A-Contusion of unspecified front wall of  thorax, initial encounter; S22.20XA-Unspecified fracture of sternum,  initial encounter for closed fracture; N28.1-Cyst of kidney, acquired     In order to have a CT radiation dose as low as reasonably achievable  Automated Exposure Control was utilized for adjustment of the mA and/or  KV according to patient size.     Total DLP  = 316.69 mGy.cm     CT scan of the chest is performed without intravenous contrast  enhancement.     Images are acquired in axial plane with subsequent reconstruction in  coronal and sagittal planes.     There is no previous similar study for comparison.     The lungs are moderately well-expanded.     There are atelectatic changes in the lower lungs, left more than the  right.     Mild bronchiectatic changes are seen in the lower lungs, left more than  the right.     A band shaped area of increased density in the right lower lung  represent an area of discoid atelectasis.     No discrete mass is seen.     Central airways clear and patent.     Limited visualized unenhanced soft tissues of the neck show large  low-density mass to the right of the trachea measuring 3.8 x 4.4 cm.  This may represent right thyroid cyst?.     No axillary lymphadenopathy.     Atheromatous change of thoracic aorta. No aneurysmal dilatation.     Atheromatous change of the coronary arteries.     Normal size central pulmonary arteries are seen.     No evidence of mediastinal lymphadenopathy.     There is a large paraesophageal and sliding hiatal hernia containing the  entire stomach.     Limited included abdomen is unremarkable except for a low-density nodule  located medially in the segment 6 of the liver measuring 2 cm in  diameter. CT density suggest a cyst. Another tiny nodule located more  laterally in the segment 6 measures 6 mm in greatest dimension. This is  too small to be further characterized. Spleen appears normal.     Images reviewed in bone window show chronic degenerative changes of the  thoracic and limited visualized lumbar spine. There is buckling of the  anterior cortex of the proximal sternum which may represent a buckle  fracture of the sternum.       Impression:      1. A focal deformity of the anterior margin of the proximal sternum may  represent a cortical fracture of the sternum. This may be clinically  correlated. No  other displaced fracture.  2. The lungs are unremarkable except for atelectatic changes and  bronchiectasis.  3. A large paraesophageal and sliding hiatal hernia/intrathoracic  stomach.  4. Low-density nodule within the liver probably represent cysts. However  these are incompletely evaluated due to lack of contrast enhancement.  5. Low density mass in the right lobe of the thyroid gland which is  incompletely visualized and evaluated. Further correlation with  sonography may be obtained.     The above study was initially reviewed and reported by [employer]. I do not  find any discrepancies.                                            This report was signed and finalized on 5/5/2025 9:13 AM by Dr. Angelina Martinez MD.       CT Facial Bones Without Contrast [[id number]] Collected: 05/05/25 0611     Updated: 05/05/25 0624    Narrative:      EXAMINATION: CT FACIAL BONES WO CONTRAST-      5/5/2025 12:06 AM     HISTORY: Fall with facial trauma; S01.81XA-Laceration without foreign  body of other part of head, initial encounter; W19.XXXA-Unspecified  fall, initial encounter; N18.9-Chronic kidney disease, unspecified;  S20.219A-Contusion of unspecified front wall of thorax, initial  encounter; S22.20XA-Unspecified fracture of sternum, initial encounter  for closed fracture; N28.1-Cyst of kidney, acquired     In order to have a CT radiation dose as low as reasonably achievable  Automated Exposure Control was utilized for adjustment of the mA and/or  KV according to patient size.     Total DLP = 1324.96 mGy.cm     The CT scan of the facial bones is performed without intravenous  contrast enhancement.     Images are acquired in axial plane and subsequent reconstruction in  coronal and sagittal planes.     Correlation made with previous study dated 3/15/2019.     There is a linear bony fragment adjacent and anterior to the left  neck/subcortical area of the right hemimandible. This was not seen in  the previous study in 2019.  There is severe bony erosive changes of the  right temporomandibular joint. This probably represent a chronic  process/erosive osteoarthritis. The bony fragment may represent a  displaced osteochondral fracture or a soft tissue calcification.     The remaining facial bones appear unremarkable. No evidence of a  fracture.     Orbits bilaterally appear normal. No fracture.     The remaining maxilla and mandible appear normal. No acute displaced  fracture.     The zygomatic arches bilaterally appear normal.     The nasal bones and anterior nasal spine appear normal.     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 No acute fracture or dislocation.  2. 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 A linear bony fragment adjacent and anterior to the neck of the  right hemimandible/Sub condylar region was not seen in the previous  study and may represent soft tissue calcification or a displaced  osteochondral fracture of the mandibular condyle. This appears to  represent a chronic process. If symptomatic, further evaluation MR  imaging of the right hemimandible including the temporomandibular joint  may be obtained.     The above study was initially reviewed and reported by [employer]. The  above-mentioned discrepancy was reported to to ER physician at 6:21 a.m.                                      This report was signed and finalized on 5/5/2025 6:21 AM by Dr. Angelina Martinez MD.       CT Head Without Contrast [[id number]] Collected: 05/05/25 0559     Updated: 05/05/25 0607    Narrative:      EXAMINATION: CT HEAD WO CONTRAST-      5/5/2025 12:06 AM     HISTORY: Fall; S01.81XA-Laceration without foreign body of other part of  head, initial encounter; W19.XXXA-Unspecified fall, initial encounter;  N18.9-Chronic kidney disease, unspecified; S20.219A-Contusion  of  unspecified front wall of thorax, initial encounter;  S22.20XA-Unspecified fracture of sternum, initial encounter for closed  fracture; N28.1-Cyst of kidney, acquired     In order to have a CT radiation dose as low as reasonably achievable  Automated Exposure Control was utilized for adjustment of the mA and/or  KV according to patient size.     Total DLP = 1324.96 mGy.cm     The CT scan of the head is performed without intravenous contrast  enhancement.     The images are acquired in axial plane and subsequent reconstruction in  coronal and sagittal planes.     The comparison is made with the previous study dated 3/25/2029.     There is no evidence of a mass. There is no midline shift.     There is no evidence of intracranial hemorrhage or hematoma. Moderately  prominent ventricles, basal cisterns and cortical sulci are similar to  the previous study suggesting mild to moderate chronic volume loss.     Left basal ganglia calcifications similar to the previous study.     Scattered areas of chronic white matter ischemia bilaterally are similar  to the previous study. The gray-white matter differentiation is  maintained.     Posterior fossa structures appear normal as visualized. Images reviewed  in bone window show no evidence of a skull fracture. There is soft  tissue swelling and laceration involving the frontal scalp/forehead are  noted. No underlying bony abnormality. There is complete opacification  of the right sphenoid sinus with intrinsic calcification. Moderate  mucosal thickening of the left sphenoid sinus seen. Mastoid air cells  are clear.       Impression:      1. No acute intracranial abnormality.  2. No evidence of skull fracture.  3. Chronic ischemic and atrophic changes.  4. Chronic sphenoid sinus disease.     The above study was initially reviewed and reported by [employer]. I do not  find any discrepancies.                                      This report was signed and finalized on 5/5/2025 6:03  "AM by Dr. Angelina Martinez MD.             LAB RESULTS:      Lab 05/05/25  0829 05/05/25  0054   WBC 7.95 9.70   HEMOGLOBIN 10.3* 11.6*   HEMATOCRIT 32.6* 36.0   PLATELETS 182 251   NEUTROS ABS  --  7.38*   IMMATURE GRANS (ABS)  --  0.12*   LYMPHS ABS  --  1.20   MONOS ABS  --  0.78   EOS ABS  --  0.16   MCV 92.9 91.8         Lab 05/05/25  0829 05/05/25  0054   SODIUM 137 135*   POTASSIUM 4.3 4.6   CHLORIDE 102 98   CO2 26.0 25.0   ANION GAP 9.0 12.0   BUN 28* 33*   CREATININE 0.99 1.06*   EGFR 57.8* 53.2*   GLUCOSE 102* 114*   CALCIUM 8.7 9.2             Lab 05/05/25  0333 05/05/25  0054   HSTROP T 11 11                 Brief Urine Lab Results       None          Microbiology Results (last 10 days)       ** No results found for the last 240 hours. **            Hospital Course:     - Head trauma/facial laceration secondary to fall  Plastic surgery was consulted and after evaluation performed the following procedure-  PROCEDURE:  Repair of right brow, glabellar, and forehead laceration, complex, 10 cm  Patient has been cleared by vascular surgery for discharge with the following recommendations-  Please leave the head wrap in place for 24 hours. Please encourage the patient to maintain head elevation at all times. Upon removal of the wrap, please begin applying a thin layer of antibiotic ointment to the repair twice daily. It is okay for the patient to resume showering from my perspective once the wrap is removed. She can follow-up in my clinic on Wednesday, 5/14 for suture removal.     She will be discharged on Keflex for 5 days and mupirocin to apply topically as recommended by plastic surgeon.      Physical Exam on Discharge:  /61 (BP Location: Right arm, Patient Position: Sitting) Comment: nurse notified/will recheck  Pulse 61   Temp 97.8 °F (36.6 °C) (Oral)   Resp 16   Ht 160 cm (63\")   Wt 91.2 kg (201 lb)   LMP  (LMP Unknown)   SpO2 96%   BMI 35.61 kg/m²   Physical Exam  Constitutional:       " Appearance: She is alert and awake and forehead are wrapped up  HENT:      Head:      Comments: Large laceration noted on her forehead.  See picture below.  Cardiovascular:      Rate and Rhythm: Normal rate and regular rhythm.      Pulses: Normal pulses.      Heart sounds: Normal heart sounds. No murmur heard.  Pulmonary:      Effort: Pulmonary effort is normal. No respiratory distress.      Breath sounds: Normal breath sounds. No wheezing or rales.   Abdominal:      General: Bowel sounds are normal. There is no distension.      Palpations: Abdomen is soft.      Tenderness: There is no abdominal tenderness. There is no guarding.   Musculoskeletal:      Right lower leg: No edema.      Left lower leg: No edema.   Skin:     General: Skin is warm.   Neurological:      General: No focal deficit present.      Mental Status: She is alert and oriented to person, place, and time.     Condition on Discharge: Stable    Discharge Disposition:  Home or Self Care    Discharge Medications:     Discharge Medications        New Medications        Instructions Start Date   cephalexin 250 MG capsule  Commonly known as: KEFLEX   250 mg, Oral, 4 Times Daily      mupirocin 2 % cream  Commonly known as: BACTROBAN   1 Application, Topical, 3 Times Daily             Changes to Medications        Instructions Start Date   clobetasol propionate 0.05 % cream  Commonly known as: Temovate  What changed: additional instructions   1 application , Topical, 2 Times Daily             Continue These Medications        Instructions Start Date   aspirin 81 MG chewable tablet   81 mg, Daily      Calcium Citrate-Vitamin D 500-10 MG-MCG chewable tablet   1 tablet, Daily      candesartan 32 MG tablet  Commonly known as: ATACAND   32 mg, Daily      colestipol 1 g tablet  Commonly known as: COLESTID   1 g, 2 Times Daily      diphenoxylate-atropine 2.5-0.025 MG per tablet  Commonly known as: LOMOTIL   1 tablet, Oral, 4 Times Daily PRN      esomeprazole 40 MG  capsule  Commonly known as: nexIUM   40 mg, Every Morning Before Breakfast      indapamide 2.5 MG tablet  Commonly known as: LOZOL   2.5 mg, Daily      levothyroxine 88 MCG tablet  Commonly known as: SYNTHROID, LEVOTHROID   88 mcg, Every Early Morning      ondansetron 4 MG tablet  Commonly known as: ZOFRAN   4 mg, Oral, Every 8 Hours PRN      pravastatin 10 MG tablet  Commonly known as: PRAVACHOL   10 mg, Daily      propranolol  MG 24 hr capsule  Commonly known as: INDERAL LA   1 capsule, Daily      sertraline 100 MG tablet  Commonly known as: ZOLOFT   100 mg, Daily             Stop These Medications      propranolol  MG 24 hr capsule  Commonly known as: INNOPRAN XL            Discharge Diet: Regular    Activity at Discharge: As tolerated    Follow-up Appointments:   Follow-up with plastic surgery on 5/14/2025    Test Results Pending at Discharge: No    Electronically signed by Luis A Ochoa MD, 05/05/25, 14:33 CDT.    Time: 35 minutes.

## 2025-05-05 NOTE — PLAN OF CARE
Goal Outcome Evaluation:  Plan of Care Reviewed With: patient, family           Outcome Evaluation: PT eval complete. Pt in  upright position, AOx4 with daughter at bedside. Pt reports indep at baseline and is usually very active despite this fall and facial laceration. Dressing intact and no drainage visbile during evaluation. Pt was educated on maintaining head elevated and use of moving within tube sternal precautions to prevent pain, and verbalized understanding. Pt performed bed mobility with Indep and maintained static sitting with indep. No formal AROM or strength testing but remained WFL for t/f and ambulation and tolerated multiple sit<>stand and performed with Spv. Pt ambulated to hallway 185' with rollator and tolerated with Spv and feels she is doing well. Pt left in recliner with head elevated and educated to continue mobility as tolerated. Due to pt feeling at baseline for mobility, PT to sign off. PLease reconsult with change in status or need regarding this pt care. Anticipate d/c home with assist from daughter and spouse when medically stable.    Anticipated Discharge Disposition (PT): home with assist

## 2025-05-05 NOTE — ED PROVIDER NOTES
Subjective   History of Present Illness  Patient is a 79-year-old lady who went to the bathroom lost her balance and fell forwards sustaining a large laceration to the face.  Denies any neck pain denies any loss of consciousness denies any back pain.  Denies any upper or lower extremity pain.  Was ambulating in the ED.  Not on anticoagulation.    Fall  Mechanism of injury: fall    Injury location:  Face  Facial injury location:  Forehead and nose  Incident location:  Home  Arrived directly from scene: yes    Fall:     Fall occurred:  Standing    Height of fall:  Floor level    Impact surface: Sink.    Point of impact:  Face    Entrapped after fall: no    Suspicion of alcohol use: no    Suspicion of drug use: no    Prior to arrival data:     Patient ambulatory at scene: yes      Loss of consciousness: no      Amnesic to event: no      Airway condition since incident:  Stable    Breathing condition since incident:  Stable    Circulation condition since incident:  Stable    Mental status condition since incident:  Stable    Disability condition since incident:  Stable  Associated symptoms: no abdominal pain, no back pain, no blindness, no headaches, no hearing loss, no nausea, no neck pain and no seizures    Risk factors: no anticoagulation therapy, no asthma, no diabetes and no dialysis    Facial Laceration      Review of Systems   Constitutional: Negative.    HENT: Negative.  Negative for hearing loss.    Eyes: Negative.  Negative for blindness.   Respiratory: Negative.     Cardiovascular: Negative.    Gastrointestinal: Negative.  Negative for abdominal pain and nausea.   Musculoskeletal: Negative.  Negative for back pain and neck pain.   Skin: Negative.    Neurological: Negative.  Negative for seizures and headaches.   All other systems reviewed and are negative.      Past Medical History:   Diagnosis Date    Disease of thyroid gland     Elevated lipids     Fracture, femur     Right    GERD (gastroesophageal reflux  disease)     Hiatal hernia     Hypertension     Osteoporosis        Allergies   Allergen Reactions    Codeine Shortness Of Breath       Past Surgical History:   Procedure Laterality Date    ANCHOVY PROCEDURE      BREAST BIOPSY Left 2022    benign    BREAST EXCISIONAL BIOPSY Right     Benign breast tumors    BREAST EXCISIONAL BIOPSY Right     CHOLECYSTECTOMY      COLON RESECTION      FEMUR OPEN REDUCTION INTERNAL FIXATION Right 03/26/2019    Procedure: OPEN REDUCTION AND INTERNAL FIXATION-FEMUR;  Surgeon: Tu Paredes MD;  Location: [facility] OR;  Service: Orthopedics    HAND SURGERY Right     HYSTERECTOMY         Family History   Problem Relation Age of Onset    Heart failure Father     Hypertension Father     Heart failure Mother     Hypertension Mother     Breast cancer Neg Hx     Ovarian cancer Neg Hx     Uterine cancer Neg Hx     Colon cancer Neg Hx     Melanoma Neg Hx        Social History     Socioeconomic History    Marital status:    Tobacco Use    Smoking status: Never    Smokeless tobacco: Never   Vaping Use    Vaping status: Never Used   Substance and Sexual Activity    Alcohol use: No    Drug use: No    Sexual activity: Not Currently           Objective   Physical Exam  Vitals and nursing note reviewed. Exam conducted with a chaperone present.   Constitutional:       General: She is awake. She is not in acute distress.     Appearance: Normal appearance. She is well-developed. She is not ill-appearing or toxic-appearing.   HENT:      Head: Normocephalic. No raccoon eyes, Cueva's sign, abrasion, contusion or laceration.      Jaw: There is normal jaw occlusion. No tenderness.      Comments: Large laceration extending from the frontal scalp into the nose in a V configuration which is deep.  There is no clear discharge from the nare itself.  Extraocular movements intact no entrapment noted.  No facial step-offs noted.  There is no trismus noted cervical spine is negative for any step-off lesser  tenderness thoracic spine L-spine is negative.     Right Ear: Tympanic membrane and external ear normal. No hemotympanum.      Left Ear: Tympanic membrane and external ear normal. No hemotympanum.      Nose: Nose normal.   Eyes:      General: Lids are normal.      Conjunctiva/sclera: Conjunctivae normal.      Pupils: Pupils are equal, round, and reactive to light.   Neck:      Vascular: No carotid bruit or JVD.      Trachea: Trachea and phonation normal. No tracheal deviation.   Cardiovascular:      Rate and Rhythm: Normal rate and regular rhythm.      Chest Wall: PMI is not displaced.      Pulses: Normal pulses.      Heart sounds: Normal heart sounds.   Pulmonary:      Effort: Pulmonary effort is normal. No tachypnea, accessory muscle usage or respiratory distress.      Breath sounds: Normal breath sounds. No stridor.   Chest:      Chest wall: Tenderness present. No mass, lacerations, deformity, swelling or crepitus. There is no dullness to percussion.   Abdominal:      General: Abdomen is flat. Bowel sounds are normal. There is no distension.      Palpations: Abdomen is soft. Abdomen is not rigid.      Tenderness: There is no abdominal tenderness. There is no right CVA tenderness or left CVA tenderness.   Musculoskeletal:         General: Normal range of motion.      Cervical back: Normal, full passive range of motion without pain, normal range of motion and neck supple. No rigidity, spasms, tenderness or bony tenderness. No spinous process tenderness or muscular tenderness. Normal range of motion.      Thoracic back: Normal. No spasms, tenderness or bony tenderness. Normal range of motion.      Lumbar back: Normal. No deformity, lacerations, spasms, tenderness or bony tenderness. Normal range of motion.      Right lower leg: No edema.      Left lower leg: No edema.   Skin:     General: Skin is warm and dry.      Capillary Refill: Capillary refill takes less than 2 seconds.      Findings: No abrasion, ecchymosis  or laceration.   Neurological:      General: No focal deficit present.      Mental Status: She is alert and oriented to person, place, and time.      GCS: GCS eye subscore is 4. GCS verbal subscore is 5. GCS motor subscore is 6.      Cranial Nerves: Cranial nerves 2-12 are intact. No cranial nerve deficit.      Sensory: Sensation is intact. No sensory deficit.      Motor: Motor function is intact. No weakness or atrophy.      Coordination: Coordination is intact.   Psychiatric:         Speech: Speech normal.         Behavior: Behavior normal. Behavior is cooperative.         Procedures           ED Course  ED Course as of 05/05/25 0627   Mon May 05, 2025   0148 CT of the head does not show any calvarial fractures no intracranial hemorrhage.  CT facial bones soft tissue swelling and laceration no acute fracture or dislocation  Sphenoid sinus has coarse calcification [TS]   0154 CT cervical spine is negative [TS]   0212 CT scan shows possible acute nondisplaced fracture of the anterior cortex of the mid sternum 4 cm cyst in the right thyroid gland and large hiatal hernia this has been discussed with the patient. [TS]   0230 Normal sinus rhythm [TS]   0350 Patient's case was discussed with Dr. Forde who will repair the laceration. [TS]   0351 Patient has got a nondisplaced sternal fracture with no elevated cardiac markers this needs to be observed does not require any further evaluation testing and needs pain control was given pain medication in the ED IV antibiotics for the laceration and tetanus is updated the patient will be admitted. [TS]   0627 Dr. Bustos from radiology over read CT of the facial bone as a possible lucency of the left TMJ which could be a fracture patient's physical examination does not reveal any TMJ tenderness there was no malocclusion there is no trismus I have informed  about this finding [TS]      ED Course User Index  [TS] Gavin Gary MD                                                        Medical Decision Making  Fall with facial trauma and chest wall contusion no abdominal pain    Problems Addressed:  Chronic kidney disease, unspecified CKD stage: chronic illness or injury  Closed fracture of sternum, unspecified portion of sternum, initial encounter: complicated acute illness or injury  Contusion of chest wall, unspecified laterality, initial encounter: complicated acute illness or injury  Facial laceration, initial encounter: complicated acute illness or injury  Fall, initial encounter: complicated acute illness or injury  Renal cyst: undiagnosed new problem with uncertain prognosis    Amount and/or Complexity of Data Reviewed  Labs: ordered.     Details: Labs reviewed  Radiology: ordered.     Details: Scattered  ECG/medicine tests: ordered.  Discussion of management or test interpretation with external provider(s): Discussed with plastic surgery and with the hospitalist service.    Risk  Prescription drug management.  Decision regarding hospitalization.  Risk Details: Status post fall with a large laceration of the face which is approximately 8 cm and is deep we will consult plastic surgery for repairing it.        Final diagnoses:   Facial laceration, initial encounter   Fall, initial encounter   Chronic kidney disease, unspecified CKD stage   Contusion of chest wall, unspecified laterality, initial encounter   Closed fracture of sternum, unspecified portion of sternum, initial encounter   Renal cyst       ED Disposition  ED Disposition       ED Disposition   Decision to Admit    Condition   --    Comment   Level of Care: Telemetry [5]   Diagnosis: Sternal fracture [160488]   Admitting Physician: KEEGAN MORALES [[id number]]   Attending Physician: KEEGAN MORALES [[id number]]                 Lele Forde MD  [address] [id number] [phone]    Follow up in 9 day(s)           Medication List      No changes were made to your prescriptions during this  visit.            Gavin Gary MD  05/05/25 0039       Gavin Gary MD  05/05/25 0152       Gavin Gary MD  05/05/25 0352       Gavin Gary MD  05/05/25 0627

## 2025-05-05 NOTE — H&P
[facility]   Consult Note    Patient Name: Vanessa Kamara  : 1945  MRN: [id number]  Primary Care Physician:  Deann Ocampo MD  Referring Physician: Mayda Donahue MD  Date of admission: 2025    Consults  Subjective   Subjective     Reason for Consult/ Chief Complaint: Complex facial laceration    History of Present Illness  Vanessa Kamara is a 79 y.o. female who fell from standing at home tonight and struck her face on a sink faucet.  She also struck her chest on the countertop and has an associated sternal fracture.  As a result of striking her face on the faucet, she sustained a large avulsion laceration to her forehead.  She presented to the ED where imaging ruled out intracranial processes, facial fracture, or cervical spine injury.  I was consulted by the ED provider for further evaluation and treatment of the facial laceration.  She will be admitted to the hospitalist service for observation secondary to her age and sternal fracture.    Review of Systems   Negative except as mentioned above    Personal History     Past Medical History:   Diagnosis Date    Disease of thyroid gland     Elevated lipids     Fracture, femur     Right    GERD (gastroesophageal reflux disease)     Hiatal hernia     Hypertension     Osteoporosis        Past Surgical History:   Procedure Laterality Date    ANCHOVY PROCEDURE      BREAST BIOPSY Left     benign    BREAST EXCISIONAL BIOPSY Right     Benign breast tumors    BREAST EXCISIONAL BIOPSY Right     CHOLECYSTECTOMY      COLON RESECTION      FEMUR OPEN REDUCTION INTERNAL FIXATION Right 2019    Procedure: OPEN REDUCTION AND INTERNAL FIXATION-FEMUR;  Surgeon: Tu Paredes MD;  Location: [facility];  Service: Orthopedics    HAND SURGERY Right     HYSTERECTOMY         Family History: Her family history includes Heart failure in her father and mother; Hypertension in her father and mother.     Social History: She  reports that she has  "never smoked. She has never used smokeless tobacco. She reports that she does not drink alcohol and does not use drugs.    Home Medications:  ALPRAZolam, Calcium Citrate-Vitamin D, Ergocalciferol, aspirin, candesartan, cholecalciferol, clobetasol propionate, colestipol, diphenoxylate-atropine, erythromycin, esomeprazole, indapamide, levothyroxine, ondansetron, pravastatin, propranolol LA, propranolol XL, and sertraline    Allergies:  She is allergic to codeine.    Objective    Objective     Vitals:      Temp:  [98.1 °F (36.7 °C)-98.3 °F (36.8 °C)] 98.1 °F (36.7 °C)  Heart Rate:  [63-67] 67  Resp:  [16-18] 18  BP: (149-158)/(81-85) 149/81    Physical Exam:   There is a large avulsion laceration of the brow and forehead.  Laceration spans from the medial aspect of the right eyebrow obliquely down to the nasal radix, transversely across the nasal radix and glabella, and then obliquely superiorly and toward the left side of the forehead nearly to the hairbearing frontal scalp hairline.  There is weakness of left brow elevation and brow ptosis on examination.    160 cm (63\")          05/05/25  0036   Weight: 91.2 kg (201 lb)       Result Review      Result Review:  I have personally reviewed the results from the time of this admission to 5/5/2025 04:50 CDT and agree with these findings:  []  Laboratory  []  Microbiology  [x]  Radiology  []  EKG/Telemetry   []  Cardiology/Vascular   []  Pathology  []  Old records  []  Other:    Most notable findings include:   - Radiology reports from the CT head and CT C-spine  are not available.  ED provider has informed me that he has spoken with radiology and there is no concern for intracranial hemorrhage or cervical spine injury.    - I have personally reviewed images of the CT face, and there is no evidence of  Frontal sinus or orbital fractures.    Assessment & Plan     Assessment / Plan     Brief Patient Summary:  Vanessa Kamara is a 79 y.o. female who fell from standing at home " tonight and struck her face on a sink faucet.  She also struck her chest on the countertop and has an associated sternal fracture.  As a result of striking her face on the faucet, she sustained a large avulsion laceration to her forehead.  She presented to the ED where imaging ruled out intracranial processes, facial fracture, or cervical spine injury.  I was consulted by the ED provider for further evaluation and treatment of the facial laceration.  She will be admitted to the hospitalist service for observation secondary to her age and sternal fracture.    I have discussed with the patient proceeding to the OR for repair under sedation.  I have explained that she may have permanent palsy or weakness of her left forehead.  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  She understands that she will have a permanent scar in this location.  Patient would like to proceed with repairs.  Consent has been obtained.    Active Hospital Problems:  Active Hospital Problems    Diagnosis     **Sternal fracture        VTE Prophylaxis:  No VTE prophylaxis order currently exists.    Plan:   Proceed to the OR for laceration repair.      Electronically signed by Lele Forde MD, 05/05/25, 4:50 AM CDT.

## 2025-05-05 NOTE — ANESTHESIA POSTPROCEDURE EVALUATION
"Patient: Vanessa Kamara    Procedure Summary       Date: 05/05/25 Room / Location:  [facility] OR 03 /  [facility] OR    Anesthesia Start: 0450 Anesthesia Stop: 0540    Procedure: FACIAL LACERATION REPAIR (Face) Diagnosis:     Surgeons: Lele Forde MD Provider: Ginger Wilder CRNA    Anesthesia Type: general ASA Status: 2 - Emergent            Anesthesia Type: general    Vitals  Vitals Value Taken Time   /68 05/05/25 05:42   Temp 97.8 °F (36.6 °C) 05/05/25 05:39   Pulse 73 05/05/25 05:44   Resp 16 05/05/25 05:39   SpO2 92 % 05/05/25 05:44   Vitals shown include unfiled device data.        Post Anesthesia Care and Evaluation    Patient location during evaluation: PACU  Patient participation: complete - patient participated  Level of consciousness: awake  Pain management: adequate    Airway patency: patent  Anesthetic complications: No anesthetic complications  PONV Status: none  Cardiovascular status: acceptable  Respiratory status: acceptable  Hydration status: acceptable    Comments: /68 (BP Location: Right arm, Patient Position: Lying)   Pulse 73   Temp 97.8 °F (36.6 °C) (Temporal)   Resp 16   Ht 160 cm (63\")   Wt 91.2 kg (201 lb)   LMP  (LMP Unknown)   SpO2 94%   BMI 35.61 kg/m²   Patient discharged from PACU based on Mary score. See RN notes for further details.    "

## 2025-05-05 NOTE — PROGRESS NOTES
"Pharmacy Dosing Service  Antimicrobial Dosing  Cefazolin    Assessment/Action/Plan:  Ancef 1 gm IV once for surgical prophylaxis has been adjusted to 2 gm IV once for patient weighing < 120 kg       Subjective:  Vanessa Kamara is a 79 y.o. female     Objective:  Ht: 160 cm (63\"); Wt: 91.2 kg (201 lb)  Estimated Creatinine Clearance: 45.4 mL/min (A) (by C-G formula based on SCr of 1.06 mg/dL (H)).   Creatinine   Date Value Ref Range Status   05/05/2025 1.06 (H) 0.57 - 1.00 mg/dL Final      Lab Results   Component Value Date    WBC 9.70 05/05/2025      Baseline culture results:  Microbiology Results (last 10 days)       ** No results found for the last 240 hours. **            Rigoberto Silver, PharmD  05/05/25 03:14 CDT    "
Please leave the head wrap in place for 24 hours.  Please encourage the patient to maintain head elevation at all times.  Upon removal of the wrap, please begin applying a thin layer of antibiotic ointment to the repair twice daily.  It is okay for the patient to resume showering from my perspective once the wrap is removed.  She can follow-up in my clinic on Wednesday, 5/14 for suture removal.  
no

## 2025-05-05 NOTE — THERAPY DISCHARGE NOTE
Acute Care - Occupational Therapy Discharge  [facility]    Patient Name: Vanessa Kamara  : 1945    MRN: [id number]                              Today's Date: 2025       Admit Date: 2025    Visit Dx:     ICD-10-CM ICD-9-CM   1. Facial laceration, initial encounter  S01.81XA 873.40   2. Fall, initial encounter  W19.XXXA E888.9   3. Chronic kidney disease, unspecified CKD stage  N18.9 585.9   4. Contusion of chest wall, unspecified laterality, initial encounter  S20.219A 922.1   5. Closed fracture of sternum, unspecified portion of sternum, initial encounter  S22.20XA 807.2   6. Renal cyst  N28.1 753.10     Patient Active Problem List   Diagnosis    Closed fracture of right distal femur    Closed fracture of nasal bones    H/O closed fracture of nasal bones    Acquired deviated nasal septum    Dizziness    Sternal fracture     Past Medical History:   Diagnosis Date    Disease of thyroid gland     Elevated lipids     Fracture, femur     Right    GERD (gastroesophageal reflux disease)     Hiatal hernia     Hypertension     Osteoporosis      Past Surgical History:   Procedure Laterality Date    ANCHOVY PROCEDURE      BREAST BIOPSY Left     benign    BREAST EXCISIONAL BIOPSY Right     Benign breast tumors    BREAST EXCISIONAL BIOPSY Right     CHOLECYSTECTOMY      COLON RESECTION      FEMUR OPEN REDUCTION INTERNAL FIXATION Right 2019    Procedure: OPEN REDUCTION AND INTERNAL FIXATION-FEMUR;  Surgeon: Tu Paredes MD;  Location: [facility];  Service: Orthopedics    HAND SURGERY Right     HYSTERECTOMY        General Information       Row Name 25 1054          OT Time and Intention    Subjective Information complains of;pain  -MM     Document Type discharge evaluation/summary  -MM     Mode of Treatment occupational therapy  -MM       Row Name 25 1057          General Information    Patient Profile Reviewed yes  -MM     Prior Level of Function independent:;all household  mobility;community mobility;ADL's  -MM     Existing Precautions/Restrictions sternal;other (see comments)   HOB elevated per Dr. Forde note.  -MM     Barriers to Rehab medically complex  -MM       Row Name 05/05/25 1054          Living Environment    Current Living Arrangements home  walk in shower, shower chair, grab bars, rollator, bedside table, w/c. Typically only uses rollator.  -MM     People in Home spouse  -MM       Row Name 05/05/25 1054          Home Main Entrance    Number of Stairs, Main Entrance other (see comments)  ramp  -MM       Row Name 05/05/25 1054          Stairs Within Home, Primary    Stairs, Within Home, Primary multi-level home but lives on main floor.  -MM       Row Name 05/05/25 1054          Cognition    Orientation Status (Cognition) oriented x 4  -MM       Row Name 05/05/25 1054          Safety Issues/Impairments Affecting Functional Mobility    Impairments Affecting Function (Mobility) pain  -MM               User Key  (r) = Recorded By, (t) = Taken By, (c) = Cosigned By      Initials Name Provider Type    MM Fito Aldana, OTR/L Occupational Therapist                   Mobility/ADL's       Row Name 05/05/25 1054          Bed Mobility    Bed Mobility supine-sit  -MM     Supine-Sit [place] (Bed Mobility) independent  -MM     Assistive Device (Bed Mobility) --  -MM       Row Name 05/05/25 1054          Transfers    Transfers sit-stand transfer;stand-sit transfer  -MM       Row Name 05/05/25 1054          Sit-Stand Transfer    Sit-Stand [place] (Transfers) standby assist  -MM       Row Name 05/05/25 1054          Stand-Sit Transfer    Stand-Sit [place] (Transfers) standby assist  -MM       Row Name 05/05/25 1054          Functional Mobility    Functional Mobility- Ind. Level contact guard assist;supervision required;verbal cues required  -MM     Functional Mobility- Device walker, 4-wheeled  -MM     Functional Mobility- Comment Pt walked from bed to reynolds to chair, no  LOB.  -MM       Row Name 05/05/25 1054          Activities of Daily Living    BADL Assessment/Intervention lower body dressing;feeding  -MM       Row Name 05/05/25 1054          Lower Body Dressing Assessment/Training    [place] Level (Lower Body Dressing) don;doff;minimum assist (75% patient effort);pants/bottoms;socks;maximum assist (25% patient effort);verbal cues;set up  -MM     Position (Lower Body Dressing) edge of bed sitting;unsupported standing  -MM       Row Name 05/05/25 1054          Self-Feeding Assessment/Training    [place] Level (Feeding) liquids to mouth;modified independence;set up  -MM     Position (Feeding) sitting up in bed  -MM               User Key  (r) = Recorded By, (t) = Taken By, (c) = Cosigned By      Initials Name Provider Type    MM Fito Aldana, OTR/L Occupational Therapist                   Obj/Interventions       Row Name 05/05/25 1054          Sensory Assessment (Somatosensory)    Sensory Assessment (Somatosensory) UE sensation intact  -MM       [facility] Name 05/05/25 1054          Vision Assessment/Intervention    Visual Impairment/Limitations WFL  -MM       [facility] Name 05/05/25 1054          Range of Motion Comprehensive    General Range of Motion bilateral upper extremity ROM WFL  -MM     Comment, General Range of Motion within 90 degree shoulder ROM, pt reports limited ROM in right shoulder chronically to 90 degrees.  -MM       [facility] Name 05/05/25 1054          Strength Comprehensive (MMT)    Comment, General Manual Muscle Testing (MMT) Assessment BUE  4-/5, functionally BUE otherwise 4-/5.  -MM       Row Name 05/05/25 1054          Balance    Balance Assessment sitting static balance;sitting dynamic balance;standing static balance;standing dynamic balance  -MM     Static Sitting Balance independent  -MM     Dynamic Sitting Balance standby assist  -MM     Position, Sitting Balance unsupported;sitting edge of bed;supported;sitting in chair  -MM     Static Standing  Balance supervision  -MM     Dynamic Standing Balance supervision;contact guard;verbal cues  -MM     Position/Device Used, Standing Balance supported;walker, 4-wheeled  -MM               User Key  (r) = Recorded By, (t) = Taken By, (c) = Cosigned By      Initials Name Provider Type    Fito Ziegler, OTR/L Occupational Therapist                   Goals/Plan       Row Name 05/05/25 1054          Transfer Goal 1 (OT)    Activity/Assistive Device (Transfer Goal 1, OT) --  -MM     Time Frame (Transfer Goal 1, OT) --  -MM     Progress/Outcome (Transfer Goal 1, OT) --  -MM       Row Name 05/05/25 1054          Dressing Goal 1 (OT)    Time Frame (Dressing Goal 1, OT) --  -MM     Progress/Outcome (Dressing Goal 1, OT) --  -MM       Row Name 05/05/25 1054          Toileting Goal 1 (OT)    Activity/Device (Toileting Goal 1, OT) --  -MM     Time Frame (Toileting Goal 1, OT) --  -MM     Progress/Outcome (Toileting Goal 1, OT) --  -MM       Row Name 05/05/25 1054          Therapy Assessment/Plan (OT)    Planned Therapy Interventions (OT) --  -MM               User Key  (r) = Recorded By, (t) = Taken By, (c) = Cosigned By      Initials Name Provider Type    Fito Ziegler, OTR/L Occupational Therapist                   Clinical Impression       Row Name 05/05/25 1054          Pain Assessment    Pretreatment Pain Rating 5/10  5/10 head, chest pain with movement.  -MM     Posttreatment Pain Rating 4/10  -MM     Pain Location head  -MM     Pain Management Interventions activity modification encouraged;exercise or physical activity utilized;movement retraining implemented;positioning techniques utilized  -MM     Response to Pain Interventions activity participation with tolerable pain  -MM       Row Name 05/05/25 1054          Plan of Care Review    Plan of Care Reviewed With patient  -MM     Progress no change  -MM     Outcome Evaluation OT evaluation completed. Pt is A&Ox4, agreeable to therapy. Pt reports pain in head  5/10 pre tx, 3-4/10 post tx. Pt also reports chest pain with activity, but does not rate. Pt was independent for supine to sit. Pt was min A for donning underwear and pants, max-dependent to don socks. Pt is limited d/t pain. Pt and daughter were educated on sternal precautions, ADL adatpations, AE for ADLs and Dr. Forde note reflecting to maintain HOB elevated. Pt has family support that can assist with LB dressing tasks at home. Pt has a shower chair that she can use at d/c. Pt was supervision to [facility] for sit to stand t/f and functional mobility with rollator. Pt reports she feels that she is at her baseline function and reports no concerns for d/c home. Skilled OT to sign off at this time. Recommended d/c home with assist.  -MM       Row Name 05/05/25 1054          Therapy Assessment/Plan (OT)    Patient/Family Therapy Goal Statement (OT) to go home  -MM     Criteria for Skilled Therapeutic Interventions Met (OT) no;no problems identified which require skilled intervention;does not meet criteria for skilled intervention  -MM     Therapy Frequency (OT) evaluation only  -MM     Predicted Duration of Therapy Intervention (OT) --  -MM       Row Name 05/05/25 1054          Therapy Plan Review/Discharge Plan (OT)    Anticipated Discharge Disposition (OT) home with assist  -MM       Row Name 05/05/25 1054          Vital Signs    Pretreatment Heart Rate (beats/min) 64  -MM     Pre SpO2 (%) 93  -MM     O2 Delivery Pre Treatment room air  -MM     O2 Delivery Intra Treatment room air  -MM     Post SpO2 (%) --  [facility]  -MM     O2 Delivery Post Treatment room air  -MM     Pre Patient Position --  fowlers  -MM     Intra Patient Position Standing  -MM     Post Patient Position Sitting  -MM       Row Name 05/05/25 1054          Positioning and Restraints    Pre-Treatment Position in bed  -MM     Post Treatment Position chair  -MM     In Chair notified nsg;reclined;call light within  reach;encouraged to call for assist;with family/caregiver  -MM               User Key  (r) = Recorded By, (t) = Taken By, (c) = Cosigned By      Initials Name Provider Type    Fito Ziegler, OTR/L Occupational Therapist                   Outcome Measures       Row Name 05/05/25 1054          How much help from another is currently needed...    Putting on and taking off regular lower body clothing? 2  -MM     Bathing (including washing, rinsing, and drying) 3  -MM     Toileting (which includes using toilet bed pan or urinal) 3  -MM     Putting on and taking off regular upper body clothing 3  -MM     Taking care of personal grooming (such as brushing teeth) 4  -MM     Eating meals 4  -MM     AM-PAC 6 Clicks Score (OT) 19  -MM       Row Name 05/05/25 1018 05/05/25 0617       How much help from another person do you currently need...    Turning from your back to your side while in flat bed without using bedrails? 2  -MR 2  -KK    Moving from lying on back to sitting on the side of a flat bed without bedrails? 2  -MR 2  -KK    Moving to and from a bed to a chair (including a wheelchair)? 2  -MR 2  -KK    Standing up from a chair using your arms (e.g., wheelchair, bedside chair)? 2  -MR 2  -KK    Climbing 3-5 steps with a railing? 2  -MR 2  -KK    To walk in hospital room? 2  -MR 2  -KK    AM-PAC 6 Clicks Score (PT) 12  -MR 12  -KK      Row Name 05/05/25 1054          Functional Assessment    Outcome Measure Options AM-PAC 6 Clicks Daily Activity (OT)  -MM               User Key  (r) = Recorded By, (t) = Taken By, (c) = Cosigned By      Initials Name Provider Type    Fito Ziegler, OTR/L Occupational Therapist    Dre Vasques, RN Registered Nurse    Kathie Raymond RN Registered Nurse                  Occupational Therapy Education       Title: PT OT SLP Therapies (In Progress)       Topic: Occupational Therapy (In Progress)       Point: ADL training (Done)       Learning Progress Summary             Patient Acceptance, E, VU,NR by MM at 5/5/2025 1100   Family Acceptance, E, VU,NR by MM at 5/5/2025 1100                      Point: Precautions (Done)       Learning Progress Summary            Patient Acceptance, E, VU,NR by MM at 5/5/2025 1100   Family Acceptance, E, VU,NR by MM at 5/5/2025 1100                      Point: Body mechanics (Done)       Learning Progress Summary            Patient Acceptance, E, VU,NR by MM at 5/5/2025 1100   Family Acceptance, E, VU,NR by MM at 5/5/2025 1100                                      User Key       Initials Effective Dates Name Provider Type Discipline     07/11/23 -  Fito Aldana E, OTR/L Occupational Therapist OT                  OT Recommendation and Plan  Therapy Frequency (OT): evaluation only  Plan of Care Review  Plan of Care Reviewed With: patient  Progress: no change  Outcome Evaluation: OT evaluation completed. Pt is A&Ox4, agreeable to therapy. Pt reports pain in head 5/10 pre tx, 3-4/10 post tx. Pt also reports chest pain with activity, but does not rate. Pt was independent for supine to sit. Pt was min A for donning underwear and pants, max-dependent to don socks. Pt is limited d/t pain. Pt and daughter were educated on sternal precautions, ADL adatpations, AE for ADLs and Dr. Forde note reflecting to maintain HOB elevated. Pt has family support that can assist with LB dressing tasks at home. Pt has a shower chair that she can use at d/c. Pt was supervision to [facility] for sit to stand t/f and functional mobility with rollator. Pt reports she feels that she is at her baseline function and reports no concerns for d/c home. Skilled OT to sign off at this time. Recommended d/c home with assist.  Plan of Care Reviewed With: patient  Outcome Evaluation: OT evaluation completed. Pt is A&Ox4, agreeable to therapy. Pt reports pain in head 5/10 pre tx, 3-4/10 post tx. Pt also reports chest pain with activity, but does not rate. Pt was independent for supine to  sit. Pt was min A for donning underwear and pants, max-dependent to don socks. Pt is limited d/t pain. Pt and daughter were educated on sternal precautions, ADL adatpations, AE for ADLs and Dr. Forde note reflecting to maintain HOB elevated. Pt has family support that can assist with LB dressing tasks at home. Pt has a shower chair that she can use at d/c. Pt was supervision to [facility] for sit to stand t/f and functional mobility with rollator. Pt reports she feels that she is at her baseline function and reports no concerns for d/c home. Skilled OT to sign off at this time. Recommended d/c home with assist.     Time Calculation:         Time Calculation- OT       Row Name 05/05/25 1054             Time Calculation- OT    OT Start Time 1054  +4 minutes chart review  -MM      OT Stop Time 1144  -MM      OT Time Calculation (min) 50 min  -MM      OT Received On 05/05/25  -MM                User Key  (r) = Recorded By, (t) = Taken By, (c) = Cosigned By      Initials Name Provider Type    Fito Ziegler, OTR/L Occupational Therapist                  Therapy Charges for Today       Code Description Service Date Service Provider Modifiers Qty    [id number]  OT EVAL LOW COMPLEXITY 4 5/5/2025 Fito Aldana OTR/L GO 1               OT Discharge Summary  Anticipated Discharge Disposition (OT): home with assist  Reason for Discharge: Maximum functional potential achieved, At baseline function  Outcomes Achieved: Refer to plan of care for updates on goals achieved  Discharge Destination: Home with assist    MIKHAIL Echols/ISH  5/5/2025

## 2025-05-05 NOTE — CASE MANAGEMENT/SOCIAL WORK
Discharge Planning Assessment   [place]     Patient Name: Vanessa Kamara  MRN: [id number]  Today's Date: 5/5/2025    Admit Date: 5/5/2025        Discharge Needs Assessment       Row Name 05/05/25 1101       Living Environment    People in Home spouse    Current Living Arrangements home    Potentially Unsafe Housing Conditions none    In the past 12 months has the electric, gas, oil, or water company threatened to shut off services in your home? No    Primary Care Provided by self    Provides Primary Care For no one    Family Caregiver if Needed child(paco), adult;spouse    Quality of Family Relationships helpful;involved;supportive    Able to Return to Prior Arrangements yes       Resource/Environmental Concerns    Resource/Environmental Concerns none    Transportation Concerns none       Transportation Needs    In the past 12 months, has lack of transportation kept you from medical appointments or from getting medications? no    In the past 12 months, has lack of transportation kept you from meetings, work, or from getting things needed for daily living? No       Food Insecurity    Within the past 12 months, you worried that your food would run out before you got the money to buy more. Never true    Within the past 12 months, the food you bought just didn't last and you didn't have money to get more. Never true       Transition Planning    Patient/Family Anticipates Transition to home;home with family    Patient/Family Anticipated Services at Transition none    Transportation Anticipated car, drives self;family or friend will provide       Discharge Needs Assessment    Readmission Within the Last 30 Days no previous admission in last 30 days    Equipment Currently Used at Home walker, rolling    Concerns to be Addressed no discharge needs identified    Do you want help finding or keeping work or a job? I do not need or want help    Do you want help with school or training? For example, starting or completing job  training or getting a high school diploma, GED or equivalent No    Anticipated Changes Related to Illness none    Equipment Needed After Discharge none    Provided Post Acute Provider List? N/A    Provided Post Acute Provider Quality & Resource List? N/A    Discharge Coordination/Progress Lives at home with her . Daughter lives across the street and is available to assist when needed. Drives self but  or daughter can provide transportation if need be. Has PCP and Rx coverage. No D/C needs identified at this time.                   Discharge Plan    No documentation.                      Demographic Summary    No documentation.                  Functional Status    No documentation.                  Psychosocial    No documentation.                  Abuse/Neglect    No documentation.                  Legal    No documentation.                  Substance Abuse    No documentation.                  Patient Forms    No documentation.                     Himanshu Palomino RN

## 2025-05-05 NOTE — PLAN OF CARE
Goal Outcome Evaluation:  Plan of Care Reviewed With: patient        Progress: no change  Outcome Evaluation: OT evaluation completed. Pt is A&Ox4, agreeable to therapy. Pt reports pain in head 5/10 pre tx, 3-4/10 post tx. Pt also reports chest pain with activity, but does not rate. Pt was independent for supine to sit. Pt was min A for donning underwear and pants, max-dependent to don socks. Pt is limited d/t pain. Pt and daughter were educated on sternal precautions, ADL adatpations, AE for ADLs and Dr. Forde note reflecting to maintain HOB elevated. Pt has family support that can assist with LB dressing tasks at home. Pt has a shower chair that she can use at d/c. Pt was supervision to [facility] for sit to stand t/f and functional mobility with rollator. Pt reports she feels that she is at her baseline function and reports no concerns for d/c home. Skilled OT to sign off at this time. Recommended d/c home with assist.    Anticipated Discharge Disposition (OT): home with assist

## 2025-05-05 NOTE — OP NOTE
FACIAL LACERATION REPAIR  Procedure Report    Patient Name:  Vanessa Kamara  YOB: 1945    Date of Surgery:  5/5/2025    Attending Surgeon: Lele Forde MD     PROCEDURE:  Repair of right brow, glabellar, and forehead laceration, complex, 10 cm    PREOPERATIVE DIAGNOSIS:  Avulsion laceration of the brow, glabella, and forehead    POSTOPERATIVE DIAGNOSIS:  Same    ANESTHESIA:  MAC with local    INDICATION FOR PROCEDURE:  79-year-old female who sustained a large avulsion laceration during the course of a fall from standing.  We will proceed with repair under MAC as planned.    OPERATIVE FINDINGS:  Left supratrochlear  artery and vein were transected.  Supratrochlear nerve appeared intact.  Minimal injury to the frontalis.  Avulsion was primarily in the prefrontalis plane.  Muscle belly injury to left  was not repaired.    DESCRIPTION OF PROCEDURE:  I began by exploring the laceration under loupe magnification.  Findings as described above.  I then thoroughly irrigated with normal saline prior to proceeding with repair.  I began the repair by sharply excising tattered dermal margins along the glabellar and forehead region of the injury.  I then performed a multilayered repair with interrupted 5-0 Vicryl in the deep dermis and subcutaneous fat followed by running 5-0 nylon.  The site was dressed with antibiotic ointment and a gently compressive gauze wrap.    BLOOD LOSS:   5 mL    COMPLICATIONS:   None    SPECIMENS:          None    DISPOSITION:   Return to floor    STAFF:  Surgeon(s):  Lele Forde MD    Assistant: Faustina Jesus      Electronically signed by Lele Forde MD, 05/05/25, 5:35 AM CDT.

## 2025-05-05 NOTE — THERAPY DISCHARGE NOTE
Patient Name: Vanessa Kamara  : 1945    MRN: [id number]                              Today's Date: 2025       Admit Date: 2025    Visit Dx:     ICD-10-CM ICD-9-CM   1. Facial laceration, initial encounter  S01.81XA 873.40   2. Fall, initial encounter  W19.XXXA E888.9   3. Chronic kidney disease, unspecified CKD stage  N18.9 585.9   4. Contusion of chest wall, unspecified laterality, initial encounter  S20.219A 922.1   5. Closed fracture of sternum, unspecified portion of sternum, initial encounter  S22.20XA 807.2   6. Renal cyst  N28.1 753.10   7. Impaired mobility [Z74.09]  Z74.09 799.89     Patient Active Problem List   Diagnosis    Closed fracture of right distal femur    Closed fracture of nasal bones    H/O closed fracture of nasal bones    Acquired deviated nasal septum    Dizziness    Sternal fracture     Past Medical History:   Diagnosis Date    Disease of thyroid gland     Elevated lipids     Fracture, femur     Right    GERD (gastroesophageal reflux disease)     Hiatal hernia     Hypertension     Osteoporosis      Past Surgical History:   Procedure Laterality Date    ANCHOVY PROCEDURE      BREAST BIOPSY Left     benign    BREAST EXCISIONAL BIOPSY Right     Benign breast tumors    BREAST EXCISIONAL BIOPSY Right     CHOLECYSTECTOMY      COLON RESECTION      FEMUR OPEN REDUCTION INTERNAL FIXATION Right 2019    Procedure: OPEN REDUCTION AND INTERNAL FIXATION-FEMUR;  Surgeon: Tu Paredes MD;  Location: [facility];  Service: Orthopedics    HAND SURGERY Right     HYSTERECTOMY        General Information       Row Name 25 9895          Physical Therapy Time and Intention    Document Type evaluation  pt presents s/p fall into counter at home resulting in face laceration and sternal fx. Pt underwent facial laceration repair and is to keep head elevated  -AJ     Mode of Treatment physical therapy  -AJ       Row Name 25 9862          General Information    Patient Profile  Reviewed yes  -AJ     Prior Level of Function independent:;community mobility;gait;all household mobility;home management;ADL's  -AJ     Existing Precautions/Restrictions sternal;other (see comments)   HOB elevated per notes, treated with move within tube sternal precautions due to fx  -     Barriers to Rehab medically complex  -       Row Name 05/05/25 1055          Living Environment    Current Living Arrangements home  -     People in Home spouse  -       Row Name 05/05/25 1055          Home Main Entrance    Number of Stairs, Main Entrance other (see comments)  ramp  -       Row Name 05/05/25 1055          Stairs Within Home, Primary    Number of Stairs, Within Home, Primary other (see comments)  has staitrs but does not have to use  -       Row Name 05/05/25 1055          Cognition    Orientation Status (Cognition) oriented x 4  -       Row Name 05/05/25 1055          Safety Issues/Impairments Affecting Functional Mobility    Impairments Affecting Function (Mobility) pain  -               User Key  (r) = Recorded By, (t) = Taken By, (c) = Cosigned By      Initials Name Provider Type    Asad Pavon, PT DPT Physical Therapist                   Mobility       Row Name 05/05/25 1055          Bed Mobility    Bed Mobility supine-sit  -     Supine-Sit [place] (Bed Mobility) independent  -     Assistive Device (Bed Mobility) head of bed elevated;bed rails  -       Row Name 05/05/25 1055          Bed-Chair Transfer    Bed-Chair [place] (Transfers) supervision  -     Assistive Device (Bed-Chair Transfers) walker, 4-wheeled  -       Row Name 05/05/25 1055          Sit-Stand Transfer    Sit-Stand [place]in (Transfers) standby assist;supervision  -     Assistive Device (Sit-Stand Transfers) walker, 4-wheeled  -       Row Name 05/05/25 1055          Gait/Stairs (Locomotion)    [place]in Level (Gait) standby assist;1 person to manage equipment  -     Assistive Device (Gait)  walker, 4-wheeled  -     Patient was able to Ambulate yes  -     Distance in Feet (Gait) 185  -               User Key  (r) = Recorded By, (t) = Taken By, (c) = Cosigned By      Initials Name Provider Type    Asad Pavon PT DPJOHN Physical Therapist                   Obj/Interventions       Row Name 05/05/25 1055          Range of Motion Comprehensive    General Range of Motion bilateral lower extremity ROM WFL  -       Row Name 05/05/25 1055          Strength Comprehensive (MMT)    General Manual Muscle Testing (MMT) Assessment no strength deficits identified  -     Comment, General Manual Muscle Testing (MMT) Assessment no formal strength testing, remained WFL for t/f and gait  -       Row Name 05/05/25 1055          Balance    Balance Assessment sitting static balance;sitting dynamic balance;standing static balance;standing dynamic balance  -     Static Sitting Balance independent  -     Dynamic Sitting Balance standby assist  -     Position, Sitting Balance unsupported;sitting edge of bed;supported;sitting in chair  -     Static Standing Balance supervision;standby assist  -     Dynamic Standing Balance standby assist;supervision  -     Position/Device Used, Standing Balance supported;walker, 4-wheeled  -AJ     Balance Interventions sitting;standing;sit to stand;supported;static;dynamic  -       Row Name 05/05/25 1055          Sensory Assessment (Somatosensory)    Sensory Assessment (Somatosensory) sensation intact  -               User Key  (r) = Recorded By, (t) = Taken By, (c) = Cosigned By      Initials Name Provider Type    Asad Pavon PT DPT Physical Therapist                   Goals/Plan    No documentation.                  Clinical Impression       Row Name 05/05/25 1055          Pain    Pretreatment Pain Rating 5/10  -AJ     Posttreatment Pain Rating 4/10  -AJ     Pain Location head;chest  -     Pain Side/Orientation generalized  -     Pain Management  Interventions exercise or physical activity utilized;activity modification encouraged;nursing notified  -     Response to Pain Interventions activity participation with decreased pain  -       Row Name 05/05/25 1059          Plan of Care Review    Plan of Care Reviewed With patient;family  -     Outcome Evaluation PT eval complete. Pt in  upright position, AOx4 with daughter at bedside. Pt reports indep at baseline and is usually very active despite this fall and facial laceration. Dressing intact and no drainage visbile during evaluation. Pt was educated on maintaining head elevated and use of moving within tube sternal precautions to prevent pain, and verbalized understanding. Pt performed bed mobility with Indep and maintained static sitting with indep. No formal AROM or strength testing but remained WFL for t/f and ambulation and tolerated multiple sit<>stand and performed with Spv. Pt ambulated to hallway 185' with rollator and tolerated with Spv and feels she is doing well. Pt left in recliner with head elevated and educated to continue mobility as tolerated. Due to pt feeling at baseline for mobility, PT to sign off. PLease reconsult with change in status or need regarding this pt care. Anticipate d/c home with assist from daughter and spouse when medically stable.  -       Row Name 05/05/25 1050          Therapy Assessment/Plan (PT)    Criteria for Skilled Interventions Met (PT) no;does not meet criteria for skilled intervention;no problems identified which require skilled intervention  -     Therapy Frequency (PT) evaluation only  -       Row Name 05/05/25 7683          Positioning and Restraints    Pre-Treatment Position in bed  -     Post Treatment Position chair  -     In Chair notified nsg;encouraged to call for assist;with family/caregiver;reclined  -               User Key  (r) = Recorded By, (t) = Taken By, (c) = Cosigned By      Initials Name Provider Type    Asad Pavon, PT  DPT Physical Therapist                   Outcome Measures       Row Name 05/05/25 1055 05/05/25 1018       How much help from another person do you currently need...    Turning from your back to your side while in flat bed without using bedrails? 4  -AJ 2  -MR    Moving from lying on back to sitting on the side of a flat bed without bedrails? 4  -AJ 2  -MR    Moving to and from a bed to a chair (including a wheelchair)? 4  -AJ 2  -MR    Standing up from a chair using your arms (e.g., wheelchair, bedside chair)? 4  -AJ 2  -MR    Climbing 3-5 steps with a railing? 3  -AJ 2  -MR    To walk in hospital room? 4  -AJ 2  -MR    AM-PAC 6 Clicks Score (PT) 23  -AJ 12  -MR    Highest Level of Mobility Goal 7 --> Walk 25 feet or more  -AJ 4 --> Transfer to chair/commode  -MR      Row Name 05/05/25 0617          How much help from another person do you currently need...    Turning from your back to your side while in flat bed without using bedrails? 2  -KK     Moving from lying on back to sitting on the side of a flat bed without bedrails? 2  -KK     Moving to and from a bed to a chair (including a wheelchair)? 2  -KK     Standing up from a chair using your arms (e.g., wheelchair, bedside chair)? 2  -KK     Climbing 3-5 steps with a railing? 2  -KK     To walk in hospital room? 2  -KK     AM-PAC 6 Clicks Score (PT) 12  -KK     Highest Level of Mobility Goal 4 --> Transfer to chair/commode  -KK       Row Name 05/05/25 1055 05/05/25 1054       Functional Assessment    Outcome Measure Options AM-PAC 6 Clicks Basic Mobility (PT)  -AJ AM-PAC 6 Clicks Daily Activity (OT)  -MM              User Key  (r) = Recorded By, (t) = Taken By, (c) = Cosigned By      Initials Name Provider Type    MM Fito Aldana, OTR/L Occupational Therapist    Dre Vasques, RN Registered Nurse    Kathie Raymond, RN Registered Nurse    Asad Pavon, PT DPT Physical Therapist                  Physical Therapy Education       Title: PT OT SLP  Therapies (Done)       Topic: Physical Therapy (Done)       Point: Mobility training (Done)       Learning Progress Summary            Patient Acceptance, E, VU by KATHRYN at 5/5/2025 1421    Comment: role of PT, benefits of OOB activity, move within the tube   Family Acceptance, E, VU by AJ at 5/5/2025 1421    Comment: role of PT, benefits of OOB activity, move within the tube                      Point: Home exercise program (Done)       Learning Progress Summary            Patient Acceptance, E, VU by KATHRYN at 5/5/2025 1421    Comment: role of PT, benefits of OOB activity, move within the tube   Family Acceptance, E, VU by AJ at 5/5/2025 1421    Comment: role of PT, benefits of OOB activity, move within the tube                      Point: Body mechanics (Done)       Learning Progress Summary            Patient Acceptance, E, VU by KATHRYN at 5/5/2025 1421    Comment: role of PT, benefits of OOB activity, move within the tube   Family Acceptance, E, VU by KATHRYN at 5/5/2025 1421    Comment: role of PT, benefits of OOB activity, move within the tube                      Point: Precautions (Done)       Learning Progress Summary            Patient Acceptance, E, VU by KATHRYN at 5/5/2025 1421    Comment: role of PT, benefits of OOB activity, move within the tube   Family Acceptance, E, VU by KATHRYN at 5/5/2025 1421    Comment: role of PT, benefits of OOB activity, move within the tube                                      User Key       Initials Effective Dates Name Provider Type Discipline     08/15/24 -  Asad Rosas PT DPT Physical Therapist PT                  PT Recommendation and Plan     Outcome Evaluation: PT eval complete. Pt in  upright position, AOx4 with daughter at bedside. Pt reports indep at baseline and is usually very active despite this fall and facial laceration. Dressing intact and no drainage visbile during evaluation. Pt was educated on maintaining head elevated and use of moving within tube sternal precautions to  prevent pain, and verbalized understanding. Pt performed bed mobility with Indep and maintained static sitting with indep. No formal AROM or strength testing but remained WFL for t/f and ambulation and tolerated multiple sit<>stand and performed with Spv. Pt ambulated to hallway 185' with rollator and tolerated with Spv and feels she is doing well. Pt left in recliner with head elevated and educated to continue mobility as tolerated. Due to pt feeling at baseline for mobility, PT to sign off. PLease reconsult with change in status or need regarding this pt care. Anticipate d/c home with assist from daughter and spouse when medically stable.     Time Calculation:         PT Charges       Row Name 05/05/25 1055             Time Calculation    Start Time 1055  -AJ      Stop Time 1145  +7 for chart review and communication with OT  -KATHRYN      Time Calculation (min) 50 min  -AJ      PT Received On 05/05/25  -AJ         Untimed Charges    PT Eval/Re-eval Minutes 55  -AJ         Total Minutes    Untimed Charges Total Minutes 55  -AJ       Total Minutes 55  -AJ                User Key  (r) = Recorded By, (t) = Taken By, (c) = Cosigned By      Initials Name Provider Type    Asad Pavon PT DPT Physical Therapist                  Therapy Charges for Today       Code Description Service Date Service Provider Modifiers Qty    33959524639 HC PT EVAL LOW COMPLEXITY 4 5/5/2025 Asad Rosas PT DPT GP 1            PT G-Codes  Outcome Measure Options: AM-PAC 6 Clicks Basic Mobility (PT)  AM-PAC 6 Clicks Score (PT): 23  AM-PAC 6 Clicks Score (OT): 19    PT Discharge Summary  Anticipated Discharge Disposition (PT): home with assist    Asad Rosas PT DPT  5/5/2025

## 2025-05-05 NOTE — ANESTHESIA PREPROCEDURE EVALUATION
Anesthesia Evaluation     Patient summary reviewed and Nursing notes reviewed   no history of anesthetic complications:   NPO Solid Status: > 8 hours  NPO Liquid Status: > 8 hours           Airway   Mallampati: II  TM distance: >3 FB  Neck ROM: full  Dental      Pulmonary - negative pulmonary ROS   (-) COPD, asthma  Cardiovascular     (+) hypertension, hyperlipidemia      Neuro/Psych- negative ROS  (-) CVA  GI/Hepatic/Renal/Endo    (+) hiatal hernia, GERD    Musculoskeletal (-) negative ROS    Abdominal    Substance History - negative use     OB/GYN negative ob/gyn ROS         Other        ROS/Med Hx Other: Sternal fracture from fall              Anesthesia Plan    ASA 2 - emergent     MAC     intravenous induction     Anesthetic plan, risks, benefits, and alternatives have been provided, discussed and informed consent has been obtained with: patient.    CODE STATUS:

## 2025-05-08 RX ORDER — PRAVASTATIN SODIUM 10 MG
10 TABLET ORAL DAILY
Qty: 30 TABLET | Refills: 3 | Status: SHIPPED | OUTPATIENT
Start: 2025-05-08

## 2025-05-08 RX ORDER — CANDESARTAN 32 MG/1
32 TABLET ORAL DAILY
Qty: 30 TABLET | Refills: 3 | Status: SHIPPED | OUTPATIENT
Start: 2025-05-08

## 2025-05-09 LAB
QT INTERVAL: 418 MS
QTC INTERVAL: 438 MS

## 2025-05-12 ENCOUNTER — OFFICE VISIT (OUTPATIENT)
Dept: PRIMARY CARE CLINIC | Age: 80
End: 2025-05-12
Payer: MEDICARE

## 2025-05-12 VITALS
SYSTOLIC BLOOD PRESSURE: 130 MMHG | DIASTOLIC BLOOD PRESSURE: 74 MMHG | TEMPERATURE: 98.4 F | HEART RATE: 74 BPM | RESPIRATION RATE: 16 BRPM | WEIGHT: 223 LBS | HEIGHT: 63 IN | BODY MASS INDEX: 39.51 KG/M2 | OXYGEN SATURATION: 97 %

## 2025-05-12 DIAGNOSIS — R07.9 CHEST PAIN, UNSPECIFIED TYPE: ICD-10-CM

## 2025-05-12 DIAGNOSIS — S01.81XD FACIAL LACERATION, SUBSEQUENT ENCOUNTER: Primary | ICD-10-CM

## 2025-05-12 DIAGNOSIS — S22.20XD CLOSED FRACTURE OF STERNUM WITH ROUTINE HEALING, UNSPECIFIED PORTION OF STERNUM, SUBSEQUENT ENCOUNTER: ICD-10-CM
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
## 2025-05-12 DIAGNOSIS — S00.83XD TRAUMATIC ECCHYMOSIS OF FACE, SUBSEQUENT ENCOUNTER: ICD-10-CM

## 2025-05-12 PROCEDURE — 3075F SYST BP GE 130 - 139MM HG: CPT | Performed by: FAMILY MEDICINE

## 2025-05-12 PROCEDURE — G8399 PT W/DXA RESULTS DOCUMENT: HCPCS | Performed by: FAMILY MEDICINE

## 2025-05-12 PROCEDURE — 1036F TOBACCO NON-USER: CPT | Performed by: FAMILY MEDICINE

## 2025-05-12 PROCEDURE — 99213 OFFICE O/P EST LOW 20 MIN: CPT | Performed by: FAMILY MEDICINE

## 2025-05-12 PROCEDURE — 1090F PRES/ABSN URINE INCON ASSESS: CPT | Performed by: FAMILY MEDICINE

## 2025-05-12 PROCEDURE — G8417 CALC BMI ABV UP PARAM F/U: HCPCS | Performed by: FAMILY MEDICINE

## 2025-05-12 PROCEDURE — 1123F ACP DISCUSS/DSCN MKR DOCD: CPT | Performed by: FAMILY MEDICINE

## 2025-05-12 PROCEDURE — G8427 DOCREV CUR MEDS BY ELIG CLIN: HCPCS | Performed by: FAMILY MEDICINE

## 2025-05-12 PROCEDURE — 3078F DIAST BP <80 MM HG: CPT | Performed by: FAMILY MEDICINE

## 2025-05-12 PROCEDURE — 1159F MED LIST DOCD IN RCRD: CPT | Performed by: FAMILY MEDICINE

## 2025-05-12 RX ORDER — SERTRALINE HYDROCHLORIDE 100 MG/1
100 TABLET, FILM COATED ORAL DAILY
Qty: 30 TABLET | Refills: 5 | Status: SHIPPED | OUTPATIENT
Start: 2025-05-12

## 2025-05-12 RX ORDER — PROPRANOLOL HYDROCHLORIDE 120 MG/1
120 CAPSULE, EXTENDED RELEASE ORAL DAILY
Qty: 30 CAPSULE | Refills: 3 | Status: SHIPPED | OUTPATIENT
Start: 2025-05-12

## 2025-05-12 RX ORDER — LEVOTHYROXINE SODIUM 88 UG/1
TABLET ORAL
Qty: 98 TABLET | Refills: 2 | Status: SHIPPED | OUTPATIENT
Start: 2025-05-12

## 2025-05-12 RX ORDER — OXYCODONE AND ACETAMINOPHEN 5; 325 MG/1; MG/1
TABLET ORAL
COMMUNITY
Start: 2025-05-05

## 2025-05-12 RX ORDER — COLESTIPOL HYDROCHLORIDE 1 G/1
1 TABLET ORAL 2 TIMES DAILY
Qty: 60 TABLET | Refills: 3 | Status: SHIPPED | OUTPATIENT
Start: 2025-05-12

## 2025-05-12 ASSESSMENT — ENCOUNTER SYMPTOMS
EYE DISCHARGE: 0
WHEEZING: 0
ABDOMINAL PAIN: 0
DIARRHEA: 0
VOMITING: 0
COLOR CHANGE: 0
COUGH: 0
NAUSEA: 0
BACK PAIN: 0

## 2025-05-12 NOTE — PROGRESS NOTES
acute fracture or dislocation.  2. 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 A linear bony fragment adjacent and anterior to the neck of the  right hemimandible/Sub condylar region was not seen in the previous  study and may represent soft tissue calcification or a displaced  osteochondral fracture of the mandibular condyle. This appears to  represent a chronic process. If symptomatic, further evaluation MR  imaging of the right hemimandible including the temporomandibular joint  may be obtained.    The above study was initially reviewed and reported by [employer]. The  above-mentioned discrepancy was reported to to ER physician at 6:21 a.m.                          This report was signed and finalized on 5/5/2025 6:21 AM by Dr. Alphonse Deleon MD.  Narrative    EXAMINATION: CT FACIAL BONES WO CONTRAST-     5/5/2025 12:06 AM    HISTORY: Fall with facial trauma; S01.81XA-Laceration without foreign  body of other part of head, initial encounter; W19.XXXA-Unspecified  fall, initial encounter; N18.9-Chronic kidney disease, unspecified;  S20.219A-Contusion of unspecified front wall of thorax, initial  encounter; S22.20XA-Unspecified fracture of sternum, initial encounter  for closed fracture; N28.1-Cyst of kidney, acquired    In order to have a CT radiation dose as low as reasonably achievable  Automated Exposure Control was utilized for adjustment of the mA and/or  KV according to patient size.    Total DLP = 1324.96 mGy.cm    The CT scan of the facial bones is performed without intravenous  contrast enhancement.    Images are acquired in axial plane and subsequent reconstruction in  coronal and sagittal planes.    Correlation made with previous study dated 3/15/2019.    There is a linear bony fragment adjacent and anterior to the left  neck/subcortical area of the right hemimandible. This was not seen in  the previous

## 2025-05-19 ENCOUNTER — PATIENT MESSAGE (OUTPATIENT)
Dept: PRIMARY CARE CLINIC | Age: 80
End: 2025-05-19

## 2025-05-19 DIAGNOSIS — S22.20XD CLOSED FRACTURE OF STERNUM WITH ROUTINE HEALING, UNSPECIFIED PORTION OF STERNUM, SUBSEQUENT ENCOUNTER: Primary | ICD-10-CM

## 2025-05-19 RX ORDER — HYDROCODONE BITARTRATE AND ACETAMINOPHEN 5; 325 MG/1; MG/1
1 TABLET ORAL EVERY 4 HOURS PRN
Qty: 18 TABLET | Refills: 0 | Status: SHIPPED | OUTPATIENT
Start: 2025-05-19 | End: 2025-05-22

## 2025-05-23 RX ORDER — ESOMEPRAZOLE MAGNESIUM 40 MG/1
40 CAPSULE, DELAYED RELEASE ORAL
Qty: 30 CAPSULE | Refills: 5 | Status: SHIPPED | OUTPATIENT
Start: 2025-05-23

## 2025-06-10 RX ORDER — INDAPAMIDE 2.5 MG/1
2.5 TABLET ORAL DAILY
Qty: 90 TABLET | Refills: 1 | Status: SHIPPED | OUTPATIENT
Start: 2025-06-10

## 2025-07-21 ENCOUNTER — PATIENT MESSAGE (OUTPATIENT)
Dept: PRIMARY CARE CLINIC | Age: 80
End: 2025-07-21

## 2025-08-11 RX ORDER — PRAVASTATIN SODIUM 10 MG
10 TABLET ORAL DAILY
Qty: 30 TABLET | Refills: 3 | Status: SHIPPED | OUTPATIENT
Start: 2025-08-11

## 2025-08-11 RX ORDER — PROPRANOLOL HYDROCHLORIDE 120 MG/1
120 CAPSULE, EXTENDED RELEASE ORAL DAILY
Qty: 30 CAPSULE | Refills: 3 | Status: SHIPPED | OUTPATIENT
Start: 2025-08-11

## 2025-08-11 RX ORDER — CANDESARTAN 32 MG/1
32 TABLET ORAL DAILY
Qty: 30 TABLET | Refills: 3 | Status: SHIPPED | OUTPATIENT
Start: 2025-08-11

## 2025-09-04 ENCOUNTER — PATIENT MESSAGE (OUTPATIENT)
Dept: PRIMARY CARE CLINIC | Age: 80
End: 2025-09-04

## (undated) DEVICE — INTENDED FOR TISSUE SEPARATION, AND OTHER PROCEDURES THAT REQUIRE A SHARP SURGICAL BLADE TO PUNCTURE OR CUT.: Brand: BARD-PARKER ® STAINLESS STEEL BLADES

## (undated) DEVICE — SCRW CORT S/TAP 4.5X40MM
Type: IMPLANTABLE DEVICE | Status: NON-FUNCTIONAL
Removed: 2019-03-26

## (undated) DEVICE — STERILE LATEX POWDER FREE SURGICAL GLOVES WITH HYDROGEL COATING: Brand: PROTEXIS

## (undated) DEVICE — SUT VICRYL 5/0 P3 18IN UND VCP493G

## (undated) DEVICE — CABL BIPOL MEGADYNE 12FT DISP

## (undated) DEVICE — GLV SURG TRIUMPH GREEN W/ALOE PF LTX 8 STRL

## (undated) DEVICE — SUT MONOCRYL 4/0 PS2 27IN Y426H ETY426H

## (undated) DEVICE — DRAPE,EXTREMITY,89X128,STERILE: Brand: MEDLINE

## (undated) DEVICE — PK ENT HD AND NK 30

## (undated) DEVICE — SUTURE ETHLN SZ 4-0 L18IN NONABSORBABLE BLK L19MM PS-2 3/8 1667H

## (undated) DEVICE — MAJOR BSIN SETUP PK

## (undated) DEVICE — DRAPE,U/ SHT,SPLIT,PLAS,STERIL: Brand: MEDLINE

## (undated) DEVICE — SUT ETHLN 4/0 P3 18IN 699H

## (undated) DEVICE — GLOVE SURG SZ 75 L12IN FNGR THK94MIL TRNSLUC YEL LTX

## (undated) DEVICE — 4-PORT MANIFOLD: Brand: NEPTUNE 2

## (undated) DEVICE — ANTIBACTERIAL UNDYED BRAIDED (POLYGLACTIN 910), SYNTHETIC ABSORBABLE SUTURE: Brand: COATED VICRYL

## (undated) DEVICE — BANDAGE GZ W2XL75IN ST RAYON POLY CNFRM STRTCH LTWT

## (undated) DEVICE — SCRW CANN VA LK 5X75MM
Type: IMPLANTABLE DEVICE | Status: NON-FUNCTIONAL
Removed: 2019-03-26

## (undated) DEVICE — SPNG GZ WOVN 4X4IN 12PLY 10/BX STRL

## (undated) DEVICE — PREP SOL POVIDONE/IODINE BT 4OZ

## (undated) DEVICE — BIPOLAR SEALER 23-112-1 AQM 6.0: Brand: AQUAMANTYS™

## (undated) DEVICE — DRP C/ARMOR

## (undated) DEVICE — CHLORAPREP 26ML ORANGE

## (undated) DEVICE — COTTON UNDERCAST PADDING,REGULAR FINISH: Brand: WEBRIL

## (undated) DEVICE — Device

## (undated) DEVICE — SUT VIC 4/0 P3 18IN UD VCP494H

## (undated) DEVICE — BNDG ELAS W/CLIP 6IN 10YD LF STRL

## (undated) DEVICE — SCRW CORT S/TAP 4.5X32MM
Type: IMPLANTABLE DEVICE | Status: NON-FUNCTIONAL
Removed: 2019-03-26

## (undated) DEVICE — IMPLANTABLE DEVICE
Type: IMPLANTABLE DEVICE | Status: NON-FUNCTIONAL
Removed: 2019-03-26

## (undated) DEVICE — SUT MNCRYL 4/0 P3 18IN UD MCP494G

## (undated) DEVICE — TRY PREP SCRB VAG PVP

## (undated) DEVICE — CLTH CLENS READYCLEANSE PERI CARE PK/5

## (undated) DEVICE — BIT DRL QC DIA 4.3X180MM

## (undated) DEVICE — CVR UNIV C/ARM

## (undated) DEVICE — PK HIP TOTL 30

## (undated) DEVICE — UNDERCAST PADDING: Brand: DEROYAL

## (undated) DEVICE — ELECTROSURGICAL PENCIL BUTTON SWITCH NON COATED BLADE ELECTRODE 10 FT (3 M) CORD HOLSTER: Brand: MEGADYNE

## (undated) DEVICE — PROXIMATE RH ROTATING HEAD SKIN STAPLERS (35 WIDE) CONTAINS 35 STAINLESS STEEL STAPLES: Brand: PROXIMATE

## (undated) DEVICE — CVR BRD ARM 13X30

## (undated) DEVICE — GLV SURG TRIUMPH PF LTX 7.5 STRL

## (undated) DEVICE — SUT ETHLN 5/0 P3 18IN 698H

## (undated) DEVICE — SPONGE GZ W4XL4IN RAYON POLY FILL CVR W/ NONWOVEN FAB

## (undated) DEVICE — DRSNG WND GZ CURAD OIL EMULSION 3X8IN STRL PK/3

## (undated) DEVICE — BIT DRL QC DIA 4.5X145MM

## (undated) DEVICE — SUT GUT CHRM 2/0 CT1 36IN 923H

## (undated) DEVICE — GW THRD DRL/TP 2.5X200MM

## (undated) DEVICE — SPECIMEN COLLECTION 4-PORT MANIFOLD: Brand: NEPTUNE 2

## (undated) DEVICE — PK TURNOVER RM ADV

## (undated) DEVICE — VELCLOSE LATEX FREE VELCRO ELASTIC BANDAGE 4INX5YD: Brand: VELCLOSE

## (undated) DEVICE — SKIN MARKER,REGULAR TIP WITH RULER: Brand: DEVON

## (undated) DEVICE — 3M™ STERI-STRIP™ REINFORCED ADHESIVE SKIN CLOSURES, R1546, 1/4 IN X 4 IN (6 MM X 100 MM), 10 STRIPS/ENVELOPE: Brand: 3M™ STERI-STRIP™

## (undated) DEVICE — CURITY NON-ADHERENT STRIPS: Brand: CURITY

## (undated) DEVICE — 3M™ STERI-DRAPE™ U-DRAPE 1015: Brand: STERI-DRAPE™